# Patient Record
Sex: FEMALE | Race: BLACK OR AFRICAN AMERICAN | Employment: FULL TIME | URBAN - METROPOLITAN AREA
[De-identification: names, ages, dates, MRNs, and addresses within clinical notes are randomized per-mention and may not be internally consistent; named-entity substitution may affect disease eponyms.]

---

## 2019-01-09 ENCOUNTER — APPOINTMENT (OUTPATIENT)
Dept: ULTRASOUND IMAGING | Age: 34
End: 2019-01-09
Attending: EMERGENCY MEDICINE
Payer: SELF-PAY

## 2019-01-09 ENCOUNTER — HOSPITAL ENCOUNTER (EMERGENCY)
Age: 34
Discharge: HOME | End: 2019-01-09
Attending: EMERGENCY MEDICINE
Payer: SELF-PAY

## 2019-01-09 ENCOUNTER — APPOINTMENT (OUTPATIENT)
Dept: CT IMAGING | Age: 34
End: 2019-01-09
Attending: EMERGENCY MEDICINE
Payer: SELF-PAY

## 2019-01-09 VITALS
RESPIRATION RATE: 18 BRPM | BODY MASS INDEX: 47.09 KG/M2 | TEMPERATURE: 98 F | OXYGEN SATURATION: 100 % | DIASTOLIC BLOOD PRESSURE: 92 MMHG | HEIGHT: 66 IN | HEART RATE: 79 BPM | SYSTOLIC BLOOD PRESSURE: 132 MMHG | WEIGHT: 293 LBS

## 2019-01-09 DIAGNOSIS — D21.9 LEIOMYOMA: Primary | ICD-10-CM

## 2019-01-09 DIAGNOSIS — N13.4 HYDROURETER: ICD-10-CM

## 2019-01-09 DIAGNOSIS — R10.31 ABDOMINAL PAIN, RIGHT LOWER QUADRANT: ICD-10-CM

## 2019-01-09 LAB
ALBUMIN SERPL-MCNC: 3.5 G/DL (ref 3.5–5)
ALBUMIN/GLOB SERPL: 0.8 {RATIO} (ref 1.1–2.2)
ALP SERPL-CCNC: 87 U/L (ref 45–117)
ALT SERPL-CCNC: 15 U/L (ref 12–78)
ANION GAP SERPL CALC-SCNC: 9 MMOL/L (ref 5–15)
APPEARANCE UR: ABNORMAL
AST SERPL-CCNC: 21 U/L (ref 15–37)
BACTERIA URNS QL MICRO: NEGATIVE /HPF
BASOPHILS # BLD: 0 K/UL (ref 0–0.1)
BASOPHILS NFR BLD: 0 % (ref 0–1)
BILIRUB SERPL-MCNC: 0.2 MG/DL (ref 0.2–1)
BILIRUB UR QL: NEGATIVE
BUN SERPL-MCNC: 11 MG/DL (ref 6–20)
BUN/CREAT SERPL: 14 (ref 12–20)
CALCIUM SERPL-MCNC: 8.6 MG/DL (ref 8.5–10.1)
CHLORIDE SERPL-SCNC: 101 MMOL/L (ref 97–108)
CLUE CELLS VAG QL WET PREP: NORMAL
CO2 SERPL-SCNC: 25 MMOL/L (ref 21–32)
COLOR UR: ABNORMAL
CREAT SERPL-MCNC: 0.79 MG/DL (ref 0.55–1.02)
DIFFERENTIAL METHOD BLD: ABNORMAL
EOSINOPHIL # BLD: 0 K/UL (ref 0–0.4)
EOSINOPHIL NFR BLD: 0 % (ref 0–7)
EPITH CASTS URNS QL MICRO: ABNORMAL /LPF
ERYTHROCYTE [DISTWIDTH] IN BLOOD BY AUTOMATED COUNT: 18.7 % (ref 11.5–14.5)
GLOBULIN SER CALC-MCNC: 4.6 G/DL (ref 2–4)
GLUCOSE SERPL-MCNC: 87 MG/DL (ref 65–100)
GLUCOSE UR STRIP.AUTO-MCNC: NEGATIVE MG/DL
HCG UR QL: NEGATIVE
HCT VFR BLD AUTO: 29.9 % (ref 35–47)
HGB BLD-MCNC: 9.4 G/DL (ref 11.5–16)
HGB UR QL STRIP: ABNORMAL
IMM GRANULOCYTES # BLD AUTO: 0 K/UL (ref 0–0.04)
IMM GRANULOCYTES NFR BLD AUTO: 0 % (ref 0–0.5)
KETONES UR QL STRIP.AUTO: NEGATIVE MG/DL
KOH PREP SPEC: NORMAL
LEUKOCYTE ESTERASE UR QL STRIP.AUTO: ABNORMAL
LYMPHOCYTES # BLD: 2.5 K/UL (ref 0.8–3.5)
LYMPHOCYTES NFR BLD: 21 % (ref 12–49)
MCH RBC QN AUTO: 20.3 PG (ref 26–34)
MCHC RBC AUTO-ENTMCNC: 31.4 G/DL (ref 30–36.5)
MCV RBC AUTO: 64.4 FL (ref 80–99)
MONOCYTES # BLD: 0.9 K/UL (ref 0–1)
MONOCYTES NFR BLD: 8 % (ref 5–13)
NEUTS SEG # BLD: 8.4 K/UL (ref 1.8–8)
NEUTS SEG NFR BLD: 71 % (ref 32–75)
NITRITE UR QL STRIP.AUTO: NEGATIVE
PH UR STRIP: 6.5 [PH] (ref 5–8)
PLATELET # BLD AUTO: 466 K/UL (ref 150–400)
PMV BLD AUTO: 9.2 FL (ref 8.9–12.9)
POTASSIUM SERPL-SCNC: 3.8 MMOL/L (ref 3.5–5.1)
PROT SERPL-MCNC: 8.1 G/DL (ref 6.4–8.2)
PROT UR STRIP-MCNC: NEGATIVE MG/DL
RBC # BLD AUTO: 4.64 M/UL (ref 3.8–5.2)
RBC #/AREA URNS HPF: ABNORMAL /HPF (ref 0–5)
RBC MORPH BLD: ABNORMAL
SERVICE CMNT-IMP: NORMAL
SODIUM SERPL-SCNC: 135 MMOL/L (ref 136–145)
SP GR UR REFRACTOMETRY: 1.01 (ref 1–1.03)
T VAGINALIS VAG QL WET PREP: NORMAL
UR CULT HOLD, URHOLD: NORMAL
UROBILINOGEN UR QL STRIP.AUTO: 0.2 EU/DL (ref 0.2–1)
WBC # BLD AUTO: 11.8 K/UL (ref 3.6–11)
WBC MORPH BLD: ABNORMAL
WBC URNS QL MICRO: ABNORMAL /HPF (ref 0–4)
XXWBCSUS: 0
YEAST URNS QL MICRO: PRESENT

## 2019-01-09 PROCEDURE — 74011250637 HC RX REV CODE- 250/637: Performed by: EMERGENCY MEDICINE

## 2019-01-09 PROCEDURE — 74011250636 HC RX REV CODE- 250/636: Performed by: EMERGENCY MEDICINE

## 2019-01-09 PROCEDURE — 80053 COMPREHEN METABOLIC PANEL: CPT

## 2019-01-09 PROCEDURE — 81001 URINALYSIS AUTO W/SCOPE: CPT

## 2019-01-09 PROCEDURE — 76830 TRANSVAGINAL US NON-OB: CPT

## 2019-01-09 PROCEDURE — 36415 COLL VENOUS BLD VENIPUNCTURE: CPT

## 2019-01-09 PROCEDURE — 74177 CT ABD & PELVIS W/CONTRAST: CPT

## 2019-01-09 PROCEDURE — 87210 SMEAR WET MOUNT SALINE/INK: CPT

## 2019-01-09 PROCEDURE — 96374 THER/PROPH/DIAG INJ IV PUSH: CPT

## 2019-01-09 PROCEDURE — 76856 US EXAM PELVIC COMPLETE: CPT

## 2019-01-09 PROCEDURE — 81025 URINE PREGNANCY TEST: CPT

## 2019-01-09 PROCEDURE — 99284 EMERGENCY DEPT VISIT MOD MDM: CPT

## 2019-01-09 PROCEDURE — 87491 CHLMYD TRACH DNA AMP PROBE: CPT

## 2019-01-09 PROCEDURE — 85025 COMPLETE CBC W/AUTO DIFF WBC: CPT

## 2019-01-09 PROCEDURE — 74011636320 HC RX REV CODE- 636/320: Performed by: EMERGENCY MEDICINE

## 2019-01-09 RX ORDER — FLUCONAZOLE 100 MG/1
150 TABLET ORAL
Status: COMPLETED | OUTPATIENT
Start: 2019-01-09 | End: 2019-01-09

## 2019-01-09 RX ORDER — KETOROLAC TROMETHAMINE 30 MG/ML
30 INJECTION, SOLUTION INTRAMUSCULAR; INTRAVENOUS
Status: COMPLETED | OUTPATIENT
Start: 2019-01-09 | End: 2019-01-09

## 2019-01-09 RX ADMIN — IOPAMIDOL 100 ML: 755 INJECTION, SOLUTION INTRAVENOUS at 21:46

## 2019-01-09 RX ADMIN — KETOROLAC TROMETHAMINE 30 MG: 30 INJECTION, SOLUTION INTRAMUSCULAR at 19:46

## 2019-01-09 RX ADMIN — FLUCONAZOLE 150 MG: 100 TABLET ORAL at 23:07

## 2019-01-09 NOTE — ED PROVIDER NOTES
HPI  
 
Pt is a 26 y.o. F presenting with c/o right pelvic pain and right abdominal pain x 3 - 4 days. Sx worsened today bringing her to ED. No N/V. No exacerbating factors noted at this time. She has noticed vaginal spotting today as well as vaginal discharge. She states she is not sexually active and has nexplanon implant. No fever or chills. No changes in stool. She has medicated with motrin and aleve today without relief. No h/o ovarian cyst.  No other complaints at this time. Past Medical History:  
Diagnosis Date  Asthma   delivery History reviewed. No pertinent surgical history. History reviewed. No pertinent family history. Social History Socioeconomic History  Marital status: SINGLE Spouse name: Not on file  Number of children: Not on file  Years of education: Not on file  Highest education level: Not on file Social Needs  Financial resource strain: Not on file  Food insecurity - worry: Not on file  Food insecurity - inability: Not on file  Transportation needs - medical: Not on file  Transportation needs - non-medical: Not on file Occupational History  Not on file Tobacco Use  Smoking status: Current Every Day Smoker Packs/day: 0.25 Substance and Sexual Activity  Alcohol use: No  
 Drug use: No  
 Sexual activity: Not on file Other Topics Concern  Not on file Social History Narrative  Not on file ALLERGIES: Patient has no known allergies. Review of Systems Gastrointestinal: Positive for abdominal pain. Genitourinary: Positive for pelvic pain, vaginal bleeding and vaginal discharge. Vitals:  
 19 1825 BP: 189/84 Pulse: (!) 102 Resp: 18 Temp: 98 °F (36.7 °C) SpO2: 100% Weight: 137.3 kg (302 lb 11.1 oz) Height: 5' 6\" (1.676 m) Physical Exam  
Constitutional: She is oriented to person, place, and time. She appears well-developed. No distress. Eyes: Conjunctivae are normal.  
Cardiovascular: Regular rhythm. Tachycardia present. Pulmonary/Chest: Effort normal and breath sounds normal.  
Abdominal: Soft. Normal appearance and bowel sounds are normal. There is tenderness in the right lower quadrant and suprapubic area. There is no rigidity, no rebound, no guarding and no CVA tenderness. Hernia confirmed negative in the right inguinal area. Genitourinary: Rectum normal. Pelvic exam was performed with patient supine. Cervix exhibits no motion tenderness, no discharge and no friability. Right adnexum displays tenderness. There is bleeding in the vagina. No foreign body in the vagina. Lymphadenopathy: No inguinal adenopathy noted on the right or left side. Neurological: She is alert and oriented to person, place, and time. She has normal strength. No sensory deficit. GCS eye subscore is 4. GCS verbal subscore is 5. GCS motor subscore is 6. Skin: Skin is warm. Capillary refill takes less than 2 seconds. No rash noted. She is not diaphoretic. Psychiatric: She has a normal mood and affect. MDM Procedures 7:14 PM 
Change of shift. Care of patient signed over to Dr. Zachery Greene. Bedside handoff complete. The plan at this time is to follow up CMP, wet prep, and US. We have discussed CBC although diff pending. Pt advised if US normal she will need CT to r/o appendicitis. If cyst and she remains stable she may follow up outpatient GYN and if torsion she will need emergent GYN. Pt comfortable with the above plan. She is stable at this time.   
 
Leydi Strauss MD 
1/9/2019 
19:16

## 2019-01-09 NOTE — ED TRIAGE NOTES
Pt ambulatory to treatment area with c/o \"vaginal dark brown bleeding that has been going on for 23 days and I have also been having lower pelvic pain and some pain on the right side of my stomach. \"  Pt states \"i have been taking ibuprofen and aleve but it hasn't helped. \"  Pt denies vomiting and diarrhea, no urinary symptoms. Pt reports \"some nausea today. \"  Dr. Matthew Collins at bedside to evaluate.

## 2019-01-10 NOTE — ED NOTES
The patient was discharged home by Dr. Zoran Espinal  in stable condition. The patient is alert and oriented, in no respiratory distress and discharge vital signs obtained. The patient's diagnosis, condition and treatment were explained. The patient expressed understanding. No prescriptions given. No work/school note given. A discharge plan has been developed. A  was not involved in the process. Aftercare instructions were given. Pt ambulatory out of the ED.

## 2019-01-10 NOTE — ED NOTES
AIDET communication provided and informed of purposeful rounding to include collaboration of entire care team; patient acknowledged understanding. IV access obtained, blood sent to lab. Call bell in reach.

## 2019-01-10 NOTE — ED NOTES
7:06 PM 
Change of shift. Care of patient taken over from Dr. Andrey Bird; H&P reviewed, bedside handoff complete. Awaiting US, KOH, WET prep, metabolic panel. 8:56 PM 
Patient reassessed and states that pain is little improved. US reviewed and is significant for fibroid uterus and right ovarian cyst but patient still significantly tender in RLQ and has elevated WBC. Will obtain CT abd/pelvis to eval for appendicitis. 11:10 PM 
Patient reassessed and pain is improved. CT shows normal appendix but mild hydroureter possible due to mass effect of fibroid uterus. UA only with yeast.  Discussed findings with patient and advised follow-up with her GYN. Return to ED if new or worsened symptoms. Given single dose of diflucan in ED.

## 2019-01-11 LAB
C TRACH DNA SPEC QL NAA+PROBE: NEGATIVE
N GONORRHOEA DNA SPEC QL NAA+PROBE: NEGATIVE
SAMPLE TYPE: NORMAL
SERVICE CMNT-IMP: NORMAL
SPECIMEN SOURCE: NORMAL

## 2020-06-09 ENCOUNTER — APPOINTMENT (OUTPATIENT)
Dept: ULTRASOUND IMAGING | Age: 35
End: 2020-06-09
Attending: EMERGENCY MEDICINE
Payer: MEDICAID

## 2020-06-09 ENCOUNTER — HOSPITAL ENCOUNTER (OUTPATIENT)
Age: 35
Setting detail: OBSERVATION
Discharge: HOME | End: 2020-06-10
Attending: EMERGENCY MEDICINE | Admitting: FAMILY MEDICINE
Payer: MEDICAID

## 2020-06-09 ENCOUNTER — HOSPITAL ENCOUNTER (OUTPATIENT)
Dept: LAB | Age: 35
Discharge: HOME | End: 2020-06-09
Payer: MEDICAID

## 2020-06-09 ENCOUNTER — APPOINTMENT (OUTPATIENT)
Dept: CT IMAGING | Age: 35
End: 2020-06-09
Attending: EMERGENCY MEDICINE
Payer: MEDICAID

## 2020-06-09 DIAGNOSIS — D21.9 LEIOMYOMA: ICD-10-CM

## 2020-06-09 DIAGNOSIS — N12 PYELONEPHRITIS: Primary | ICD-10-CM

## 2020-06-09 DIAGNOSIS — D50.8 IRON DEFICIENCY ANEMIA SECONDARY TO INADEQUATE DIETARY IRON INTAKE: ICD-10-CM

## 2020-06-09 DIAGNOSIS — D64.9 ANEMIA, UNSPECIFIED TYPE: ICD-10-CM

## 2020-06-09 DIAGNOSIS — N93.9 VAGINAL BLEEDING: Primary | ICD-10-CM

## 2020-06-09 DIAGNOSIS — Z86.19 HISTORY OF HPV INFECTION: ICD-10-CM

## 2020-06-09 DIAGNOSIS — N10 PYELONEPHRITIS, ACUTE: ICD-10-CM

## 2020-06-09 DIAGNOSIS — E87.6 HYPOKALEMIA: ICD-10-CM

## 2020-06-09 PROBLEM — D72.829 LEUKOCYTOSIS: Status: ACTIVE | Noted: 2020-06-09

## 2020-06-09 PROBLEM — N85.8 UTERINE MASS: Status: ACTIVE | Noted: 2020-06-09

## 2020-06-09 LAB
ALBUMIN SERPL-MCNC: 3.1 G/DL (ref 3.5–5)
ALBUMIN/GLOB SERPL: 0.6 {RATIO} (ref 1.1–2.2)
ALP SERPL-CCNC: 74 U/L (ref 45–117)
ALT SERPL-CCNC: 21 U/L (ref 12–78)
ANION GAP SERPL CALC-SCNC: 9 MMOL/L (ref 5–15)
APPEARANCE UR: ABNORMAL
AST SERPL-CCNC: 16 U/L (ref 15–37)
BACTERIA URNS QL MICRO: ABNORMAL /HPF
BASOPHILS # BLD: 0 K/UL (ref 0–0.1)
BASOPHILS NFR BLD: 0 % (ref 0–1)
BILIRUB SERPL-MCNC: 0.5 MG/DL (ref 0.2–1)
BILIRUB UR QL: NEGATIVE
BUN SERPL-MCNC: 6 MG/DL (ref 6–20)
BUN/CREAT SERPL: 6 (ref 12–20)
CALCIUM SERPL-MCNC: 8.6 MG/DL (ref 8.5–10.1)
CHLORIDE SERPL-SCNC: 103 MMOL/L (ref 97–108)
CO2 SERPL-SCNC: 22 MMOL/L (ref 21–32)
COLOR UR: ABNORMAL
COMMENT, HOLDF: NORMAL
CREAT SERPL-MCNC: 0.99 MG/DL (ref 0.55–1.02)
DIFFERENTIAL METHOD BLD: ABNORMAL
EOSINOPHIL # BLD: 0 K/UL (ref 0–0.4)
EOSINOPHIL NFR BLD: 0 % (ref 0–7)
EPITH CASTS URNS QL MICRO: ABNORMAL /LPF
ERYTHROCYTE [DISTWIDTH] IN BLOOD BY AUTOMATED COUNT: 21.3 % (ref 11.5–14.5)
FERRITIN SERPL-MCNC: 22 NG/ML (ref 8–252)
FOLATE SERPL-MCNC: 7.3 NG/ML (ref 5–21)
GLOBULIN SER CALC-MCNC: 5 G/DL (ref 2–4)
GLUCOSE SERPL-MCNC: 106 MG/DL (ref 65–100)
GLUCOSE UR STRIP.AUTO-MCNC: NEGATIVE MG/DL
HCG SERPL QL: NEGATIVE
HCT VFR BLD AUTO: 25.3 % (ref 35–47)
HGB BLD-MCNC: 7.2 G/DL (ref 11.5–16)
HGB UR QL STRIP: ABNORMAL
HYALINE CASTS URNS QL MICRO: ABNORMAL /LPF (ref 0–5)
IMM GRANULOCYTES # BLD AUTO: 0.2 K/UL (ref 0–0.04)
IMM GRANULOCYTES NFR BLD AUTO: 1 % (ref 0–0.5)
IRON SATN MFR SERPL: 2 % (ref 20–50)
IRON SERPL-MCNC: 8 UG/DL (ref 35–150)
KETONES UR QL STRIP.AUTO: NEGATIVE MG/DL
LEUKOCYTE ESTERASE UR QL STRIP.AUTO: ABNORMAL
LYMPHOCYTES # BLD: 1.8 K/UL (ref 0.8–3.5)
LYMPHOCYTES NFR BLD: 9 % (ref 12–49)
MCH RBC QN AUTO: 16.1 PG (ref 26–34)
MCHC RBC AUTO-ENTMCNC: 28.5 G/DL (ref 30–36.5)
MCV RBC AUTO: 56.5 FL (ref 80–99)
MONOCYTES # BLD: 2.2 K/UL (ref 0–1)
MONOCYTES NFR BLD: 11 % (ref 5–13)
NEUTS SEG # BLD: 15.8 K/UL (ref 1.8–8)
NEUTS SEG NFR BLD: 79 % (ref 32–75)
NITRITE UR QL STRIP.AUTO: NEGATIVE
NRBC # BLD: 0 K/UL (ref 0–0.01)
NRBC BLD-RTO: 0 PER 100 WBC
PH UR STRIP: 6.5 [PH] (ref 5–8)
PLATELET # BLD AUTO: 475 K/UL (ref 150–400)
PMV BLD AUTO: 9.3 FL (ref 8.9–12.9)
POTASSIUM SERPL-SCNC: 2.9 MMOL/L (ref 3.5–5.1)
PROT SERPL-MCNC: 8.1 G/DL (ref 6.4–8.2)
PROT UR STRIP-MCNC: 30 MG/DL
RBC # BLD AUTO: 4.48 M/UL (ref 3.8–5.2)
RBC #/AREA URNS HPF: ABNORMAL /HPF (ref 0–5)
RBC MORPH BLD: ABNORMAL
SAMPLES BEING HELD,HOLD: NORMAL
SODIUM SERPL-SCNC: 134 MMOL/L (ref 136–145)
SP GR UR REFRACTOMETRY: 1.01 (ref 1–1.03)
TIBC SERPL-MCNC: 324 UG/DL (ref 250–450)
TSH SERPL DL<=0.05 MIU/L-ACNC: 0.33 UIU/ML (ref 0.36–3.74)
UROBILINOGEN UR QL STRIP.AUTO: 1 EU/DL (ref 0.2–1)
VIT B12 SERPL-MCNC: 282 PG/ML (ref 193–986)
WBC # BLD AUTO: 20 K/UL (ref 3.6–11)
WBC URNS QL MICRO: ABNORMAL /HPF (ref 0–4)

## 2020-06-09 PROCEDURE — 76856 US EXAM PELVIC COMPLETE: CPT

## 2020-06-09 PROCEDURE — 74177 CT ABD & PELVIS W/CONTRAST: CPT

## 2020-06-09 PROCEDURE — 84443 ASSAY THYROID STIM HORMONE: CPT

## 2020-06-09 PROCEDURE — 96374 THER/PROPH/DIAG INJ IV PUSH: CPT

## 2020-06-09 PROCEDURE — 84703 CHORIONIC GONADOTROPIN ASSAY: CPT

## 2020-06-09 PROCEDURE — 74011250637 HC RX REV CODE- 250/637: Performed by: FAMILY MEDICINE

## 2020-06-09 PROCEDURE — 80053 COMPREHEN METABOLIC PANEL: CPT

## 2020-06-09 PROCEDURE — 82746 ASSAY OF FOLIC ACID SERUM: CPT

## 2020-06-09 PROCEDURE — 82607 VITAMIN B-12: CPT

## 2020-06-09 PROCEDURE — 96375 TX/PRO/DX INJ NEW DRUG ADDON: CPT

## 2020-06-09 PROCEDURE — 74011000250 HC RX REV CODE- 250: Performed by: EMERGENCY MEDICINE

## 2020-06-09 PROCEDURE — 36415 COLL VENOUS BLD VENIPUNCTURE: CPT

## 2020-06-09 PROCEDURE — 74011000258 HC RX REV CODE- 258: Performed by: RADIOLOGY

## 2020-06-09 PROCEDURE — 74011250636 HC RX REV CODE- 250/636: Performed by: EMERGENCY MEDICINE

## 2020-06-09 PROCEDURE — 74011636320 HC RX REV CODE- 636/320: Performed by: RADIOLOGY

## 2020-06-09 PROCEDURE — 76830 TRANSVAGINAL US NON-OB: CPT

## 2020-06-09 PROCEDURE — 82728 ASSAY OF FERRITIN: CPT

## 2020-06-09 PROCEDURE — 88175 CYTOPATH C/V AUTO FLUID REDO: CPT

## 2020-06-09 PROCEDURE — 74011000258 HC RX REV CODE- 258: Performed by: EMERGENCY MEDICINE

## 2020-06-09 PROCEDURE — 87086 URINE CULTURE/COLONY COUNT: CPT

## 2020-06-09 PROCEDURE — 99218 HC RM OBSERVATION: CPT

## 2020-06-09 PROCEDURE — 83540 ASSAY OF IRON: CPT

## 2020-06-09 PROCEDURE — 81001 URINALYSIS AUTO W/SCOPE: CPT

## 2020-06-09 PROCEDURE — 74011250637 HC RX REV CODE- 250/637: Performed by: EMERGENCY MEDICINE

## 2020-06-09 PROCEDURE — 99284 EMERGENCY DEPT VISIT MOD MDM: CPT

## 2020-06-09 PROCEDURE — 85025 COMPLETE CBC W/AUTO DIFF WBC: CPT

## 2020-06-09 RX ORDER — POTASSIUM CHLORIDE 750 MG/1
30 TABLET, FILM COATED, EXTENDED RELEASE ORAL EVERY 4 HOURS
Status: DISCONTINUED | OUTPATIENT
Start: 2020-06-09 | End: 2020-06-09

## 2020-06-09 RX ORDER — POTASSIUM CHLORIDE 750 MG/1
40 TABLET, FILM COATED, EXTENDED RELEASE ORAL
Status: COMPLETED | OUTPATIENT
Start: 2020-06-09 | End: 2020-06-09

## 2020-06-09 RX ORDER — LANOLIN ALCOHOL/MO/W.PET/CERES
1 CREAM (GRAM) TOPICAL DAILY
Status: DISCONTINUED | OUTPATIENT
Start: 2020-06-10 | End: 2020-06-10 | Stop reason: HOSPADM

## 2020-06-09 RX ORDER — OXYCODONE AND ACETAMINOPHEN 5; 325 MG/1; MG/1
1 TABLET ORAL
Status: DISCONTINUED | OUTPATIENT
Start: 2020-06-09 | End: 2020-06-10 | Stop reason: HOSPADM

## 2020-06-09 RX ORDER — DIPHENHYDRAMINE HYDROCHLORIDE 50 MG/ML
12.5 INJECTION, SOLUTION INTRAMUSCULAR; INTRAVENOUS
Status: COMPLETED | OUTPATIENT
Start: 2020-06-09 | End: 2020-06-09

## 2020-06-09 RX ORDER — POTASSIUM CHLORIDE 750 MG/1
30 TABLET, FILM COATED, EXTENDED RELEASE ORAL EVERY 4 HOURS
Status: COMPLETED | OUTPATIENT
Start: 2020-06-09 | End: 2020-06-09

## 2020-06-09 RX ORDER — SODIUM CHLORIDE 0.9 % (FLUSH) 0.9 %
10 SYRINGE (ML) INJECTION
Status: COMPLETED | OUTPATIENT
Start: 2020-06-09 | End: 2020-06-09

## 2020-06-09 RX ORDER — SODIUM CHLORIDE 9 MG/ML
150 INJECTION, SOLUTION INTRAVENOUS ONCE
Status: COMPLETED | OUTPATIENT
Start: 2020-06-09 | End: 2020-06-09

## 2020-06-09 RX ORDER — MORPHINE SULFATE 2 MG/ML
1 INJECTION, SOLUTION INTRAMUSCULAR; INTRAVENOUS
Status: DISCONTINUED | OUTPATIENT
Start: 2020-06-09 | End: 2020-06-10 | Stop reason: HOSPADM

## 2020-06-09 RX ORDER — SODIUM CHLORIDE 0.9 % (FLUSH) 0.9 %
5-40 SYRINGE (ML) INJECTION EVERY 8 HOURS
Status: DISCONTINUED | OUTPATIENT
Start: 2020-06-09 | End: 2020-06-10 | Stop reason: HOSPADM

## 2020-06-09 RX ORDER — ACETAMINOPHEN 325 MG/1
650 TABLET ORAL
Status: DISCONTINUED | OUTPATIENT
Start: 2020-06-09 | End: 2020-06-10 | Stop reason: HOSPADM

## 2020-06-09 RX ORDER — LEVOFLOXACIN 750 MG/1
750 TABLET ORAL EVERY 24 HOURS
Status: DISCONTINUED | OUTPATIENT
Start: 2020-06-09 | End: 2020-06-10 | Stop reason: HOSPADM

## 2020-06-09 RX ORDER — ONDANSETRON 2 MG/ML
4 INJECTION INTRAMUSCULAR; INTRAVENOUS
Status: DISCONTINUED | OUTPATIENT
Start: 2020-06-09 | End: 2020-06-10 | Stop reason: HOSPADM

## 2020-06-09 RX ORDER — KETOROLAC TROMETHAMINE 30 MG/ML
15 INJECTION, SOLUTION INTRAMUSCULAR; INTRAVENOUS
Status: COMPLETED | OUTPATIENT
Start: 2020-06-09 | End: 2020-06-09

## 2020-06-09 RX ORDER — SODIUM CHLORIDE 0.9 % (FLUSH) 0.9 %
5-40 SYRINGE (ML) INJECTION AS NEEDED
Status: DISCONTINUED | OUTPATIENT
Start: 2020-06-09 | End: 2020-06-10 | Stop reason: HOSPADM

## 2020-06-09 RX ADMIN — SODIUM CHLORIDE 10 MG: 9 INJECTION INTRAMUSCULAR; INTRAVENOUS; SUBCUTANEOUS at 08:40

## 2020-06-09 RX ADMIN — Medication 10 ML: at 09:35

## 2020-06-09 RX ADMIN — SODIUM CHLORIDE 1000 ML: 900 INJECTION, SOLUTION INTRAVENOUS at 08:30

## 2020-06-09 RX ADMIN — POTASSIUM CHLORIDE 30 MEQ: 750 TABLET, FILM COATED, EXTENDED RELEASE ORAL at 17:10

## 2020-06-09 RX ADMIN — KETOROLAC TROMETHAMINE 15 MG: 30 INJECTION, SOLUTION INTRAMUSCULAR at 08:40

## 2020-06-09 RX ADMIN — LEVOFLOXACIN 750 MG: 750 TABLET, FILM COATED ORAL at 12:36

## 2020-06-09 RX ADMIN — METHYLPREDNISOLONE SODIUM SUCCINATE 125 MG: 125 INJECTION, POWDER, FOR SOLUTION INTRAMUSCULAR; INTRAVENOUS at 08:37

## 2020-06-09 RX ADMIN — IOPAMIDOL 100 ML: 755 INJECTION, SOLUTION INTRAVENOUS at 09:35

## 2020-06-09 RX ADMIN — DIPHENHYDRAMINE HYDROCHLORIDE 12.5 MG: 50 INJECTION, SOLUTION INTRAMUSCULAR; INTRAVENOUS at 08:38

## 2020-06-09 RX ADMIN — POTASSIUM CHLORIDE 30 MEQ: 750 TABLET, FILM COATED, EXTENDED RELEASE ORAL at 12:36

## 2020-06-09 RX ADMIN — CEFTRIAXONE SODIUM 1 G: 1 INJECTION, POWDER, FOR SOLUTION INTRAMUSCULAR; INTRAVENOUS at 09:09

## 2020-06-09 RX ADMIN — SODIUM CHLORIDE 150 ML/HR: 900 INJECTION, SOLUTION INTRAVENOUS at 10:07

## 2020-06-09 RX ADMIN — POTASSIUM CHLORIDE 40 MEQ: 750 TABLET, FILM COATED, EXTENDED RELEASE ORAL at 10:07

## 2020-06-09 RX ADMIN — SODIUM CHLORIDE 50 ML: 900 INJECTION, SOLUTION INTRAVENOUS at 09:35

## 2020-06-09 NOTE — H&P
[address] [facility]  Hospitalist Group  History and Physical    Primary Care Provider: None  Date of Service:  2020    Subjective:     Dylan Elizondo is a 38 y.o. female who presents with a number of medical issues: 1) anemia. 2) dysuria 3) abnormal vaginal bleeding 4) abdominal pain. [facility] and had a hemoglobin of 6.9 hematocrit of 23. She was referred to the emergency room but waited until this morning when she had childcare to come in. She had labs drawn in the ER which showed anemia and leukocytosis. CT of abd pelvis showing right sided hydronephrosis  With evidence of pyelonephritis from compression of right ureter from large uterus/uterine mass. Denies fever, cough, cold symptoms, neck pain, visual changes, focal weakness or rash. Denies  difficulty breathing, difficulty swallowing, SOB or chest pain. Denies any nausea, vomiting or diarrhea. Pt. Reports that she has not had any food or medications today prior to arrival. Patient has Nexplanon implant.        Review of Systems:    A comprehensive review of systems was negative except for that written in the History of Present Illness.      Past Medical History:   Diagnosis Date    Asthma      delivery    uterine fibroids, anemia requiring blood transfusion in the past     Surgical history- C Section x2    Meds- patient reports that she was not taking any prescription meds, and no OTC med prior to this admission    Allergies- mild itching with tylenol/codeine    SOCIAL HISTORY:  Patient resides at home with her  and 2 children, she works in a dental office  Patient ambulates independently   Smoking history: current cigarette smoker   Alcohol history: denies        Objective:     Patient Vitals for the past 24 hrs:   Temp Pulse Resp BP SpO2   20 0758 98.1 °F (36.7 °C) (!) 107 16 132/75 100 %       Physical Exam:   Visit Vitals  /75   Pulse (!) 107   Temp 98.1 °F (36.7 °C) Resp 16   SpO2 100%     General:  Alert, cooperative, no distress, appears stated age, overweight. Head:  Normocephalic, without obvious abnormality, atraumatic. Eyes:  Conjunctivae/corneas clear. EOMs intact. Throat: Lips, mucosa, and tongue normal. Teeth and gums normal.   Neck: No visible swelling or masses   Back:   Symmetric, no curvature. ROM normal.   Lungs:   Clear to auscultation bilaterally. Chest wall:  No tenderness or deformity. Heart:  Tachycardia, no murmurs   Abdomen:   Soft, mild distension, mild diffuse tenderness. Genitalia:  Deferred to GYN team.   Extremities: Extremities normal, atraumatic, no cyanosis , trace edema. Pulses: 2+ and symmetric all extremities. Skin: Skin color, texture, turgor normal. No rashes or lesions. Lymph nodes: Cervical, supraclavicular nodes normal.   Neurologic: CNII-XII intact. Normal strength, sensation        Data Review: All diagnostic labs and studies have been reviewed. Recent Results (from the past 24 hour(s))   CBC WITH AUTOMATED DIFF    Collection Time: 06/09/20  8:05 AM   Result Value Ref Range    WBC 20.0 (H) 3.6 - 11.0 K/uL    RBC 4.48 3.80 - 5.20 M/uL    HGB 7.2 (L) 11.5 - 16.0 g/dL    HCT 25.3 (L) 35.0 - 47.0 %    MCV 56.5 (L) 80.0 - 99.0 FL    MCH 16.1 (L) 26.0 - 34.0 PG    MCHC 28.5 (L) 30.0 - 36.5 g/dL    RDW 21.3 (H) 11.5 - 14.5 %    PLATELET 018 (H) 214 - 400 K/uL    MPV 9.3 8.9 - 12.9 FL    NRBC 0.0 0  WBC    ABSOLUTE NRBC 0.00 0.00 - 0.01 K/uL    NEUTROPHILS 79 (H) 32 - 75 %    LYMPHOCYTES 9 (L) 12 - 49 %    MONOCYTES 11 5 - 13 %    EOSINOPHILS 0 0 - 7 %    BASOPHILS 0 0 - 1 %    IMMATURE GRANULOCYTES 1 (H) 0.0 - 0.5 %    ABS. NEUTROPHILS 15.8 (H) 1.8 - 8.0 K/UL    ABS. LYMPHOCYTES 1.8 0.8 - 3.5 K/UL    ABS. MONOCYTES 2.2 (H) 0.0 - 1.0 K/UL    ABS. EOSINOPHILS 0.0 0.0 - 0.4 K/UL    ABS. BASOPHILS 0.0 0.0 - 0.1 K/UL    ABS. IMM.  GRANS. 0.2 (H) 0.00 - 0.04 K/UL    DF SMEAR SCANNED      RBC COMMENTS ANISOCYTOSIS  2+ RBC COMMENTS MICROCYTOSIS  3+        RBC COMMENTS HYPOCHROMIA  3+        RBC COMMENTS OVALOCYTES  PRESENT       METABOLIC PANEL, COMPREHENSIVE    Collection Time: 06/09/20  8:05 AM   Result Value Ref Range    Sodium 134 (L) 136 - 145 mmol/L    Potassium 2.9 (L) 3.5 - 5.1 mmol/L    Chloride 103 97 - 108 mmol/L    CO2 22 21 - 32 mmol/L    Anion gap 9 5 - 15 mmol/L    Glucose 106 (H) 65 - 100 mg/dL    BUN 6 6 - 20 MG/DL    Creatinine 0.99 0.55 - 1.02 MG/DL    BUN/Creatinine ratio 6 (L) 12 - 20      GFR est AA >60 >60 ml/min/1.73m2    GFR est non-AA >60 >60 ml/min/1.73m2    Calcium 8.6 8.5 - 10.1 MG/DL    Bilirubin, total 0.5 0.2 - 1.0 MG/DL    ALT (SGPT) 21 12 - 78 U/L    AST (SGOT) 16 15 - 37 U/L    Alk. phosphatase 74 45 - 117 U/L    Protein, total 8.1 6.4 - 8.2 g/dL    Albumin 3.1 (L) 3.5 - 5.0 g/dL    Globulin 5.0 (H) 2.0 - 4.0 g/dL    A-G Ratio 0.6 (L) 1.1 - 2.2     SAMPLES BEING HELD    Collection Time: 06/09/20  8:05 AM   Result Value Ref Range    SAMPLES BEING HELD 1BLU,1RED     COMMENT        Add-on orders for these samples will be processed based on acceptable specimen integrity and analyte stability, which may vary by analyte.    FERRITIN    Collection Time: 06/09/20  8:05 AM   Result Value Ref Range    Ferritin 22 8 - 252 NG/ML   HCG QL SERUM    Collection Time: 06/09/20  8:05 AM   Result Value Ref Range    HCG, Ql. Negative NEG     URINALYSIS W/ RFLX MICROSCOPIC    Collection Time: 06/09/20  8:13 AM   Result Value Ref Range    Color YELLOW/STRAW      Appearance CLOUDY (A) CLEAR      Specific gravity 1.010 1.003 - 1.030      pH (UA) 6.5 5.0 - 8.0      Protein 30 (A) NEG mg/dL    Glucose Negative NEG mg/dL    Ketone Negative NEG mg/dL    Bilirubin Negative NEG      Blood TRACE (A) NEG      Urobilinogen 1.0 0.2 - 1.0 EU/dL    Nitrites Negative NEG      Leukocyte Esterase LARGE (A) NEG      WBC  0 - 4 /hpf    RBC 0-5 0 - 5 /hpf    Epithelial cells MANY (A) FEW /lpf    Bacteria 3+ (A) NEG /hpf Hyaline cast 2-5 0 - 5 /lpf     CT Results  (Last 48 hours)               06/09/20 0947  CT ABD PELV W CONT Final result    Impression:  IMPRESSION:       1. Right kidney is smaller than the left and there are areas of scarring within   the right kidney. There there are also low-density areas in the right renal   cortex which may represent right pyelonephritis. . There is right   hydroureteronephrosis similar to the prior study with the right ureter being   compressed by enlarged leiomyomatous uterus. 2. The uterus measures 19.3 x 12.4 x 9.3 cm. There are mass lesions within the   uterus with the largest in the right uterine fundus measuring 10.7 x 8.6 cm   which is compressing the right ureter. Narrative:  EXAM: CT ABD PELV W CONT       INDICATION: concern for diverticulitis       COMPARISON: 1/9/2019        CONTRAST: 100 mL of Isovue-370. TECHNIQUE:    Following the uneventful intravenous administration of contrast, thin axial   images were obtained through the abdomen and pelvis. Coronal and sagittal   reconstructions were generated. Oral contrast was not administered. CT dose   reduction was achieved through use of a standardized protocol tailored for this   examination and automatic exposure control for dose modulation. FINDINGS:    LOWER THORAX: No significant abnormality in the incidentally imaged lower chest.   LIVER: No mass. BILIARY TREE: Gallbladder is within normal limits. CBD is not dilated. SPLEEN: within normal limits. PANCREAS: No mass or ductal dilatation. ADRENALS: Unremarkable. KIDNEYS: Right kidney is smaller than the right. There are areas of scarring. There are low-density areas in the cortex of the right kidney. There is right   hydroureteronephrosis which may be followed to the level of the enlarged uterus   similar to 1/29/2019. STOMACH: Unremarkable. SMALL BOWEL: No dilatation or wall thickening. COLON: No dilatation or wall thickening.    APPENDIX: Unremarkable. PERITONEUM: No ascites or pneumoperitoneum. RETROPERITONEUM: No lymphadenopathy or aortic aneurysm. REPRODUCTIVE ORGANS: The uterus is enlarged measuring 19.3 x 12.4 x 9.3 cm. There is a low-density mass again noted in the right uterine fundus measuring   10.7 x 8.6 cm which is compressing the right ureter. Small cysts are noted   within the ovaries similar to the prior study. URINARY BLADDER: No mass or calculus. BONES: No destructive bone lesion. ABDOMINAL WALL: There is a small umbilical hernia containing fat. ADDITIONAL COMMENTS: N/A                 Assessment:     Active Problems:    Leukocytosis (6/9/2020)      Pyelonephritis, acute (6/9/2020)      Anemia (6/9/2020)      Uterine mass (6/9/2020)        Plan:     1. UTI with Acute Right sided pyelonephritis/R hydroureter- due to uterine mass compressing R ureter  Starting levaquin, IVF bolus given in ER,   Patient treated for same issue in 1/2019  Follow urine culture  No evidence of complete obstruction and creatinine normal    2. Anemia- multifactorial but mostly due to abnormal uterine bleeding  CT showing huge uterine mass/fibroid compression R ureter  Pelvis US done and results noted  Check iron, b12, folate; monitor Hb, transfuse for Hb < 6    3) large uterine abdominopelvic mass- 20 x 13cm- causing R ureteral compression and pyelonephritis  Consult GYN oncology for eval and tx recs    4. Hypokalemia- replete PO  5. Morbid obesity  6. Tobacco use- nicotine patch  7. Tachycardia- likely due to #1 & 2, monitor  8.  Full Code  = motherMarguarite Height [phone]    Signed By: Tiago Escobar MD     June 9, 2020

## 2020-06-09 NOTE — ED PROVIDER NOTES
HPI patient is a 68-year-old morbidly obese female who presents the ED with reports of headache, body aches, leg heaviness; episodic rectal bleeding with bowel movements and irregular vaginal bleeding for several weeks. [facility] and had a hemoglobin of 6.9 hematocrit of 23. She was referred to the emergency room but waited until this morning when she had childcare to come in. She denies any vaginal bleeding or rectal bleeding at this time last episode was . Denies fever, cough, cold symptoms, neck pain, visual changes, focal weakness or rash. Denies  difficulty breathing, difficulty swallowing, SOB or chest pain. Denies any nausea, vomiting or diarrhea. Pt. Reports that she has not had any food or medications today prior to arrival.  Patient has Nexplanon implant. Past Medical History:   Diagnosis Date    Asthma      delivery        History reviewed. No pertinent surgical history. History reviewed. No pertinent family history.     Social History     Socioeconomic History    Marital status: SINGLE     Spouse name: Not on file    Number of children: Not on file    Years of education: Not on file    Highest education level: Not on file   Occupational History    Not on file   Social Needs    Financial resource strain: Not on file    Food insecurity     Worry: Not on file     Inability: Not on file    Transportation needs     Medical: Not on file     Non-medical: Not on file   Tobacco Use    Smoking status: Current Every Day Smoker     Packs/day: 0.25   Substance and Sexual Activity    Alcohol use: No    Drug use: No    Sexual activity: Not on file   Lifestyle    Physical activity     Days per week: Not on file     Minutes per session: Not on file    Stress: Not on file   Relationships    Social connections     Talks on phone: Not on file     Gets together: Not on file     Attends Latter day service: Not on file     Active member of club or organization: Not on file     Attends meetings of clubs or organizations: Not on file     Relationship status: Not on file    Intimate partner violence     Fear of current or ex partner: Not on file     Emotionally abused: Not on file     Physically abused: Not on file     Forced sexual activity: Not on file   Other Topics Concern    Not on file   Social History Narrative    Not on file         ALLERGIES: Patient has no known allergies. Review of Systems   Constitutional: Negative for activity change, appetite change, fever and unexpected weight change. HENT: Negative for congestion, sore throat and trouble swallowing. Respiratory: Negative for cough and shortness of breath. Gastrointestinal: Positive for blood in stool. Negative for abdominal pain, diarrhea, nausea and vomiting. Genitourinary: Positive for dysuria, flank pain and vaginal bleeding. Musculoskeletal: Negative for back pain, myalgias and neck pain. Skin: Negative for rash. Neurological: Negative for dizziness, light-headedness and headaches. All other systems reviewed and are negative. Vitals:    20 0758   BP: 132/75   Pulse: (!) 107   Resp: 16   Temp: 98.1 °F (36.7 °C)   SpO2: 100%            Physical Exam  Vitals signs and nursing note reviewed. Constitutional:       General: She is not in acute distress. Appearance: She is obese. She is not ill-appearing, toxic-appearing or diaphoretic. Comments: Morbidly obese black female, smoker; A0   HENT:      Head: Normocephalic. Right Ear: Tympanic membrane normal.      Left Ear: Tympanic membrane normal.      Nose: Nose normal.      Mouth/Throat:      Mouth: Mucous membranes are moist.      Pharynx: No posterior oropharyngeal erythema. Neck:      Musculoskeletal: Normal range of motion and neck supple. Cardiovascular:      Rate and Rhythm: Regular rhythm. Tachycardia present.    Pulmonary:      Effort: Pulmonary effort is normal.      Breath sounds: Normal breath sounds. Abdominal:      General: Bowel sounds are normal.      Tenderness: There is no abdominal tenderness. There is no guarding or rebound. Genitourinary:     Vagina: No vaginal discharge. Comments: No active vaginal bleeding  Skin:     General: Skin is warm and dry. Findings: No rash. Neurological:      General: No focal deficit present. Mental Status: She is alert and oriented to person, place, and time. Psychiatric:         Mood and Affect: Mood normal.         Behavior: Behavior normal.          MDM       Procedures      Patient has been reexamined and is feeling slightly better with IV fluids. Discussed results of ultrasound and CT with the patient. 10:43 AM  Patient's results and plan of care have been reviewed with her. Patient has verbally conveyed her understanding and agreement of her signs, symptoms, diagnosis, treatment and prognosis and additionally agrees to be admitted. CARLOS A Stephens Dr. examined the patient and discussed plan of care. Darielaist Una Serve for Admission  10:45 AM    ED Room Number: R37/R37  Patient Name and age:  Lance Pfeiffer 38 y.o.  female  Working Diagnosis:   1. Pyelonephritis    2. Anemia, unspecified type    3. Leiomyoma    4.  Hypokalemia        COVID-19 Suspicion:  no    Code Status:  Full Code  Readmission: no  Isolation Requirements:  no  Recommended Level of Care:  med/surg  Department:[facility] Adult ED - 21   Other:

## 2020-06-09 NOTE — CONSULTS
[address] [facility], [facility] Mathias Moritz [id number]  P [phone]  F [phone]       Mauro Reese       [id number]  1985    Admitted 2020 Date 2020     Admit dx: Uterine mass [N85.8]  Pyelonephritis, acute [N10]  Leukocytosis [D72.829]  Anemia [D64.9]      HPI:    Mauro Reese is a 38 y.o.  female being seen for fibroid uterus, right hydronephrosis and hx of vaginal bleeding, anemia. She presents to [facility] [place] from home d/t right sided abd/flank pain since last Tuesday, improved but worsened . Using APAP for relief. No hematuria/dysuria. +urgency. CT here demonstrates right hydro, pyelo and heterogenous, fibroid uterus. Admits F/C, no N/V, SOB. Also states +hematochezia Saturday. Bright red. Admit hx of hemorrhoids. This occurs ~3x/yr. Denies vaginal bleeding/discharge since end of menstrual cycle ~9 days ago. Periods last ~11 days, very heavy soaking through tampons/pads at times q2hr for 3 days. A0. Csection x 2 , . She states she was told of fibroids with her second Csection with Dr. Nataly Hay. She was seen at [facility] in 2019 forn right pelvic/abd pain. CT at that time showed similar picture with fibroid uterus and right hydro. She states the following month she was admitted to [facility] for kidney infection. She was told to f/u with her OBGYN, but did not see anyone until 2019 at [facility]. There she states US showed fibroids and was told \"it was not that serious. \"  She did not f/u. She has had an Implanon in situ for at least 5 yrs she thinks. She was last seen by GYN at [facility], states PAP not performed. Admits to HPV+ pap 5 yrs ago w/o followup. Lives with , 2 children. She admits she has completed child bearing. She is a dental assistant, only recently returned to work. She is uncertain if she has any insurance. +tob use 2-3cig/day. Hx of asthma w/o exacerbation.  She is sickle cell trait+, no hx VTE. Patient Active Problem List   Diagnosis Code    Sickle cell trait (HCC) D57.3    Obesity, morbid ([facility] [address] E66.01    Leiomyoma D21.9    Leukocytosis D72.829    Pyelonephritis, acute N10    Anemia D64.9    Uterine mass N85.8       Past Medical History:   Diagnosis Date    Asthma      delivery 2003    Sickle cell trait [facility])       History reviewed. No pertinent surgical history. Social History     Tobacco Use    Smoking status: Current Every Day Smoker     Packs/day: 0.25   Substance Use Topics    Alcohol use: No      History reviewed. No pertinent family history. Current Facility-Administered Medications   Medication Dose Route Frequency    sodium chloride (NS) flush 5-40 mL  5-40 mL IntraVENous Q8H    sodium chloride (NS) flush 5-40 mL  5-40 mL IntraVENous PRN    acetaminophen (TYLENOL) tablet 650 mg  650 mg Oral Q4H PRN    oxyCODONE-acetaminophen (PERCOCET) 5-325 mg per tablet 1 Tab  1 Tab Oral Q4H PRN    morphine injection 1 mg  1 mg IntraVENous Q3H PRN    ondansetron (ZOFRAN) injection 4 mg  4 mg IntraVENous Q4H PRN    levoFLOXacin (LEVAQUIN) tablet 750 mg  750 mg Oral Q24H    potassium chloride SR (KLOR-CON 10) tablet 30 mEq  30 mEq Oral Q4H     No Known Allergies     Review of Systems  A comprehensive review of systems was negative except for that written in the History of Present Illness. , 10 point ROS    OBJECTIVE:    Physical Exam  Visit Vitals  /76 (BP 1 Location: Left arm, BP Patient Position: At rest)   Pulse 74   Temp 98.6 °F (37 °C)   Resp 16   SpO2 100%     General appearance: alert, cooperative, no distress, appears stated age  Eyes: conjunctivae/corneas clear. PER, EOM's intact. Back: symmetric, no curvature. ROM normal. +mild tender right CVA. Lungs: clear to auscultation bilaterally  Heart: regular rate and rhythm, S1, S2 normal, no murmur, click, rub or gallop  Abdomen: soft, obese, ND.  Tender mild RUQ w/o rebound/guarding  Pelvic:  Vulva: Normal, no lesions   Vagina:  Smooth and no lesions, cream colored discharge no malodor. Cervix w/o lesion, no blood at os. Cytology taken. Cervix mobile, nontender. No induration/nodularity. Uterus dextrodeviated, mobile, ~24wks, no US nodularity. Adnexa nonpalp. LAURA no laurel blood. No active hemorrhoids. Extremities: extremities normal, atraumatic, no cyanosis or edema  Pulses: 2+ and symmetric  Skin: Skin color, texture, turgor normal. No rashes or lesions  Lymph nodes: Cervical, supraclavicular, and inguinal nodes normal.  Neurologic: Grossly normal    Data Review      Recent Results (from the past 24 hour(s))   CBC WITH AUTOMATED DIFF    Collection Time: 06/09/20  8:05 AM   Result Value Ref Range    WBC 20.0 (H) 3.6 - 11.0 K/uL    RBC 4.48 3.80 - 5.20 M/uL    HGB 7.2 (L) 11.5 - 16.0 g/dL    HCT 25.3 (L) 35.0 - 47.0 %    MCV 56.5 (L) 80.0 - 99.0 FL    MCH 16.1 (L) 26.0 - 34.0 PG    MCHC 28.5 (L) 30.0 - 36.5 g/dL    RDW 21.3 (H) 11.5 - 14.5 %    PLATELET 869 (H) 253 - 400 K/uL    MPV 9.3 8.9 - 12.9 FL    NRBC 0.0 0  WBC    ABSOLUTE NRBC 0.00 0.00 - 0.01 K/uL    NEUTROPHILS 79 (H) 32 - 75 %    LYMPHOCYTES 9 (L) 12 - 49 %    MONOCYTES 11 5 - 13 %    EOSINOPHILS 0 0 - 7 %    BASOPHILS 0 0 - 1 %    IMMATURE GRANULOCYTES 1 (H) 0.0 - 0.5 %    ABS. NEUTROPHILS 15.8 (H) 1.8 - 8.0 K/UL    ABS. LYMPHOCYTES 1.8 0.8 - 3.5 K/UL    ABS. MONOCYTES 2.2 (H) 0.0 - 1.0 K/UL    ABS. EOSINOPHILS 0.0 0.0 - 0.4 K/UL    ABS. BASOPHILS 0.0 0.0 - 0.1 K/UL    ABS. IMM.  GRANS. 0.2 (H) 0.00 - 0.04 K/UL    DF SMEAR SCANNED      RBC COMMENTS ANISOCYTOSIS  2+        RBC COMMENTS MICROCYTOSIS  3+        RBC COMMENTS HYPOCHROMIA  3+        RBC COMMENTS OVALOCYTES  PRESENT       METABOLIC PANEL, COMPREHENSIVE    Collection Time: 06/09/20  8:05 AM   Result Value Ref Range    Sodium 134 (L) 136 - 145 mmol/L    Potassium 2.9 (L) 3.5 - 5.1 mmol/L    Chloride 103 97 - 108 mmol/L    CO2 22 21 - 32 mmol/L Anion gap 9 5 - 15 mmol/L    Glucose 106 (H) 65 - 100 mg/dL    BUN 6 6 - 20 MG/DL    Creatinine 0.99 0.55 - 1.02 MG/DL    BUN/Creatinine ratio 6 (L) 12 - 20      GFR est AA >60 >60 ml/min/1.73m2    GFR est non-AA >60 >60 ml/min/1.73m2    Calcium 8.6 8.5 - 10.1 MG/DL    Bilirubin, total 0.5 0.2 - 1.0 MG/DL    ALT (SGPT) 21 12 - 78 U/L    AST (SGOT) 16 15 - 37 U/L    Alk. phosphatase 74 45 - 117 U/L    Protein, total 8.1 6.4 - 8.2 g/dL    Albumin 3.1 (L) 3.5 - 5.0 g/dL    Globulin 5.0 (H) 2.0 - 4.0 g/dL    A-G Ratio 0.6 (L) 1.1 - 2.2     SAMPLES BEING HELD    Collection Time: 06/09/20  8:05 AM   Result Value Ref Range    SAMPLES BEING HELD 1BLU,1RED     COMMENT        Add-on orders for these samples will be processed based on acceptable specimen integrity and analyte stability, which may vary by analyte.    FERRITIN    Collection Time: 06/09/20  8:05 AM   Result Value Ref Range    Ferritin 22 8 - 252 NG/ML   HCG QL SERUM    Collection Time: 06/09/20  8:05 AM   Result Value Ref Range    HCG, Ql. Negative NEG     FOLATE    Collection Time: 06/09/20  8:05 AM   Result Value Ref Range    Folate 7.3 5.0 - 21.0 ng/mL   IRON PROFILE    Collection Time: 06/09/20  8:05 AM   Result Value Ref Range    Iron 8 (L) 35 - 150 ug/dL    TIBC 324 250 - 450 ug/dL    Iron % saturation 2 (L) 20 - 50 %   VITAMIN B12    Collection Time: 06/09/20  8:05 AM   Result Value Ref Range    Vitamin B12 282 193 - 986 pg/mL   TSH 3RD GENERATION    Collection Time: 06/09/20  8:05 AM   Result Value Ref Range    TSH 0.33 (L) 0.36 - 3.74 uIU/mL   URINALYSIS W/ RFLX MICROSCOPIC    Collection Time: 06/09/20  8:13 AM   Result Value Ref Range    Color YELLOW/STRAW      Appearance CLOUDY (A) CLEAR      Specific gravity 1.010 1.003 - 1.030      pH (UA) 6.5 5.0 - 8.0      Protein 30 (A) NEG mg/dL    Glucose Negative NEG mg/dL    Ketone Negative NEG mg/dL    Bilirubin Negative NEG      Blood TRACE (A) NEG      Urobilinogen 1.0 0.2 - 1.0 EU/dL    Nitrites Negative NEG      Leukocyte Esterase LARGE (A) NEG      WBC  0 - 4 /hpf    RBC 0-5 0 - 5 /hpf    Epithelial cells MANY (A) FEW /lpf    Bacteria 3+ (A) NEG /hpf    Hyaline cast 2-5 0 - 5 /lpf       Imaging  CT Results (most recent):    Results from Hospital Encounter encounter on 06/09/20   CT ABD PELV W CONT    Narrative EXAM: CT ABD PELV W CONT    INDICATION: concern for diverticulitis    COMPARISON: 1/9/2019     CONTRAST: 100 mL of Isovue-370. TECHNIQUE:   Following the uneventful intravenous administration of contrast, thin axial  images were obtained through the abdomen and pelvis. Coronal and sagittal  reconstructions were generated. Oral contrast was not administered. CT dose  reduction was achieved through use of a standardized protocol tailored for this  examination and automatic exposure control for dose modulation. FINDINGS:   LOWER THORAX: No significant abnormality in the incidentally imaged lower chest.  LIVER: No mass. BILIARY TREE: Gallbladder is within normal limits. CBD is not dilated. SPLEEN: within normal limits. PANCREAS: No mass or ductal dilatation. ADRENALS: Unremarkable. KIDNEYS: Right kidney is smaller than the right. There are areas of scarring. There are low-density areas in the cortex of the right kidney. There is right  hydroureteronephrosis which may be followed to the level of the enlarged uterus  similar to 1/29/2019. STOMACH: Unremarkable. SMALL BOWEL: No dilatation or wall thickening. COLON: No dilatation or wall thickening. APPENDIX: Unremarkable. PERITONEUM: No ascites or pneumoperitoneum. RETROPERITONEUM: No lymphadenopathy or aortic aneurysm. REPRODUCTIVE ORGANS: The uterus is enlarged measuring 19.3 x 12.4 x 9.3 cm. There is a low-density mass again noted in the right uterine fundus measuring  10.7 x 8.6 cm which is compressing the right ureter. Small cysts are noted  within the ovaries similar to the prior study. URINARY BLADDER: No mass or calculus.   BONES: No destructive bone lesion. ABDOMINAL WALL: There is a small umbilical hernia containing fat. ADDITIONAL COMMENTS: N/A      Impression IMPRESSION:    1. Right kidney is smaller than the left and there are areas of scarring within  the right kidney. There there are also low-density areas in the right renal  cortex which may represent right pyelonephritis. . There is right  hydroureteronephrosis similar to the prior study with the right ureter being  compressed by enlarged leiomyomatous uterus. 2. The uterus measures 19.3 x 12.4 x 9.3 cm. There are mass lesions within the  uterus with the largest in the right uterine fundus measuring 10.7 x 8.6 cm  which is compressing the right ureter. US Results (most recent):  Results from [place] encounter on 06/09/20   US PELV NON OBS    Narrative EXAM:  ULTRASOUND FEMALE PELVIS TRANSABDOMINAL AND TRANSVAGINAL    TRANSABDOMINAL     INDICATION: Bleeding. COMPARISON: 2019. TECHNIQUE:  Real-time sonography of the pelvis was performed. . Multiple static images of the  uterus and ovaries were obtained. Study is limited by patient's body habitus. FINDINGS:  UTERUS:  The uterus is enlarged measuring 15.2 x 9.8 x 8 cm with suspicion of multiple  hypoechoic masses with the largest on the right measuring 10.6 x 9.6 x 8.7 cm. ENDOMETRIUM:  The endometrial stripe measures 1 cm. RIGHT OVARY:  The right ovary is obscured by bowel gas    LEFT OVARY:  The left ovary is obscured by bowel gas    CUL-DE-SAC:  There is no mass or fluid or other abnormality in the adnexa or cul-de-sac. Impression IMPRESSION:  Uterus is enlarged and there is suspicion of multiple masses within  the uterus with the largest measuring 10.6 x 9.6 x 8.7 cm most likely multiple  leiomyomata. Ovaries are obscured by bowel gas. Transvaginal sonography will be  performed to better evaluate. Please see separate report. TRANSVAGINAL    INDICATION: Bleeding.     COMPARISON: None.    TECHNIQUE: Transvaginal sonography was performed with multiple static images of  the uterus and ovariesobtained. Study is limited by patient's body habitus. FINDINGS:     UTERUS:  Uterus measures 17 x 11.1 x 12.7 cm. Again noted are multiple masses within the  uterus most consistent with leiomyomata. The endometrium measures 1 cm. RIGHT OVARY:  Obscured by bowel gas    LEFT OVARY:  Obscured by bowel gas    CUL-DE-SAC:  There is no fluid or mass or other abnormality in the pelvic cul-de-sac. IMPRESSION: Uterus is enlarged and again noted are multiple masses within the  uterus most consistent with multiple leiomyomata. Ovaries are obscured by bowel  gas. CT 1/9/19:  EXAM:  CT ABDOMEN PELVIS WITH CONTRAST  INDICATION:  Abdominal pain, RLQ; RLQ pain, elevated WBC. Additional history:  COMPARISON: None. .  TECHNIQUE:   Multislice helical CT was performed from the diaphragm to the symphysis pubis  with oral and intravenous contrast administration. Contiguous 5 mm axial images  were reconstructed and lung and soft tissue windows were generated. Coronal and  sagittal reformations were generated. CT dose reduction was achieved through use of a standardized protocol tailored  for this examination and automatic exposure control for dose modulation. Dejan Confer FINDINGS:  INCIDENTALLY IMAGED CHEST:  Heart/vessels: Within normal limits. Lungs/Pleura: There is a 0.3 cm nodule in the medial aspect of the right lower  lobe. .  ABDOMEN:  Liver: Within normal limits. Gallbladder/Biliary: Within normal limits. Spleen: Within normal limits. Pancreas: Within normal limits. Adrenals: Within normal limits. Kidneys: Small, low-attenuation areas or lesions in the right kidney which are  incompletely characterized. There are areas of focal cortical thinning in the  right kidney suggesting remote insults. Peritoneum/Mesenteries: Within normal limits. Extraperitoneum: Within normal limits.   Gastrointestinal tract: Within normal limits. Normal-appearing appendix  Vascular: Within normal limits. Christiano Ra PELVIS:  Extraperitoneum: Within normal limits. Ureters: Slight hydronephrosis of the proximal ureter ureter. Bladder: Within normal limits. Reproductive System: Enlarged, lobular and heterogeneous uterus which measures  approximately 19.5 x 12.2 x 13.9 cm. .  MSK:   Small, fat-containing umbilical hernia. Christiano Ra IMPRESSION  IMPRESSION:   1. Enlarged, heterogeneous and lobular appearing uterus. The imaging appearance  is nonspecific and may be due to multiple fibroids. 2. Mild, right-sided hydroureter which is likely related to mass effect from the  uterus. 3. Incidental findings as above. IMPRESSION/PLAN:    Patient Active Problem List   Diagnosis Code    Sickle cell trait (HCC) D57.3    Obesity, morbid ([facility] [address] E66.01    Leiomyoma D21.9    Leukocytosis D72.829    Pyelonephritis, acute N10    Anemia D64.9    Uterine mass N85.8       I reviewed with Dylan Elizondo her medical records, physical exam, and review of symptoms. 38 y.o. female with fibroid uterus, right hydro d/t fibroid uterus, pyelonephritis, leukocytosis. Discussed her findings and history. Following clearing of her infection, discussed surgery planning hysterectomy/salpingectomy, approach likely requiring open though might be able to look laparoscopically. Very low risk of malignancy as stable to smaller by CT since Jan 2019. Discussed benefits and she agrees. Given her hx, I expressed concern to her if she would follow up as an outpatient. She states she is convinced she needs to have something done. In the meantime repeat PAP taken for planning. Continue antibiotics. Anemia likely d/t menorrhagia. ?rectal bleeding. Follow hgb, shouldn't go much lower as not bleeding currently. Remains asx  F/u on rectal bleeding, consider hemoccult while here, GI followup. Tobacco cessation  We can continue to follow with you.  Thank you for the consultation. Signed By: Abdullahi Barron PA-C     6/9/2020/2:42 PM     Attending Attestation    I have seen and examined the above patient and agree with the care provider's assessment and plan. Plan for patient to follow-up with Gynecologic Oncology as an outpatient for consideration of a hysterectomy. No acute reasons for hysterectomy currently; and ideally would like the patient to make a full recovery from her current pyelonephritis. Re-iterated to the patient the importance of compliance of following up with our clinic. In regard to her risk of cancer, her uterus and fibroids appear stable since 1/2020 based on CT findings and likely represent normal uterine leiomyomas. However, I still stressed the importance of moving forward with surgery after her recovery as they are likely to grow larger given her pre-menopausal status, and that she is likely to continue to have recurrent episodes of pyelonephritis related to her mild hydronephrosis. Thank you for allowing us to participate in this pleasant patient's care. Gynecologic Oncology will continue to follow from [facility].      Jasbir Self MD

## 2020-06-09 NOTE — ED TRIAGE NOTES
Pt stated she was sent for a blood transfusion, hgb 6.9 from yesterday at [facility] , pt c/o headache and restless legs, pt had 2 day hx of bright red rectal bleeding but not now

## 2020-06-09 NOTE — PROGRESS NOTES
TRANSFER - IN REPORT:    Verbal report received from [address]  RN(name) on Francisca Stands  being received from ED(unit) for routine progression of care    Report consisted of patients Situation, Background, Assessment and   Recommendations(SBAR). Information from the following report(s) SBAR, Kardex, Intake/Output, MAR and Recent Results was reviewed with the receiving nurse. Opportunity for questions and clarification was provided. Assessment completed upon patients arrival to unit and care assumed. 1242: OBGYN ONC consult called  [phone]: PA Amanda Kanner at bedside    [id number]: pt to L&D room 12 to use bed with stirrups for pap exam with PA Amanda Kanner.  Primary RN accompanied pt and remained at bedside for duration of exam. Pt toelrated well.     1620: Dr Jenniffer Torres at bedside

## 2020-06-09 NOTE — ED NOTES
TRANSFER - OUT REPORT:    Verbal report given to Sheng Villagran RN (name) on Jocelyne Keith  being transferred to [facility] (unit) for routine progression of care       Report consisted of patients Situation, Background, Assessment and   Recommendations(SBAR). Information from the following report(s) SBAR, Kardex, ED Summary, Intake/Output, MAR and Recent Results was reviewed with the receiving nurse. Lines:   Peripheral IV 06/09/20 Right Antecubital (Active)   Site Assessment Clean, dry, & intact 6/9/2020  8:11 AM   Phlebitis Assessment 0 6/9/2020  8:11 AM   Infiltration Assessment 0 6/9/2020  8:11 AM   Dressing Status Clean, dry, & intact 6/9/2020  8:11 AM   Hub Color/Line Status Pink 6/9/2020  8:11 AM   Action Taken Blood drawn 6/9/2020  8:11 AM        Opportunity for questions and clarification was provided.

## 2020-06-10 VITALS
RESPIRATION RATE: 16 BRPM | TEMPERATURE: 97.8 F | OXYGEN SATURATION: 99 % | HEART RATE: 86 BPM | SYSTOLIC BLOOD PRESSURE: 129 MMHG | DIASTOLIC BLOOD PRESSURE: 76 MMHG

## 2020-06-10 PROBLEM — D50.9 IRON DEFICIENCY ANEMIA: Status: ACTIVE | Noted: 2020-06-10

## 2020-06-10 PROBLEM — E53.8 LOW FOLATE: Status: ACTIVE | Noted: 2020-06-10

## 2020-06-10 LAB
ANION GAP SERPL CALC-SCNC: 9 MMOL/L (ref 5–15)
BACTERIA SPEC CULT: NORMAL
BUN SERPL-MCNC: 11 MG/DL (ref 6–20)
BUN/CREAT SERPL: 15 (ref 12–20)
CALCIUM SERPL-MCNC: 8.7 MG/DL (ref 8.5–10.1)
CC UR VC: NORMAL
CHLORIDE SERPL-SCNC: 110 MMOL/L (ref 97–108)
CO2 SERPL-SCNC: 19 MMOL/L (ref 21–32)
CREAT SERPL-MCNC: 0.73 MG/DL (ref 0.55–1.02)
ERYTHROCYTE [DISTWIDTH] IN BLOOD BY AUTOMATED COUNT: 20.9 % (ref 11.5–14.5)
GLUCOSE SERPL-MCNC: 110 MG/DL (ref 65–100)
HCT VFR BLD AUTO: 23.1 % (ref 35–47)
HGB BLD-MCNC: 6.9 G/DL (ref 11.5–16)
MAGNESIUM SERPL-MCNC: 2 MG/DL (ref 1.6–2.4)
MCH RBC QN AUTO: 16.3 PG (ref 26–34)
MCHC RBC AUTO-ENTMCNC: 29.9 G/DL (ref 30–36.5)
MCV RBC AUTO: 54.5 FL (ref 80–99)
NRBC # BLD: 0.02 K/UL (ref 0–0.01)
NRBC BLD-RTO: 0.1 PER 100 WBC
PLATELET # BLD AUTO: 499 K/UL (ref 150–400)
POTASSIUM SERPL-SCNC: 3.8 MMOL/L (ref 3.5–5.1)
RBC # BLD AUTO: 4.24 M/UL (ref 3.8–5.2)
SERVICE CMNT-IMP: NORMAL
SODIUM SERPL-SCNC: 138 MMOL/L (ref 136–145)
WBC # BLD AUTO: 17.9 K/UL (ref 3.6–11)

## 2020-06-10 PROCEDURE — 36415 COLL VENOUS BLD VENIPUNCTURE: CPT

## 2020-06-10 PROCEDURE — 96374 THER/PROPH/DIAG INJ IV PUSH: CPT

## 2020-06-10 PROCEDURE — 85027 COMPLETE CBC AUTOMATED: CPT

## 2020-06-10 PROCEDURE — 83735 ASSAY OF MAGNESIUM: CPT

## 2020-06-10 PROCEDURE — 74011250637 HC RX REV CODE- 250/637: Performed by: PHYSICIAN ASSISTANT

## 2020-06-10 PROCEDURE — 99218 HC RM OBSERVATION: CPT

## 2020-06-10 PROCEDURE — 74011250636 HC RX REV CODE- 250/636: Performed by: FAMILY MEDICINE

## 2020-06-10 PROCEDURE — 80048 BASIC METABOLIC PNL TOTAL CA: CPT

## 2020-06-10 PROCEDURE — 74011250637 HC RX REV CODE- 250/637: Performed by: FAMILY MEDICINE

## 2020-06-10 RX ORDER — CIPROFLOXACIN 500 MG/1
500 TABLET ORAL 2 TIMES DAILY
Qty: 28 TAB | Refills: 0 | Status: SHIPPED | OUTPATIENT
Start: 2020-06-10 | End: 2020-07-01 | Stop reason: ALTCHOICE

## 2020-06-10 RX ORDER — FLUCONAZOLE 150 MG/1
150 TABLET ORAL ONCE
Qty: 3 TAB | Refills: 0 | Status: SHIPPED | OUTPATIENT
Start: 2020-06-10 | End: 2020-06-10

## 2020-06-10 RX ORDER — FOLIC ACID 1 MG/1
1 TABLET ORAL DAILY
Qty: 30 TAB | Refills: 2 | Status: SHIPPED | OUTPATIENT
Start: 2020-06-11 | End: 2020-12-11

## 2020-06-10 RX ORDER — POLYETHYLENE GLYCOL 3350 17 G/17G
17 POWDER, FOR SOLUTION ORAL DAILY
Status: DISCONTINUED | OUTPATIENT
Start: 2020-06-10 | End: 2020-06-10 | Stop reason: HOSPADM

## 2020-06-10 RX ORDER — OXYCODONE AND ACETAMINOPHEN 5; 325 MG/1; MG/1
1 TABLET ORAL
Qty: 20 TAB | Refills: 0 | Status: SHIPPED | OUTPATIENT
Start: 2020-06-10 | End: 2020-06-13

## 2020-06-10 RX ORDER — LANOLIN ALCOHOL/MO/W.PET/CERES
325 CREAM (GRAM) TOPICAL 2 TIMES DAILY WITH MEALS
Qty: 60 TAB | Refills: 3 | Status: SHIPPED | OUTPATIENT
Start: 2020-06-10 | End: 2020-12-11

## 2020-06-10 RX ORDER — FOLIC ACID 1 MG/1
1 TABLET ORAL DAILY
Qty: 30 TAB | Refills: 2 | Status: SHIPPED | OUTPATIENT
Start: 2020-06-11 | End: 2020-06-10

## 2020-06-10 RX ORDER — ONDANSETRON 4 MG/1
4 TABLET, ORALLY DISINTEGRATING ORAL
Qty: 30 TAB | Refills: 0 | Status: SHIPPED | OUTPATIENT
Start: 2020-06-10 | End: 2020-12-11

## 2020-06-10 RX ADMIN — Medication 1 MG: at 08:00

## 2020-06-10 RX ADMIN — Medication 10 ML: at 06:00

## 2020-06-10 RX ADMIN — IRON SUCROSE 100 MG: 20 INJECTION, SOLUTION INTRAVENOUS at 09:14

## 2020-06-10 RX ADMIN — LEVOFLOXACIN 750 MG: 750 TABLET, FILM COATED ORAL at 12:17

## 2020-06-10 RX ADMIN — POLYETHYLENE GLYCOL 3350 17 G: 17 POWDER, FOR SOLUTION ORAL at 09:20

## 2020-06-10 NOTE — PROGRESS NOTES
Bedside and Verbal shift change report given to [address] (oncoming nurse) by Estuardo Ash RN (offgoing nurse). Report included the following information SBAR, Kardex, Intake/Output, MAR and Recent Results. 0805: PA at bedside    1030: Md at bedside. Pt ok for discharge    1220: Discharge medications reviewed with patient and appropriate educational materials and side effects teaching were provided. I have reviewed discharge instructions with the patient. The patient verbalized understanding.     1245: pt left via wheelchair to discharge

## 2020-06-10 NOTE — DISCHARGE SUMMARY
Discharge Summary       PATIENT ID: Lovie Lanes  MRN: [id number]   YOB: 1985    DATE OF ADMISSION: 6/9/2020  8:00 AM    DATE OF DISCHARGE: 6/10/20 11:00am  PRIMARY CARE PROVIDER: None     ATTENDING PHYSICIAN: EMMA  DISCHARGING PROVIDER: John King MD    To contact this individual call [phone] and ask the  to page. If unavailable ask to be transferred the Adult Hospitalist Department. CONSULTATIONS: IP CONSULT TO HOSPITALIST  IP CONSULT TO GYNECOLOGICAL ONCOLOGY    PROCEDURES/SURGERIES: * No surgery found *    ADMITTING DIAGNOSES & HOSPITAL COURSE:   Lovie Lanes is a 38 y.o. female who presents with a number of medical issues: 1) anemia. 2) dysuria 3) abnormal vaginal bleeding 4) abdominal pain.  [facility] and had a hemoglobin of 6.9 hematocrit of 23.  She was referred to the emergency room but waited until this morning when she had childcare to come in. Jim Samano had labs drawn in the ER which showed anemia and leukocytosis. CT of abd pelvis showing right sided hydronephrosis  With evidence of pyelonephritis from compression of right ureter from large uterus/uterine mass. Denies fever, cough, cold symptoms, neck pain, visual changes, focal weakness or rash. Denies  difficulty breathing, difficulty swallowing, SOB or chest pain. Denies any nausea, vomiting or diarrhea. Pt. Reports that she has not had any food or medications today prior to arrival. Patient has Nexplanon implant.     [address] [facility] [address] / PLAN:      1. UTI with Acute Right sided pyelonephritis/R hydroureter- due to uterine mass compressing R ureter  Treated inpatient with IV ceftriaxone and IV levaquin, IVF bolus given in ER,   Patient treated for same issue in 1/2019  Follow urine culture, home today on 14days of cipro PO  No evidence of complete obstruction and creatinine normal     2.  Anemia- multifactorial but mostly due to abnormal uterine bleeding  CT showing huge uterine mass/fibroid compression R ureter  Pelvis US done and results noted  low iron, b12 normal , folate- low normal ; monitor Hb, transfuse for Hb < 6  Dose of IV iron given, no active bleeding- Hb 6.9 on DC- rx for PO iron and folate on DC     3) large uterine abdominopelvic mass- 20 x 13cm- causing R ureteral compression and pyelonephritis  Consulted GYN oncology for eval and tx recs  She will follow up with them for hysterectomy     4. Hypokalemia- repleted PO  5. Overweight- encourage diet and exercise  6. Tobacco use- cessation encouraged  7. Tachycardia- resolved  8. Mildly low TSH- follow up outpatient for further eval and tx     ADDITIONAL CARE RECOMMENDATIONS:     1) for anemia- have your regular MD monitor your hemoglobin levels outpatient  If your hemoglobin levels do not improve with iron and folate- see a gastroenterologist for eval for anemia related to GI blood loss  Your hemoglobin was 6.9 on discharge   2) for iron deficiency- IV iron given in the hospital and rx for iron on discharge- take as directed and have a primary care MD monitor your Iron levels  3) you have low folate levels- take folic acid supplements  4) your TSH- thyroid lab- was slightly low- have your regular MD recheck this with free T4 levels to rule out hyperthyroidism  5) for pyelonephritis (kidney infection) take antibiotics as directed for 2 weeks  6) for uterine mass- follow up with surgery for definitive treatments  Follow up with Dr. Yvette Figueredo to discuss surgery for your uterine fibroids. Please call for an appointment within two weeks of discharge.   [facility] GYNECOLOGIC ONCOLOGY  [address] [place] [address], [address], [address]  [phone]       PENDING TEST RESULTS:   At the time of discharge the following test results are still pending: final urine culture results    FOLLOW UP APPOINTMENTS:    Follow-up Information     Follow up With Specialties Details Why [address] [facility]  Schedule an appointment as soon as possible for a visit in 1 week re-evaluation [address] [place] [address]  [phone]    None    None (395) Patient stated that they have no PCP               DIET: regular    ACTIVITY: Activity as tolerated, no driving if on pain meds    WOUND CARE: NA    EQUIPMENT needed: NA      DISCHARGE MEDICATIONS:  Current Discharge Medication List      START taking these medications    Details   folic acid (FOLVITE) 1 mg tablet Take 1 Tab by mouth daily. Qty: 30 Tab, Refills: 2      ciprofloxacin HCl (Cipro) 500 mg tablet Take 1 Tab by mouth two (2) times a day. Start in am tomorrow  Qty: 28 Tab, Refills: 0      ferrous sulfate 325 mg (65 mg iron) tablet Take 1 Tab by mouth two (2) times daily (with meals). Qty: 60 Tab, Refills: 3    Associated Diagnoses: Iron deficiency anemia secondary to inadequate dietary iron intake      oxyCODONE-acetaminophen (PERCOCET) 5-325 mg per tablet Take 1 Tab by mouth every four (4) hours as needed for Pain for up to 3 days. Max Daily Amount: 6 Tabs. Qty: 20 Tab, Refills: 0    Associated Diagnoses: Pyelonephritis, acute      ondansetron (Zofran ODT) 4 mg disintegrating tablet Take 1 Tab by mouth every eight (8) hours as needed for Nausea or Vomiting. Qty: 30 Tab, Refills: 0               NOTIFY YOUR PHYSICIAN FOR ANY OF THE FOLLOWING:   Fever over 101 degrees for 24 hours. Chest pain, shortness of breath, fever, chills, nausea, vomiting, diarrhea, change in mentation, falling, weakness, bleeding. Severe pain or pain not relieved by medications. Or, any other signs or symptoms that you may have questions about.     DISPOSITION:    Home With:   OT  PT  HH  RN       Long term SNF/Inpatient Rehab   X Independent living    Hospice    Other:       PATIENT CONDITION AT DISCHARGE:     Functional status    Poor     Deconditioned    X Independent      Cognition   X  Lucid     Forgetful     Dementia      Catheters/lines (plus indication)    Triplett PICC     PEG    X None      Code status   X  Full code     DNR      PHYSICAL EXAMINATION AT DISCHARGE:  Patient Vitals for the past 24 hrs:   Temp Pulse Resp BP SpO2   06/10/20 0928    129/76    06/10/20 0809 97.8 °F (36.6 °C) 86 16 (!) 155/91 99 %   06/10/20 0400 97.7 °F (36.5 °C) 74 16 146/82 100 %   06/09/20 2050 97.7 °F (36.5 °C) 78 16 126/88 100 %   06/09/20 1512 98.1 °F (36.7 °C) 89 16 130/81 100 %   06/09/20 1226 98.6 °F (37 °C) 74 16 134/76 100 %   06/09/20 1159 98.2 °F (36.8 °C) 85 16 133/83 98 %     General:          Alert, cooperative, no distress, appears stated age. HEENT:           Atraumatic, anicteric sclerae, pink conjunctivae  Neck:               Supple, symmetrical  Lungs:             Clear to auscultation bilaterally. No Wheezing or Rhonchi. No rales. Chest wall:      No tenderness  No Accessory muscle use. Heart:              Regular  rhythm,  No  murmur   No edema  Abdomen:        Soft, non-tender. Not distended. Bowel sounds normal  Extremities:     No cyanosis. No clubbing,    Skin:                Not pale. Not Jaundiced  No rashes   Psych:             Not anxious or agitated.   Neurologic:      Alert, moves all extremities, answers questions appropriately and responds to commands   Recent Results (from the past 24 hour(s))   CBC W/O DIFF    Collection Time: 06/10/20  4:33 AM   Result Value Ref Range    WBC 17.9 (H) 3.6 - 11.0 K/uL    RBC 4.24 3.80 - 5.20 M/uL    HGB 6.9 (L) 11.5 - 16.0 g/dL    HCT 23.1 (L) 35.0 - 47.0 %    MCV 54.5 (L) 80.0 - 99.0 FL    MCH 16.3 (L) 26.0 - 34.0 PG    MCHC 29.9 (L) 30.0 - 36.5 g/dL    RDW 20.9 (H) 11.5 - 14.5 %    PLATELET 182 (H) 600 - 400 K/uL    NRBC 0.1 (H) 0  WBC    ABSOLUTE NRBC 0.02 (H) 0.00 - 2.59 K/uL   METABOLIC PANEL, BASIC    Collection Time: 06/10/20  4:33 AM   Result Value Ref Range    Sodium 138 136 - 145 mmol/L    Potassium 3.8 3.5 - 5.1 mmol/L    Chloride 110 (H) 97 - 108 mmol/L    CO2 19 (L) 21 - 32 mmol/L    Anion gap 9 5 - 15 mmol/L    Glucose 110 (H) 65 - 100 mg/dL    BUN 11 6 - 20 MG/DL    Creatinine 0.73 0.55 - 1.02 MG/DL    BUN/Creatinine ratio 15 12 - 20      GFR est AA >60 >60 ml/min/1.73m2    GFR est non-AA >60 >60 ml/min/1.73m2    Calcium 8.7 8.5 - 10.1 MG/DL   MAGNESIUM    Collection Time: 06/10/20  4:33 AM   Result Value Ref Range    Magnesium 2.0 1.6 - 2.4 mg/dL     CT Results  (Last 48 hours)               06/09/20 0947  CT ABD PELV W CONT Final result    Impression:  IMPRESSION:       1. Right kidney is smaller than the left and there are areas of scarring within   the right kidney. There there are also low-density areas in the right renal   cortex which may represent right pyelonephritis. . There is right   hydroureteronephrosis similar to the prior study with the right ureter being   compressed by enlarged leiomyomatous uterus. 2. The uterus measures 19.3 x 12.4 x 9.3 cm. There are mass lesions within the   uterus with the largest in the right uterine fundus measuring 10.7 x 8.6 cm   which is compressing the right ureter. Narrative:  EXAM: CT ABD PELV W CONT       INDICATION: concern for diverticulitis       COMPARISON: 1/9/2019        CONTRAST: 100 mL of Isovue-370. TECHNIQUE:    Following the uneventful intravenous administration of contrast, thin axial   images were obtained through the abdomen and pelvis. Coronal and sagittal   reconstructions were generated. Oral contrast was not administered. CT dose   reduction was achieved through use of a standardized protocol tailored for this   examination and automatic exposure control for dose modulation. FINDINGS:    LOWER THORAX: No significant abnormality in the incidentally imaged lower chest.   LIVER: No mass. BILIARY TREE: Gallbladder is within normal limits. CBD is not dilated. SPLEEN: within normal limits. PANCREAS: No mass or ductal dilatation. ADRENALS: Unremarkable. KIDNEYS: Right kidney is smaller than the right.  There are areas of scarring. There are low-density areas in the cortex of the right kidney. There is right   hydroureteronephrosis which may be followed to the level of the enlarged uterus   similar to 1/29/2019. STOMACH: Unremarkable. SMALL BOWEL: No dilatation or wall thickening. COLON: No dilatation or wall thickening. APPENDIX: Unremarkable. PERITONEUM: No ascites or pneumoperitoneum. RETROPERITONEUM: No lymphadenopathy or aortic aneurysm. REPRODUCTIVE ORGANS: The uterus is enlarged measuring 19.3 x 12.4 x 9.3 cm. There is a low-density mass again noted in the right uterine fundus measuring   10.7 x 8.6 cm which is compressing the right ureter. Small cysts are noted   within the ovaries similar to the prior study. URINARY BLADDER: No mass or calculus. BONES: No destructive bone lesion. ABDOMINAL WALL: There is a small umbilical hernia containing fat.    ADDITIONAL COMMENTS: N/A                 CHRONIC MEDICAL DIAGNOSES:  Problem List as of 6/10/2020 Date Reviewed: 6/10/2020          Codes Class Noted - Resolved    Iron deficiency anemia ICD-10-CM: D50.9  ICD-9-CM: 280.9  6/10/2020 - Present        Low folate ICD-10-CM: E53.8  ICD-9-CM: 266.2  6/10/2020 - Present        Leukocytosis ICD-10-CM: D72.829  ICD-9-CM: 288.60  6/9/2020 - Present        Pyelonephritis, acute ICD-10-CM: N10  ICD-9-CM: 590.10  6/9/2020 - Present        Anemia ICD-10-CM: D64.9  ICD-9-CM: 285.9  6/9/2020 - Present        Uterine mass ICD-10-CM: N85.8  ICD-9-CM: 625.8  6/9/2020 - Present        Sickle cell trait ([facility] [address] ICD-10-CM: D57.3  ICD-9-CM: 282.5  6/29/2010 - Present        Obesity, morbid ([facility] [address] ICD-10-CM: E66.01  ICD-9-CM: 278.01  6/29/2010 - Present        Leiomyoma ICD-10-CM: D21.9  ICD-9-CM: 215.9  6/29/2010 - Present              Greater than 30 minutes were spent with the patient on counseling and coordination of care    Signed:   Ector Byrne MD  6/10/2020  10:54 AM

## 2020-06-10 NOTE — PROGRESS NOTES
Problem: Pain  Goal: *Control of Pain  Outcome: Progressing Towards Goal  Goal: *PALLIATIVE CARE:  Alleviation of Pain  Outcome: Progressing Towards Goal     Problem: Patient Education: Go to Patient Education Activity  Goal: Patient/Family Education  Outcome: Progressing Towards Goal     Problem: Pressure Injury - Risk of  Goal: *Prevention of pressure injury  Description: Document Shaan Scale and appropriate interventions in the flowsheet. Outcome: Progressing Towards Goal  Note: Pressure Injury Interventions:                                            Problem: Patient Education: Go to Patient Education Activity  Goal: Patient/Family Education  Outcome: Progressing Towards Goal     Problem: Discharge Planning  Goal: *Discharge to safe environment  Outcome: Progressing Towards Goal  Goal: *Knowledge of medication management  Outcome: Progressing Towards Goal  Goal: *Knowledge of discharge instructions  Outcome: Progressing Towards Goal     Problem: Patient Education: Go to Patient Education Activity  Goal: Patient/Family Education  Outcome: Progressing Towards Goal     Problem: Falls - Risk of  Goal: *Absence of Falls  Description: Document Brigette Minium Fall Risk and appropriate interventions in the flowsheet.   Outcome: Progressing Towards Goal  Note: Fall Risk Interventions:                                Problem: Patient Education: Go to Patient Education Activity  Goal: Patient/Family Education  Outcome: Progressing Towards Goal

## 2020-06-10 NOTE — PROGRESS NOTES
Gynecologic Oncology - [address], [address] [facility]  P [phone]  F [phone]       Patient: Lilli Hernandez  Admit Date: 6/9/2020  Admit Dx: Uterine mass [N85.8]  Pyelonephritis, acute [N10]  Leukocytosis [D72.829]  Anemia [D64.9]    Subjective:     No c/o. Doing well this AM. No bleeding. Pain resolved. No F/C. Hesitancy/urgency resolved. Objective:         Physical Exam  /82 (BP 1 Location: Left arm, BP Patient Position: At rest;Head of bed elevated (Comment degrees))   Pulse 74   Temp 97.7 °F (36.5 °C)   Resp 16   SpO2 100%      General:  alert, cooperative, no distress     Cardiac:  Regular rate and rhythm        Lungs:  nonlabored  Abdomen:  soft, NTND       Wound:  na   Extremity: no edema    Wt Readings from Last 3 Encounters:   01/09/19 302 lb 11.1 oz (137.3 kg)   04/09/15 278 lb (126.1 kg)   12/12/13 260 lb (117.9 kg)         Data Review      Recent Labs     06/10/20  0433 06/09/20  0805   WBC 17.9* 20.0*   ANEU  --  15.8*   HGB 6.9* 7.2*   HCT 23.1* 25.3*   * 475*     Recent Labs     06/10/20  0433 06/09/20  0805    134*   K 3.8 2.9*   * 103   * 106*   BUN 11 6   CREA 0.73 0.99   CA 8.7 8.6   MG 2.0  --    ALB  --  3.1*   TBILI  --  0.5   ALT  --  21         Assessment/Plan:     Admitted for Uterine mass [N85.8]  Pyelonephritis, acute [N10]  Leukocytosis [D72.829]  Anemia [D64.9]    Active Hospital Problems    Diagnosis Date Noted    Leukocytosis 06/09/2020    Pyelonephritis, acute 06/09/2020    Anemia 06/09/2020    Uterine mass 06/09/2020         Onc: leiomyomata w/ menorrhagia, right ureteral compression. Low risk of malignancy. Recommended surgery. F/u as an outpatient. Our office contact is provided. Discussed compliance  Heme/CV: Hemodynamically stable with ongoing anemia. Fe replacement. F/u hemoccult and GI f/u. Hx constipation, added Miralax. 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 Continue abx. Indices stable baseline  ID/Wound: leukocytosis resolving. Disposition: No urgent need for surgery this admit. DC when able per hospitalist service. Surgery planning outpatient.       Long Mcguire PA-C

## 2020-06-10 NOTE — DISCHARGE INSTRUCTIONS
1) for anemia- have your regular MD monitor your hemoglobin levels outpatient  If your hemoglobin levels do not improve with iron and folate- see a gastroenterologist for eval for anemia related to GI blood loss  Your hemoglobin was 6.9 on discharge   2) for iron deficiency- IV iron given in the hospital and rx for iron on discharge- take as directed and have a primary care MD monitor your Iron levels  3) you have low folate levels- take folic acid supplements  4) your TSH- thyroid lab- was slightly low- have your regular MD recheck this with free T4 levels to rule out hyperthyroidism  5) for pyelonephritis (kidney infection) take antibiotics as directed for 2 weeks  6) for uterine mass- follow up with surgery for definitive treatments  Follow up with Dr. Spring Escobedo to discuss surgery for your uterine fibroids. Please call for an appointment within two weeks of discharge. [address] [facility], [facility] [address], [address]  [phone]         Anemia: Care Instructions  Your Care Instructions     Anemia is a low level of red blood cells, which carry oxygen throughout your body. Many things can cause anemia. Lack of iron is one of the most common causes. Your body needs iron to make hemoglobin, a substance in red blood cells that carries oxygen from the lungs to your body's cells. Without enough iron, the body produces fewer and smaller red blood cells. As a result, your body's cells do not get enough oxygen, and you feel tired and weak. And you may have trouble concentrating. Bleeding is the most common cause of a lack of iron. You may have heavy menstrual bleeding or bleeding caused by conditions such as ulcers, hemorrhoids, or cancer. Regular use of aspirin or other anti-inflammatory medicines (such as ibuprofen) also can cause bleeding in some people.  A lack of iron in your diet also can cause anemia, especially at times when the body needs more iron, such as during pregnancy, infancy, and the teen years. Your doctor may have prescribed iron pills. It may take several months of treatment for your iron levels to return to normal. Your doctor also may suggest that you eat foods that are rich in iron, such as meat and beans. There are many other causes of anemia. It is not always due to a lack of iron. Finding the specific cause of your anemia will help your doctor find the right treatment for you. Follow-up care is a key part of your treatment and safety. Be sure to make and go to all appointments, and call your doctor if you are having problems. It's also a good idea to know your test results and keep a list of the medicines you take. How can you care for yourself at home? · Take your medicines exactly as prescribed. Call your doctor if you think you are having a problem with your medicine. · If your doctor recommends iron pills, take them as directed:  ? Try to take the pills on an empty stomach about 1 hour before or 2 hours after meals. But you may need to take iron with food to avoid an upset stomach. ? Do not take antacids or drink milk or caffeine drinks (such as coffee, tea, or cola) at the same time or within 2 hours of the time that you take your iron. They can make it hard for your body to absorb the iron. ? Vitamin C (from food or supplements) helps your body absorb iron. Try taking iron pills with a glass of orange juice or some other food that is high in vitamin C, such as citrus fruits. ? Iron pills may cause stomach problems, such as heartburn, nausea, diarrhea, constipation, and cramps. Be sure to drink plenty of fluids, and include fruits, vegetables, and fiber in your diet each day. Iron pills often make your bowel movements dark or green. ? If you forget to take an iron pill, do not take a double dose of iron the next time you take a pill. ? Keep iron pills out of the reach of small children. An overdose of iron can be very dangerous.   · Follow your doctor's advice about eating iron-rich foods. These include red meat, shellfish, poultry, eggs, beans, raisins, whole-grain bread, and leafy green vegetables. · Steam vegetables to help them keep their iron content. When should you call for help? [id number] anytime you think you may need emergency care. For example, call if:  · You have symptoms of a heart attack. These may include:  ? Chest pain or pressure, or a strange feeling in the chest.  ? Sweating. ? Shortness of breath. ? Nausea or vomiting. ? Pain, pressure, or a strange feeling in the back, neck, jaw, or upper belly or in one or both shoulders or arms. ? Lightheadedness or sudden weakness. ? A fast or irregular heartbeat. After you call 911, the  may tell you to chew 1 adult-strength or 2 to 4 low-dose aspirin. Wait for an ambulance. Do not try to drive yourself. · You passed out (lost consciousness). Call your doctor now or seek immediate medical care if:  · You have new or increased shortness of breath. · You are dizzy or lightheaded, or you feel like you may faint. · Your fatigue and weakness continue or get worse. · You have any abnormal bleeding, such as:  ? Nosebleeds. ? Vaginal bleeding that is different (heavier, more frequent, at a different time of the month) than what you are used to.  ? Bloody or black stools, or rectal bleeding. ? Bloody or pink urine. Watch closely for changes in your health, and be sure to contact your doctor if:  · You do not get better as expected. Where can you learn more? Go to http://theodora-james.info/  Enter R301 in the search box to learn more about \"Anemia: Care Instructions. \"  Current as of: November 8, 2019               Content Version: 12.5  © 8310-0460 Healthwise, Incorporated. Care instructions adapted under license by [employer] (which disclaims liability or warranty for this information).  If you have questions about a medical condition or this instruction, always ask your healthcare professional. [place] [address] any warranty or liability for your use of this information. Follow up with Dr. Christopher Haney to discuss surgery for your uterine fibroids. Please call for an appointment within two weeks of discharge.   [address], [address], [address]  [phone]

## 2020-06-11 ENCOUNTER — PATIENT OUTREACH (OUTPATIENT)
Dept: INTERNAL MEDICINE CLINIC | Age: 35
End: 2020-06-11

## 2020-06-11 PROBLEM — N92.0 MENORRHAGIA WITH REGULAR CYCLE: Status: ACTIVE | Noted: 2020-06-11

## 2020-06-11 NOTE — PROGRESS NOTES
Patient contacted regarding recent discharge and COVID-19 risk. Discussed COVID-19 related testing which was not done at this time. Test results were not done. Patient informed of results, if available? no    Care Transition Nurse/ Ambulatory Care Manager contacted the patient by telephone to perform post discharge assessment. Verified name and  with patient as identifiers. Patient has following risk factors of: no known risk factors. CTN/ACM reviewed discharge instructions, medical action plan and red flags related to discharge diagnosis. Reviewed and educated them on any new and changed medications related to discharge diagnosis. Advised obtaining a 90-day supply of all daily and as-needed medications. Education provided regarding infection prevention, and signs and symptoms of COVID-19 and when to seek medical attention with patient who verbalized understanding. Discussed exposure protocols and quarantine from [address] you at higher risk for severe illness  and given an opportunity for questions and concerns. The patient agrees to contact the COVID-19 hotline [phone] or PCP office for questions related to their healthcare. CTN/ACM provided contact information for future reference. From CDC: Are you at higher risk for severe illness?  Wash your hands often.  Avoid close contact (6 feet, which is about two arm lengths) with people who are sick.  Put distance between yourself and other people if COVID-19 is spreading in your community.  Clean and disinfect frequently touched surfaces.  Avoid all cruise travel and non-essential air travel.  Call your healthcare professional if you have concerns about COVID-19 and your underlying condition or if you are sick.     For more information on steps you can take to protect yourself, see CDC's How to Protect Yourself      Patient/family/caregiver given information for Dany Flood and agrees to enroll yes  Patient's preferred e-mail:  [email or web address]  Patient's preferred phone number: [phone]  Based on Loop alert triggers, patient will be contacted by nurse care manager for worsening symptoms. Pt will be further monitored by COVID Loop Team based on severity of symptoms and risk factors.

## 2020-06-11 NOTE — PROGRESS NOTES
Please call patient, normal PAP, no HPV. Surgery planning followup with Dr. Jamarcsu Thornton.   Thanks  [employer]

## 2020-06-16 NOTE — PROGRESS NOTES
Pt was called and advised of pap smear results, Normal letter sent to address on file as well, pt was also made a new pt appt for 7/1/2020

## 2020-07-01 ENCOUNTER — OFFICE VISIT (OUTPATIENT)
Dept: GYNECOLOGY | Age: 35
End: 2020-07-01

## 2020-07-01 VITALS
SYSTOLIC BLOOD PRESSURE: 136 MMHG | BODY MASS INDEX: 45.19 KG/M2 | TEMPERATURE: 96.9 F | WEIGHT: 281.2 LBS | HEART RATE: 88 BPM | HEIGHT: 66 IN | DIASTOLIC BLOOD PRESSURE: 96 MMHG

## 2020-07-01 DIAGNOSIS — E66.01 OBESITY, MORBID (HCC): ICD-10-CM

## 2020-07-01 DIAGNOSIS — D57.3 SICKLE CELL TRAIT (HCC): Chronic | ICD-10-CM

## 2020-07-01 DIAGNOSIS — N92.0 MENORRHAGIA WITH REGULAR CYCLE: Primary | ICD-10-CM

## 2020-07-01 DIAGNOSIS — D21.9 LEIOMYOMA: ICD-10-CM

## 2020-07-01 DIAGNOSIS — N85.8 UTERINE MASS: ICD-10-CM

## 2020-07-01 NOTE — PROGRESS NOTES
New patient, hospital follow for uterine mass, pt reports constipation, she states she was straining with a bowel movement yesterday and saw a small amount of blood    Vitals:    07/01/20 1436 07/01/20 1444   BP: (!) 145/105 (!) 136/96   BP 1 Location: Left arm Left arm   BP Patient Position: Sitting Sitting   Pulse: 88 88   Temp: 96.9 °F (36.1 °C)    TempSrc: Temporal    Weight: 281 lb 3.2 oz (127.6 kg)    Height: 5' 6\" (1.676 m)          1. Have you been to the ER, urgent care clinic since your last visit? Hospitalized since your last visit? Hospital 6/9/20-6/10/20    2. Have you seen or consulted any other health care providers outside of the [address] [place] [address] since your last visit? Include any pap smears or colon screening.      no

## 2020-07-03 NOTE — PROGRESS NOTES
[address] [place] [address] Mathias Moritz [address], [address]  P [phone]  F [phone]    Office Note  Patient ID:  Name:  Araceli Blanco  MRN:  [id number]  :  1985/35 y.o. Date:  7/3/2020      HISTORY OF PRESENT ILLNESS:  Ms. Araceli Blanco is a 38 y.o.  premenopausal female who presents in follow-up from recent hospital discharge with large uterine fibroids and a pelvic mass. Initial Inpatient History:  Araceli Blanco is a 38 y.o.  female being seen for fibroid uterus, right hydronephrosis and hx of vaginal bleeding, anemia. She presents to [facility] from home d/t right sided abd/flank pain since last Tuesday, improved but worsened . Using APAP for relief. No hematuria/dysuria. +urgency. CT here demonstrates right hydro, pyelo and heterogenous, fibroid uterus. Admits F/C, no N/V, SOB. Also states +hematochezia Saturday. Bright red. Admit hx of hemorrhoids. This occurs ~3x/yr. Denies vaginal bleeding/discharge since end of menstrual cycle ~9 days ago. Periods last ~11 days, very heavy soaking through tampons/pads at times q2hr for 3 days. A0. Csection x 2 , . She states she was told of fibroids with her second Csection with Dr. Kevyn Ha. She was seen at [address] [place] [address] in 2019 forn right pelvic/abd pain. CT at that time showed similar picture with fibroid uterus and right hydro. She states the following month she was admitted to [facility] for kidney infection. She was told to f/u with her OBGYN, but did not see anyone until 2019 at [address] [place] [address]. There she states US showed fibroids and was told \"it was not that serious. \"  She did not f/u. She has had an Implanon in situ for at least 5 yrs she thinks. She was last seen by GYN at [address] [place] [address], states PAP not performed. Admits to HPV+ pap 5 yrs ago w/o followup. Lives with , 2 children. She admits she has completed child bearing.  She is a dental assistant, only recently returned to work. She is uncertain if she has any insurance. +tob use 2-3cig/day. Hx of asthma w/o exacerbation. She is sickle cell trait+, no hx VTE. Pertinent PMH/PSH: obesity, sickle cell trait, multiple UTIs c/b pyelonephritis     Active, no restrictions. Imaging Review:   CT A/P 6/9/2020  FINDINGS:   LOWER THORAX: No significant abnormality in the incidentally imaged lower chest.  LIVER: No mass. BILIARY TREE: Gallbladder is within normal limits. CBD is not dilated. SPLEEN: within normal limits. PANCREAS: No mass or ductal dilatation. ADRENALS: Unremarkable. KIDNEYS: Right kidney is smaller than the right. There are areas of scarring. There are low-density areas in the cortex of the right kidney. There is right  hydroureteronephrosis which may be followed to the level of the enlarged uterus  similar to 1/29/2019. STOMACH: Unremarkable. SMALL BOWEL: No dilatation or wall thickening. COLON: No dilatation or wall thickening. APPENDIX: Unremarkable. PERITONEUM: No ascites or pneumoperitoneum. RETROPERITONEUM: No lymphadenopathy or aortic aneurysm. REPRODUCTIVE ORGANS: The uterus is enlarged measuring 19.3 x 12.4 x 9.3 cm. There is a low-density mass again noted in the right uterine fundus measuring  10.7 x 8.6 cm which is compressing the right ureter. Small cysts are noted  within the ovaries similar to the prior study. URINARY BLADDER: No mass or calculus. BONES: No destructive bone lesion. ABDOMINAL WALL: There is a small umbilical hernia containing fat. ADDITIONAL COMMENTS: N/A  IMPRESSION  IMPRESSION:  1. Right kidney is smaller than the left and there are areas of scarring within  the right kidney. There there are also low-density areas in the right renal  cortex which may represent right pyelonephritis. . There is right  hydroureteronephrosis similar to the prior study with the right ureter being  compressed by enlarged leiomyomatous uterus.   2. The uterus measures 19.3 x 12.4 x 9.3 cm. There are mass lesions within the  uterus with the largest in the right uterine fundus measuring 10.7 x 8.6 cm  which is compressing the right ureter. ROS:  A comprehensive review of systems was negative except for that written in the History of Present Illness. , 10 point ROS    OB/GYN ROS:  Patient denies significant menstrual problems. ECOG ndGndrndanddndend:nd nd2nd Problem List:  Patient Active Problem List    Diagnosis Date Noted    Menorrhagia with regular cycle 2020    Iron deficiency anemia 06/10/2020    Low folate 06/10/2020    Leukocytosis 2020    Pyelonephritis, acute 2020    Anemia 2020    Uterine mass 2020    Sickle [facility] [address] 2010    Obesity, morbid ([facility] [address] 2010    Leiomyoma 2010     PMH:  Past Medical History:   Diagnosis Date    Asthma      delivery 2003    Sickle cell trait ([facility] [address]       PSH:  Past Surgical History:   Procedure Laterality Date    HX  SECTION      X2      Social History:  Social History     Tobacco Use    Smoking status: Current Every Day Smoker     Packs/day: 0.25    Smokeless tobacco: Never Used   Substance Use Topics    Alcohol use: No      Family History:  History reviewed. No pertinent family history. Medications: (reviewed)  Current Outpatient Medications   Medication Sig    ferrous sulfate 325 mg (65 mg iron) tablet Take 1 Tab by mouth two (2) times daily (with meals).  folic acid (FOLVITE) 1 mg tablet Take 1 Tab by mouth daily.  ondansetron (Zofran ODT) 4 mg disintegrating tablet Take 1 Tab by mouth every eight (8) hours as needed for Nausea or Vomiting. No current facility-administered medications for this visit.       Allergies: (reviewed)  No Known Allergies     Gyn History:   Last pap: normal 2020  History of abnormal pap: denies      OBJECTIVE:    Physical Exam:  VITAL SIGNS: Vitals:    20 1436 20 1444   BP: (!) 145/105 (!) 136/96   Pulse: 88 88   Temp: 96.9 °F (36.1 °C)    TempSrc: Temporal    Weight: 281 lb 3.2 oz (127.6 kg)    Height: 5' 6\" (1.676 m)      Body mass index is 45.39 kg/m². GENERAL ARMANI: Conversant, alert, oriented. No acute distress. HEENT: HEENT. No thyroid enlargement. No JVD. Neck: Supple without restrictions. RESPIRATORY: Clear to auscultation and percussion to the bases. No CVAT. CARDIOVASC: RRR without murmur/rub. GASTROINT: soft, non-tender, without masses or organomegaly   MUSCULOSKEL: no joint tenderness, deformity or swelling       EXTREMITIES: extremities normal, atraumatic, no cyanosis or edema   PELVIC: Exam chaperoned by nurse. Normal appearing external genitalia. On speculum exam, normal appearing vagina and cervix. On bimanual exam, the the uterus is 16-20 weeks size and adherent anteriorly to her prior  scar. It is globular and mobile to the lateral sides. . No evidence of adnexal masses or nodularity. RECTAL: deferred   RICHARD SURVEY: No suspicious lymphadenopathy or edema noted. NEURO: Grossly intact. No acute deficit.        Lab Date as available:    Lab Results   Component Value Date/Time    WBC 17.9 (H) 06/10/2020 04:33 AM    HGB 6.9 (L) 06/10/2020 04:33 AM    HCT 23.1 (L) 06/10/2020 04:33 AM    PLATELET 121 (H)  04:33 AM    MCV 54.5 (L) 06/10/2020 04:33 AM     Lab Results   Component Value Date/Time    Sodium 138 06/10/2020 04:33 AM    Potassium 3.8 06/10/2020 04:33 AM    Chloride 110 (H) 06/10/2020 04:33 AM    CO2 19 (L) 06/10/2020 04:33 AM    Anion gap 9 06/10/2020 04:33 AM    Glucose 110 (H) 06/10/2020 04:33 AM    BUN 11 06/10/2020 04:33 AM    Creatinine 0.73 06/10/2020 04:33 AM    BUN/Creatinine ratio 15 06/10/2020 04:33 AM    GFR est AA >60 06/10/2020 04:33 AM    GFR est non-AA >60 06/10/2020 04:33 AM    Calcium 8.7 06/10/2020 04:33 AM         IMPRESSION/PLAN:    Ms. Kings Pate is a 38 y.o. female with a working diagnosis of pelvic mass/large uterine fibroids, hydronephrosis secondary to the fibroids    Problems:     Patient Active Problem List    Diagnosis Date Noted    Menorrhagia with regular cycle 06/11/2020    Iron deficiency anemia 06/10/2020    Low folate 06/10/2020    Leukocytosis 06/09/2020    Pyelonephritis, acute 06/09/2020    Anemia 06/09/2020    Uterine mass 06/09/2020    Sickle cell trait ([facility] [address] 06/29/2010    Obesity, morbid ([facility] [address] 06/29/2010    Leiomyoma 06/29/2010       I reviewed Ms. Reema Pires's course to date, including her medical records, recent studies, physical exam, and review of symptoms. I have recommended surgical resection including a total hysterectomy with bilateral salpingectomy. Given her age, ovarian preservation is appropriate. I believe it would be reasonable to attempt a laparoscopic approach, although there is a likely 50% chance of requiring a conversion to an exploratory laparotomy and/or a mini-laparotomy for specimen removal. Will post in July/August at the patient's convenience as a diagnostic laparoscopy, TLH/BS, possible exploratory laparotomy. Will send for frozen pathology and proceed with staging if indicated. Reviewed risks, benefits, indications, and alternatives of surgery. Will collect CBCD, CMP, CXR, EKG, and HbA1c prior to surgery. All questions and concerns were addressed with the patient and she is comfortable with the plan.      Defined Sensitive Document    >50% of total time allocated to visit dedicated to counseling, 60 minutes total.    Signed By: Ward Menjivar MD     7/3/2020/8:06 AM

## 2020-07-03 NOTE — H&P (VIEW-ONLY)
[address] [facility], [address] [address] Nadine Serrano 
P [phone]  F [phone] Office Note Patient ID: 
Name:  Ole Wynne MRN:  [id number] :  1985/35 y.o. Date:  7/3/2020 HISTORY OF PRESENT ILLNESS: 
Ms. Ole Wynne is a 38 y.o.  premenopausal female who presents in follow-up from recent hospital discharge with large uterine fibroids and a pelvic mass. Initial Inpatient History: 
Ole Wynne is a 38 y.o.  female being seen for fibroid uterus, right hydronephrosis and hx of vaginal bleeding, anemia. She presents to [facility] from home d/t right sided abd/flank pain since last Tuesday, improved but worsened . Using APAP for relief. No hematuria/dysuria. +urgency. CT here demonstrates right hydro, pyelo and heterogenous, fibroid uterus. Admits F/C, no N/V, SOB. Also states +hematochezia Saturday. Bright red. Admit hx of hemorrhoids. This occurs ~3x/yr. Denies vaginal bleeding/discharge since end of menstrual cycle ~9 days ago. Periods last ~11 days, very heavy soaking through tampons/pads at times q2hr for 3 days. A0. Csection x 2 , . She states she was told of fibroids with her second Csection with Dr. Gerardo Chandler. She was seen at [facility] in 2019 forn right pelvic/abd pain. CT at that time showed similar picture with fibroid uterus and right hydro. She states the following month she was admitted to  for kidney infection. She was told to f/u with her OBGYN, but did not see anyone until 2019 at [facility]. There she states US showed fibroids and was told \"it was not that serious. \"  She did not f/u. She has had an Implanon in situ for at least 5 yrs she thinks. She was last seen by GYN at [facility], states PAP not performed. Admits to HPV+ pap 5 yrs ago w/o followup. Lives with , 2 children.  She admits she has completed child bearing. She is a dental assistant, only recently returned to work. She is uncertain if she has any insurance. +tob use 2-3cig/day. Hx of asthma w/o exacerbation. She is sickle cell trait+, no hx VTE. Pertinent PMH/PSH: obesity, sickle cell trait, multiple UTIs c/b pyelonephritis Active, no restrictions. Imaging Review:  
CT A/P 6/9/2020 FINDINGS:  
LOWER THORAX: No significant abnormality in the incidentally imaged lower chest. 
LIVER: No mass. BILIARY TREE: Gallbladder is within normal limits. CBD is not dilated. SPLEEN: within normal limits. PANCREAS: No mass or ductal dilatation. ADRENALS: Unremarkable. KIDNEYS: Right kidney is smaller than the right. There are areas of scarring. There are low-density areas in the cortex of the right kidney. There is right 
hydroureteronephrosis which may be followed to the level of the enlarged uterus 
similar to 1/29/2019. STOMACH: Unremarkable. SMALL BOWEL: No dilatation or wall thickening. COLON: No dilatation or wall thickening. APPENDIX: Unremarkable. PERITONEUM: No ascites or pneumoperitoneum. RETROPERITONEUM: No lymphadenopathy or aortic aneurysm. REPRODUCTIVE ORGANS: The uterus is enlarged measuring 19.3 x 12.4 x 9.3 cm. There is a low-density mass again noted in the right uterine fundus measuring 10.7 x 8.6 cm which is compressing the right ureter. Small cysts are noted 
within the ovaries similar to the prior study. URINARY BLADDER: No mass or calculus. BONES: No destructive bone lesion. ABDOMINAL WALL: There is a small umbilical hernia containing fat. ADDITIONAL COMMENTS: N/A IMPRESSION IMPRESSION: 
1. Right kidney is smaller than the left and there are areas of scarring within 
the right kidney. There there are also low-density areas in the right renal 
cortex which may represent right pyelonephritis. . There is right 
hydroureteronephrosis similar to the prior study with the right ureter being compressed by enlarged leiomyomatous uterus. 2. The uterus measures 19.3 x 12.4 x 9.3 cm. There are mass lesions within the 
uterus with the largest in the right uterine fundus measuring 10.7 x 8.6 cm 
which is compressing the right ureter. ROS: 
A comprehensive review of systems was negative except for that written in the History of Present Illness. , 10 point ROS 
 
OB/GYN ROS: 
Patient denies significant menstrual problems. ECOG ndGndrndanddndend:nd nd2nd Problem List: 
Patient Active Problem List  
 Diagnosis Date Noted  Menorrhagia with regular cycle 2020  Iron deficiency anemia 06/10/2020  Low folate 06/10/2020  Leukocytosis 2020  Pyelonephritis, acute 2020  Anemia 2020  Uterine mass 2020  Sickle cell trait ([facility] [address] 2010  Obesity, morbid ([facility] [address] 2010  Leiomyoma 2010 PMH: 
Past Medical History:  
Diagnosis Date  Asthma   delivery 2003  Sickle cell trait ([facility] [address] PSH: 
Past Surgical History:  
Procedure Laterality Date  HX  SECTION X2 Social History: 
Social History Tobacco Use  Smoking status: Current Every Day Smoker Packs/day: 0.25  Smokeless tobacco: Never Used Substance Use Topics  Alcohol use: No  
  
Family History: 
History reviewed. No pertinent family history. Medications: (reviewed) Current Outpatient Medications Medication Sig  
 ferrous sulfate 325 mg (65 mg iron) tablet Take 1 Tab by mouth two (2) times daily (with meals).  folic acid (FOLVITE) 1 mg tablet Take 1 Tab by mouth daily.  ondansetron (Zofran ODT) 4 mg disintegrating tablet Take 1 Tab by mouth every eight (8) hours as needed for Nausea or Vomiting. No current facility-administered medications for this visit. Allergies: (reviewed) No Known Allergies Gyn History:  
Last pap: normal 2020 History of abnormal pap: denies OBJECTIVE: 
 
Physical Exam: VITAL SIGNS: Vitals:  
 20 1436 20 1444 BP: (!) 145/105 (!) 136/96 Pulse: 88 88 Temp: 96.9 °F (36.1 °C) TempSrc: Temporal   
Weight: 281 lb 3.2 oz (127.6 kg) Height: 5' 6\" (1.676 m) Body mass index is 45.39 kg/m². GENERAL ARMANI: Conversant, alert, oriented. No acute distress. HEENT: HEENT. No thyroid enlargement. No JVD. Neck: Supple without restrictions. RESPIRATORY: Clear to auscultation and percussion to the bases. No CVAT. CARDIOVASC: RRR without murmur/rub. GASTROINT: soft, non-tender, without masses or organomegaly MUSCULOSKEL: no joint tenderness, deformity or swelling EXTREMITIES: extremities normal, atraumatic, no cyanosis or edema PELVIC: Exam chaperoned by nurse. Normal appearing external genitalia. On speculum exam, normal appearing vagina and cervix. On bimanual exam, the the uterus is 16-20 weeks size and adherent anteriorly to her prior  scar. It is globular and mobile to the lateral sides. . No evidence of adnexal masses or nodularity. RECTAL: deferred RICHARD SURVEY: No suspicious lymphadenopathy or edema noted. NEURO: Grossly intact. No acute deficit. Lab Date as available: 
 
Lab Results Component Value Date/Time WBC 17.9 (H) 06/10/2020 04:33 AM  
 HGB 6.9 (L) 06/10/2020 04:33 AM  
 HCT 23.1 (L) 06/10/2020 04:33 AM  
 PLATELET 232 (H) 5014 04:33 AM  
 MCV 54.5 (L) 06/10/2020 04:33 AM  
 
Lab Results Component Value Date/Time  Sodium 138 06/10/2020 04:33 AM  
 Potassium 3.8 06/10/2020 04:33 AM  
 Chloride 110 (H) 06/10/2020 04:33 AM  
 CO2 19 (L) 06/10/2020 04:33 AM  
 Anion gap 9 06/10/2020 04:33 AM  
 Glucose 110 (H) 06/10/2020 04:33 AM  
 BUN 11 06/10/2020 04:33 AM  
 Creatinine 0.73 06/10/2020 04:33 AM  
 BUN/Creatinine ratio 15 06/10/2020 04:33 AM  
 GFR est AA >60 06/10/2020 04:33 AM  
 GFR est non-AA >60 06/10/2020 04:33 AM  
 Calcium 8.7 06/10/2020 04:33 AM  
 
 
 
IMPRESSION/PLAN: 
 
 Ms. Hanh Moy is a 38 y.o. female with a working diagnosis of pelvic mass/large uterine fibroids, hydronephrosis secondary to the fibroids Problems: 
  
Patient Active Problem List  
 Diagnosis Date Noted  Menorrhagia with regular cycle 06/11/2020  Iron deficiency anemia 06/10/2020  Low folate 06/10/2020  Leukocytosis 06/09/2020  Pyelonephritis, acute 06/09/2020  Anemia 06/09/2020  Uterine mass 06/09/2020  Sickle cell trait ([facility] [address] 06/29/2010  Obesity, morbid ([facility] [address] 06/29/2010  Leiomyoma 06/29/2010 I reviewed Ms. José Pires's course to date, including her medical records, recent studies, physical exam, and review of symptoms. I have recommended surgical resection including a total hysterectomy with bilateral salpingectomy. Given her age, ovarian preservation is appropriate. I believe it would be reasonable to attempt a laparoscopic approach, although there is a likely 50% chance of requiring a conversion to an exploratory laparotomy and/or a mini-laparotomy for specimen removal. Will post in July/August at the patient's convenience as a diagnostic laparoscopy, TLH/BS, possible exploratory laparotomy. Will send for frozen pathology and proceed with staging if indicated. Reviewed risks, benefits, indications, and alternatives of surgery. Will collect CBCD, CMP, CXR, EKG, and HbA1c prior to surgery. All questions and concerns were addressed with the patient and she is comfortable with the plan.   
 
Defined Sensitive Document 
 
>50% of total time allocated to visit dedicated to counseling, 60 minutes total. 
 
Signed By: Oscar Fountain MD   
 7/3/2020/8:06 AM

## 2020-07-13 ENCOUNTER — HOSPITAL ENCOUNTER (OUTPATIENT)
Dept: PREADMISSION TESTING | Age: 35
Discharge: HOME | End: 2020-07-13
Payer: MEDICAID

## 2020-07-13 VITALS
TEMPERATURE: 98.1 F | HEART RATE: 93 BPM | DIASTOLIC BLOOD PRESSURE: 80 MMHG | BODY MASS INDEX: 45.39 KG/M2 | RESPIRATION RATE: 18 BRPM | HEIGHT: 66 IN | WEIGHT: 282.41 LBS | SYSTOLIC BLOOD PRESSURE: 115 MMHG

## 2020-07-13 LAB
ALBUMIN SERPL-MCNC: 3.5 G/DL (ref 3.5–5)
ALBUMIN/GLOB SERPL: 1 {RATIO} (ref 1.1–2.2)
ALP SERPL-CCNC: 81 U/L (ref 45–117)
ALT SERPL-CCNC: 16 U/L (ref 12–78)
ANION GAP SERPL CALC-SCNC: 7 MMOL/L (ref 5–15)
AST SERPL-CCNC: 11 U/L (ref 15–37)
ATRIAL RATE: 73 BPM
BASOPHILS # BLD: 0.1 K/UL (ref 0–0.1)
BASOPHILS NFR BLD: 1 % (ref 0–1)
BILIRUB SERPL-MCNC: 0.3 MG/DL (ref 0.2–1)
BUN SERPL-MCNC: 9 MG/DL (ref 6–20)
BUN/CREAT SERPL: 11 (ref 12–20)
CALCIUM SERPL-MCNC: 8.1 MG/DL (ref 8.5–10.1)
CALCULATED P AXIS, ECG09: 58 DEGREES
CALCULATED R AXIS, ECG10: 48 DEGREES
CALCULATED T AXIS, ECG11: 19 DEGREES
CHLORIDE SERPL-SCNC: 108 MMOL/L (ref 97–108)
CO2 SERPL-SCNC: 25 MMOL/L (ref 21–32)
CREAT SERPL-MCNC: 0.81 MG/DL (ref 0.55–1.02)
DIAGNOSIS, 93000: NORMAL
DIFFERENTIAL METHOD BLD: ABNORMAL
EOSINOPHIL # BLD: 0.1 K/UL (ref 0–0.4)
EOSINOPHIL NFR BLD: 2 % (ref 0–7)
ERYTHROCYTE [DISTWIDTH] IN BLOOD BY AUTOMATED COUNT: 23.8 % (ref 11.5–14.5)
GLOBULIN SER CALC-MCNC: 3.6 G/DL (ref 2–4)
GLUCOSE SERPL-MCNC: 84 MG/DL (ref 65–100)
HCT VFR BLD AUTO: 26 % (ref 35–47)
HGB BLD-MCNC: 7.4 G/DL (ref 11.5–16)
IMM GRANULOCYTES # BLD AUTO: 0 K/UL (ref 0–0.04)
IMM GRANULOCYTES NFR BLD AUTO: 0 % (ref 0–0.5)
LYMPHOCYTES # BLD: 1.7 K/UL (ref 0.8–3.5)
LYMPHOCYTES NFR BLD: 23 % (ref 12–49)
MCH RBC QN AUTO: 16.8 PG (ref 26–34)
MCHC RBC AUTO-ENTMCNC: 28.5 G/DL (ref 30–36.5)
MCV RBC AUTO: 59.1 FL (ref 80–99)
MONOCYTES # BLD: 0.8 K/UL (ref 0–1)
MONOCYTES NFR BLD: 11 % (ref 5–13)
NEUTS SEG # BLD: 4.6 K/UL (ref 1.8–8)
NEUTS SEG NFR BLD: 63 % (ref 32–75)
NRBC # BLD: 0 K/UL (ref 0–0.01)
NRBC BLD-RTO: 0 PER 100 WBC
P-R INTERVAL, ECG05: 188 MS
PLATELET # BLD AUTO: 512 K/UL (ref 150–400)
PMV BLD AUTO: 9.3 FL (ref 8.9–12.9)
POTASSIUM SERPL-SCNC: 4 MMOL/L (ref 3.5–5.1)
PROT SERPL-MCNC: 7.1 G/DL (ref 6.4–8.2)
Q-T INTERVAL, ECG07: 372 MS
QRS DURATION, ECG06: 76 MS
QTC CALCULATION (BEZET), ECG08: 409 MS
RBC # BLD AUTO: 4.4 M/UL (ref 3.8–5.2)
RBC MORPH BLD: ABNORMAL
SODIUM SERPL-SCNC: 140 MMOL/L (ref 136–145)
VENTRICULAR RATE, ECG03: 73 BPM
WBC # BLD AUTO: 7.3 K/UL (ref 3.6–11)

## 2020-07-13 PROCEDURE — 86900 BLOOD TYPING SEROLOGIC ABO: CPT

## 2020-07-13 PROCEDURE — 86923 COMPATIBILITY TEST ELECTRIC: CPT

## 2020-07-13 PROCEDURE — 80053 COMPREHEN METABOLIC PANEL: CPT

## 2020-07-13 PROCEDURE — 83036 HEMOGLOBIN GLYCOSYLATED A1C: CPT

## 2020-07-13 PROCEDURE — 85025 COMPLETE CBC W/AUTO DIFF WBC: CPT

## 2020-07-13 PROCEDURE — 93005 ELECTROCARDIOGRAM TRACING: CPT

## 2020-07-13 NOTE — PERIOP NOTES
Preoperative and medications instructions reviewed with patient. Patient given  2 bottles of CHG soap. Instructions reviewed on use of CHG soap. Patient given SSI infection FAQS sheet, Patient was given the opportunity to ask questions on the information provided. Information given to have chest xray completed , patient states will have x-ray done on 07/14/2020 @ [facility] Information given regarding covid testing and arrival to hospital on day of surgery.     Has additional questions regarding scheduled surgery, want to sign consent after talking with Dr. Errol Manning

## 2020-07-14 ENCOUNTER — TELEPHONE (OUTPATIENT)
Dept: GYNECOLOGY | Age: 35
End: 2020-07-14

## 2020-07-14 DIAGNOSIS — N89.8 VAGINAL ITCHING: Primary | ICD-10-CM

## 2020-07-14 DIAGNOSIS — N89.8 VAGINAL DISCHARGE: ICD-10-CM

## 2020-07-14 LAB
EST. AVERAGE GLUCOSE BLD GHB EST-MCNC: 91 MG/DL
HBA1C MFR BLD: 4.8 % (ref 4–5.6)

## 2020-07-14 NOTE — PERIOP NOTES
SPOKE WITH CHALINO/DR ELLIS RE: ABN. PREOP LABS:  HGB 7.4 , HCT 26.0, PLT. CT. 512. TYPE AND SCREEN COMPLETED. INQUIRY RE: NEED FOR BLOOD SET UP FOR DOS.

## 2020-07-14 NOTE — TELEPHONE ENCOUNTER
Radha Beard with PATs called with the following abnormal labs, surgery scheduled for 7/27/2020    HGB 7.4  HCT 26      A type and screen has been set up per beba in PATs

## 2020-07-17 ENCOUNTER — HOSPITAL ENCOUNTER (OUTPATIENT)
Dept: GENERAL RADIOLOGY | Age: 35
Discharge: HOME | End: 2020-07-17
Attending: OBSTETRICS & GYNECOLOGY
Payer: MEDICAID

## 2020-07-17 PROCEDURE — 71046 X-RAY EXAM CHEST 2 VIEWS: CPT

## 2020-07-23 ENCOUNTER — HOSPITAL ENCOUNTER (OUTPATIENT)
Dept: PREADMISSION TESTING | Age: 35
Discharge: HOME | End: 2020-07-23
Payer: MEDICAID

## 2020-07-23 DIAGNOSIS — U07.1 COVID-19: ICD-10-CM

## 2020-07-23 PROCEDURE — 87635 SARS-COV-2 COVID-19 AMP PRB: CPT

## 2020-07-24 ENCOUNTER — ANESTHESIA EVENT (OUTPATIENT)
Dept: SURGERY | Age: 35
End: 2020-07-24
Payer: MEDICAID

## 2020-07-25 LAB — SARS-COV-2, COV2NT: NOT DETECTED

## 2020-07-27 ENCOUNTER — HOSPITAL ENCOUNTER (OUTPATIENT)
Age: 35
Setting detail: OBSERVATION
Discharge: HOME | End: 2020-07-29
Attending: OBSTETRICS & GYNECOLOGY | Admitting: OBSTETRICS & GYNECOLOGY
Payer: MEDICAID

## 2020-07-27 ENCOUNTER — ANESTHESIA (OUTPATIENT)
Dept: SURGERY | Age: 35
End: 2020-07-27
Payer: MEDICAID

## 2020-07-27 DIAGNOSIS — G89.18 POSTOPERATIVE PAIN: Primary | ICD-10-CM

## 2020-07-27 PROBLEM — N93.8 DUB (DYSFUNCTIONAL UTERINE BLEEDING): Status: ACTIVE | Noted: 2020-07-27

## 2020-07-27 PROBLEM — D25.9 FIBROID UTERUS: Status: ACTIVE | Noted: 2020-07-27

## 2020-07-27 LAB
HCG UR QL: NEGATIVE
HGB BLD-MCNC: 7.2 G/DL (ref 11.5–16)

## 2020-07-27 PROCEDURE — 85018 HEMOGLOBIN: CPT

## 2020-07-27 PROCEDURE — 77030022704 HC SUT VLOC COVD -B: Performed by: OBSTETRICS & GYNECOLOGY

## 2020-07-27 PROCEDURE — 99218 HC RM OBSERVATION: CPT

## 2020-07-27 PROCEDURE — 77030012799 HC TRCR GELPRT BLN AMR -B: Performed by: OBSTETRICS & GYNECOLOGY

## 2020-07-27 PROCEDURE — 76010000135 HC OR TIME 4 TO 4.5 HR: Performed by: OBSTETRICS & GYNECOLOGY

## 2020-07-27 PROCEDURE — 76210000001 HC OR PH I REC 2.5 TO 3 HR: Performed by: OBSTETRICS & GYNECOLOGY

## 2020-07-27 PROCEDURE — 74011250636 HC RX REV CODE- 250/636: Performed by: PHYSICIAN ASSISTANT

## 2020-07-27 PROCEDURE — 77030020829: Performed by: OBSTETRICS & GYNECOLOGY

## 2020-07-27 PROCEDURE — 77030018836 HC SOL IRR NACL ICUM -A: Performed by: OBSTETRICS & GYNECOLOGY

## 2020-07-27 PROCEDURE — 77030008608 HC TRCR ENDOSC SMTH AMR -B: Performed by: OBSTETRICS & GYNECOLOGY

## 2020-07-27 PROCEDURE — 74011000250 HC RX REV CODE- 250: Performed by: OBSTETRICS & GYNECOLOGY

## 2020-07-27 PROCEDURE — 77030008756 HC TU IRR SUC STRY -B: Performed by: OBSTETRICS & GYNECOLOGY

## 2020-07-27 PROCEDURE — 77030040830 HC CATH URETH FOL MDII -A: Performed by: OBSTETRICS & GYNECOLOGY

## 2020-07-27 PROCEDURE — 74011000250 HC RX REV CODE- 250: Performed by: NURSE ANESTHETIST, CERTIFIED REGISTERED

## 2020-07-27 PROCEDURE — 74011000258 HC RX REV CODE- 258: Performed by: OBSTETRICS & GYNECOLOGY

## 2020-07-27 PROCEDURE — 77030009957 HC RELD ENDOSTCH COVD -C: Performed by: OBSTETRICS & GYNECOLOGY

## 2020-07-27 PROCEDURE — 77030002933 HC SUT MCRYL J&J -A: Performed by: OBSTETRICS & GYNECOLOGY

## 2020-07-27 PROCEDURE — 77030002966 HC SUT PDS J&J -A: Performed by: OBSTETRICS & GYNECOLOGY

## 2020-07-27 PROCEDURE — 77030010507 HC ADH SKN DERMBND J&J -B: Performed by: OBSTETRICS & GYNECOLOGY

## 2020-07-27 PROCEDURE — 77030031139 HC SUT VCRL2 J&J -A: Performed by: OBSTETRICS & GYNECOLOGY

## 2020-07-27 PROCEDURE — 74011250636 HC RX REV CODE- 250/636: Performed by: NURSE ANESTHETIST, CERTIFIED REGISTERED

## 2020-07-27 PROCEDURE — 77030041397 HC DRSG PRIMASEAL AG MDII -B: Performed by: OBSTETRICS & GYNECOLOGY

## 2020-07-27 PROCEDURE — 74011000258 HC RX REV CODE- 258: Performed by: PHYSICIAN ASSISTANT

## 2020-07-27 PROCEDURE — 77030010031 HC SCIS ENDOSC MPLR J&J -C: Performed by: OBSTETRICS & GYNECOLOGY

## 2020-07-27 PROCEDURE — 77030018778 HC MANIP UTER VCAR CNMD -B: Performed by: OBSTETRICS & GYNECOLOGY

## 2020-07-27 PROCEDURE — 88307 TISSUE EXAM BY PATHOLOGIST: CPT

## 2020-07-27 PROCEDURE — P9016 RBC LEUKOCYTES REDUCED: HCPCS

## 2020-07-27 PROCEDURE — 81025 URINE PREGNANCY TEST: CPT

## 2020-07-27 PROCEDURE — 76060000039 HC ANESTHESIA 4 TO 4.5 HR: Performed by: OBSTETRICS & GYNECOLOGY

## 2020-07-27 PROCEDURE — 77030040361 HC SLV COMPR DVT MDII -B: Performed by: OBSTETRICS & GYNECOLOGY

## 2020-07-27 PROCEDURE — 77030016151 HC PROTCTR LNS DFOG COVD -B: Performed by: OBSTETRICS & GYNECOLOGY

## 2020-07-27 PROCEDURE — 77030014008 HC SPNG HEMSTAT J&J -C: Performed by: OBSTETRICS & GYNECOLOGY

## 2020-07-27 PROCEDURE — 77030022703 HC LIGASURE  BLNT LAPSCP SEAL COVD -E: Performed by: OBSTETRICS & GYNECOLOGY

## 2020-07-27 PROCEDURE — 77030011640 HC PAD GRND REM COVD -A: Performed by: OBSTETRICS & GYNECOLOGY

## 2020-07-27 PROCEDURE — 77030008771 HC TU NG SALEM SUMP -A: Performed by: ANESTHESIOLOGY

## 2020-07-27 PROCEDURE — 74011250636 HC RX REV CODE- 250/636: Performed by: ANESTHESIOLOGY

## 2020-07-27 PROCEDURE — 88331 PATH CONSLTJ SURG 1 BLK 1SPC: CPT

## 2020-07-27 PROCEDURE — 77030008684 HC TU ET CUF COVD -B: Performed by: ANESTHESIOLOGY

## 2020-07-27 PROCEDURE — 74011250637 HC RX REV CODE- 250/637: Performed by: PHYSICIAN ASSISTANT

## 2020-07-27 PROCEDURE — 77030020263 HC SOL INJ SOD CL0.9% LFCR 1000ML: Performed by: OBSTETRICS & GYNECOLOGY

## 2020-07-27 PROCEDURE — 77030012770 HC TRCR OPT FX AMR -B: Performed by: OBSTETRICS & GYNECOLOGY

## 2020-07-27 PROCEDURE — 74011000250 HC RX REV CODE- 250: Performed by: PHYSICIAN ASSISTANT

## 2020-07-27 RX ORDER — FENTANYL CITRATE 50 UG/ML
50 INJECTION, SOLUTION INTRAMUSCULAR; INTRAVENOUS AS NEEDED
Status: DISCONTINUED | OUTPATIENT
Start: 2020-07-27 | End: 2020-07-27 | Stop reason: HOSPADM

## 2020-07-27 RX ORDER — SODIUM CHLORIDE, SODIUM LACTATE, POTASSIUM CHLORIDE, CALCIUM CHLORIDE 600; 310; 30; 20 MG/100ML; MG/100ML; MG/100ML; MG/100ML
100 INJECTION, SOLUTION INTRAVENOUS CONTINUOUS
Status: DISCONTINUED | OUTPATIENT
Start: 2020-07-27 | End: 2020-07-27 | Stop reason: HOSPADM

## 2020-07-27 RX ORDER — DIPHENHYDRAMINE HYDROCHLORIDE 50 MG/ML
12.5 INJECTION, SOLUTION INTRAMUSCULAR; INTRAVENOUS AS NEEDED
Status: DISCONTINUED | OUTPATIENT
Start: 2020-07-27 | End: 2020-07-27 | Stop reason: HOSPADM

## 2020-07-27 RX ORDER — PROPOFOL 10 MG/ML
INJECTION, EMULSION INTRAVENOUS AS NEEDED
Status: DISCONTINUED | OUTPATIENT
Start: 2020-07-27 | End: 2020-07-27 | Stop reason: HOSPADM

## 2020-07-27 RX ORDER — SODIUM CHLORIDE 9 MG/ML
250 INJECTION, SOLUTION INTRAVENOUS AS NEEDED
Status: DISCONTINUED | OUTPATIENT
Start: 2020-07-27 | End: 2020-07-29 | Stop reason: HOSPADM

## 2020-07-27 RX ORDER — SODIUM CHLORIDE, SODIUM LACTATE, POTASSIUM CHLORIDE, CALCIUM CHLORIDE 600; 310; 30; 20 MG/100ML; MG/100ML; MG/100ML; MG/100ML
25 INJECTION, SOLUTION INTRAVENOUS CONTINUOUS
Status: DISCONTINUED | OUTPATIENT
Start: 2020-07-27 | End: 2020-07-27 | Stop reason: HOSPADM

## 2020-07-27 RX ORDER — ROPIVACAINE HYDROCHLORIDE 5 MG/ML
30 INJECTION, SOLUTION EPIDURAL; INFILTRATION; PERINEURAL AS NEEDED
Status: DISCONTINUED | OUTPATIENT
Start: 2020-07-27 | End: 2020-07-27 | Stop reason: HOSPADM

## 2020-07-27 RX ORDER — MORPHINE SULFATE 10 MG/ML
2 INJECTION, SOLUTION INTRAMUSCULAR; INTRAVENOUS
Status: DISCONTINUED | OUTPATIENT
Start: 2020-07-27 | End: 2020-07-27 | Stop reason: HOSPADM

## 2020-07-27 RX ORDER — SODIUM CHLORIDE, SODIUM LACTATE, POTASSIUM CHLORIDE, CALCIUM CHLORIDE 600; 310; 30; 20 MG/100ML; MG/100ML; MG/100ML; MG/100ML
125 INJECTION, SOLUTION INTRAVENOUS CONTINUOUS
Status: DISCONTINUED | OUTPATIENT
Start: 2020-07-27 | End: 2020-07-29

## 2020-07-27 RX ORDER — KETOROLAC TROMETHAMINE 30 MG/ML
15 INJECTION, SOLUTION INTRAMUSCULAR; INTRAVENOUS
Status: COMPLETED | OUTPATIENT
Start: 2020-07-27 | End: 2020-07-27

## 2020-07-27 RX ORDER — SODIUM CHLORIDE 0.9 % (FLUSH) 0.9 %
5-40 SYRINGE (ML) INJECTION EVERY 8 HOURS
Status: DISCONTINUED | OUTPATIENT
Start: 2020-07-27 | End: 2020-07-27 | Stop reason: HOSPADM

## 2020-07-27 RX ORDER — SODIUM CHLORIDE 9 MG/ML
25 INJECTION, SOLUTION INTRAVENOUS CONTINUOUS
Status: DISCONTINUED | OUTPATIENT
Start: 2020-07-27 | End: 2020-07-27 | Stop reason: HOSPADM

## 2020-07-27 RX ORDER — BUPIVACAINE HYDROCHLORIDE 5 MG/ML
INJECTION, SOLUTION EPIDURAL; INTRACAUDAL AS NEEDED
Status: DISCONTINUED | OUTPATIENT
Start: 2020-07-27 | End: 2020-07-27 | Stop reason: HOSPADM

## 2020-07-27 RX ORDER — SODIUM CHLORIDE 0.9 % (FLUSH) 0.9 %
5-40 SYRINGE (ML) INJECTION EVERY 8 HOURS
Status: DISCONTINUED | OUTPATIENT
Start: 2020-07-27 | End: 2020-07-29 | Stop reason: HOSPADM

## 2020-07-27 RX ORDER — LIDOCAINE HYDROCHLORIDE 20 MG/ML
INJECTION, SOLUTION EPIDURAL; INFILTRATION; INTRACAUDAL; PERINEURAL AS NEEDED
Status: DISCONTINUED | OUTPATIENT
Start: 2020-07-27 | End: 2020-07-27 | Stop reason: HOSPADM

## 2020-07-27 RX ORDER — DOCUSATE SODIUM 100 MG/1
200 CAPSULE, LIQUID FILLED ORAL DAILY
Status: DISCONTINUED | OUTPATIENT
Start: 2020-07-28 | End: 2020-07-29 | Stop reason: HOSPADM

## 2020-07-27 RX ORDER — SODIUM CHLORIDE 9 MG/ML
INJECTION, SOLUTION INTRAVENOUS
Status: DISCONTINUED | OUTPATIENT
Start: 2020-07-27 | End: 2020-07-27 | Stop reason: HOSPADM

## 2020-07-27 RX ORDER — LIDOCAINE HYDROCHLORIDE 10 MG/ML
0.1 INJECTION, SOLUTION EPIDURAL; INFILTRATION; INTRACAUDAL; PERINEURAL AS NEEDED
Status: DISCONTINUED | OUTPATIENT
Start: 2020-07-27 | End: 2020-07-27 | Stop reason: HOSPADM

## 2020-07-27 RX ORDER — OXYCODONE HYDROCHLORIDE 5 MG/1
5-10 TABLET ORAL
Status: DISCONTINUED | OUTPATIENT
Start: 2020-07-27 | End: 2020-07-29

## 2020-07-27 RX ORDER — SODIUM CHLORIDE, SODIUM LACTATE, POTASSIUM CHLORIDE, CALCIUM CHLORIDE 600; 310; 30; 20 MG/100ML; MG/100ML; MG/100ML; MG/100ML
INJECTION, SOLUTION INTRAVENOUS
Status: DISCONTINUED | OUTPATIENT
Start: 2020-07-27 | End: 2020-07-27 | Stop reason: HOSPADM

## 2020-07-27 RX ORDER — OXYCODONE HYDROCHLORIDE 5 MG/1
5 TABLET ORAL AS NEEDED
Status: DISCONTINUED | OUTPATIENT
Start: 2020-07-27 | End: 2020-07-27 | Stop reason: HOSPADM

## 2020-07-27 RX ORDER — ONDANSETRON 2 MG/ML
4 INJECTION INTRAMUSCULAR; INTRAVENOUS AS NEEDED
Status: DISCONTINUED | OUTPATIENT
Start: 2020-07-27 | End: 2020-07-27 | Stop reason: HOSPADM

## 2020-07-27 RX ORDER — LIDOCAINE 4 G/100G
1 PATCH TOPICAL EVERY 24 HOURS
Status: DISCONTINUED | OUTPATIENT
Start: 2020-07-27 | End: 2020-07-29 | Stop reason: HOSPADM

## 2020-07-27 RX ORDER — SUCCINYLCHOLINE CHLORIDE 20 MG/ML
INJECTION INTRAMUSCULAR; INTRAVENOUS AS NEEDED
Status: DISCONTINUED | OUTPATIENT
Start: 2020-07-27 | End: 2020-07-27 | Stop reason: HOSPADM

## 2020-07-27 RX ORDER — PHENYLEPHRINE HCL IN 0.9% NACL 0.4MG/10ML
SYRINGE (ML) INTRAVENOUS AS NEEDED
Status: DISCONTINUED | OUTPATIENT
Start: 2020-07-27 | End: 2020-07-27 | Stop reason: HOSPADM

## 2020-07-27 RX ORDER — ONDANSETRON 2 MG/ML
INJECTION INTRAMUSCULAR; INTRAVENOUS AS NEEDED
Status: DISCONTINUED | OUTPATIENT
Start: 2020-07-27 | End: 2020-07-27 | Stop reason: HOSPADM

## 2020-07-27 RX ORDER — HYDROMORPHONE HYDROCHLORIDE 1 MG/ML
0.2 INJECTION, SOLUTION INTRAMUSCULAR; INTRAVENOUS; SUBCUTANEOUS
Status: DISCONTINUED | OUTPATIENT
Start: 2020-07-27 | End: 2020-07-27 | Stop reason: HOSPADM

## 2020-07-27 RX ORDER — SODIUM CHLORIDE 0.9 % (FLUSH) 0.9 %
5-40 SYRINGE (ML) INJECTION AS NEEDED
Status: DISCONTINUED | OUTPATIENT
Start: 2020-07-27 | End: 2020-07-27 | Stop reason: HOSPADM

## 2020-07-27 RX ORDER — HYDROMORPHONE HYDROCHLORIDE 1 MG/ML
0.5 INJECTION, SOLUTION INTRAMUSCULAR; INTRAVENOUS; SUBCUTANEOUS
Status: DISCONTINUED | OUTPATIENT
Start: 2020-07-27 | End: 2020-07-28

## 2020-07-27 RX ORDER — DEXAMETHASONE SODIUM PHOSPHATE 4 MG/ML
INJECTION, SOLUTION INTRA-ARTICULAR; INTRALESIONAL; INTRAMUSCULAR; INTRAVENOUS; SOFT TISSUE AS NEEDED
Status: DISCONTINUED | OUTPATIENT
Start: 2020-07-27 | End: 2020-07-27 | Stop reason: HOSPADM

## 2020-07-27 RX ORDER — MIDAZOLAM HYDROCHLORIDE 1 MG/ML
1 INJECTION, SOLUTION INTRAMUSCULAR; INTRAVENOUS AS NEEDED
Status: DISCONTINUED | OUTPATIENT
Start: 2020-07-27 | End: 2020-07-27 | Stop reason: HOSPADM

## 2020-07-27 RX ORDER — FENTANYL CITRATE 50 UG/ML
INJECTION, SOLUTION INTRAMUSCULAR; INTRAVENOUS AS NEEDED
Status: DISCONTINUED | OUTPATIENT
Start: 2020-07-27 | End: 2020-07-27 | Stop reason: HOSPADM

## 2020-07-27 RX ORDER — HYDROMORPHONE HYDROCHLORIDE 2 MG/ML
INJECTION, SOLUTION INTRAMUSCULAR; INTRAVENOUS; SUBCUTANEOUS AS NEEDED
Status: DISCONTINUED | OUTPATIENT
Start: 2020-07-27 | End: 2020-07-27 | Stop reason: HOSPADM

## 2020-07-27 RX ORDER — MIDAZOLAM HYDROCHLORIDE 1 MG/ML
0.5 INJECTION, SOLUTION INTRAMUSCULAR; INTRAVENOUS
Status: DISCONTINUED | OUTPATIENT
Start: 2020-07-27 | End: 2020-07-27 | Stop reason: HOSPADM

## 2020-07-27 RX ORDER — FENTANYL CITRATE 50 UG/ML
25 INJECTION, SOLUTION INTRAMUSCULAR; INTRAVENOUS
Status: COMPLETED | OUTPATIENT
Start: 2020-07-27 | End: 2020-07-27

## 2020-07-27 RX ORDER — SODIUM CHLORIDE 0.9 % (FLUSH) 0.9 %
5-40 SYRINGE (ML) INJECTION AS NEEDED
Status: DISCONTINUED | OUTPATIENT
Start: 2020-07-27 | End: 2020-07-29 | Stop reason: HOSPADM

## 2020-07-27 RX ORDER — DEXMEDETOMIDINE HYDROCHLORIDE 100 UG/ML
INJECTION, SOLUTION INTRAVENOUS AS NEEDED
Status: DISCONTINUED | OUTPATIENT
Start: 2020-07-27 | End: 2020-07-27 | Stop reason: HOSPADM

## 2020-07-27 RX ORDER — GLYCOPYRROLATE 0.2 MG/ML
INJECTION INTRAMUSCULAR; INTRAVENOUS AS NEEDED
Status: DISCONTINUED | OUTPATIENT
Start: 2020-07-27 | End: 2020-07-27 | Stop reason: HOSPADM

## 2020-07-27 RX ORDER — ONDANSETRON 2 MG/ML
4 INJECTION INTRAMUSCULAR; INTRAVENOUS
Status: DISCONTINUED | OUTPATIENT
Start: 2020-07-27 | End: 2020-07-29 | Stop reason: HOSPADM

## 2020-07-27 RX ORDER — NEOSTIGMINE METHYLSULFATE 1 MG/ML
INJECTION INTRAVENOUS AS NEEDED
Status: DISCONTINUED | OUTPATIENT
Start: 2020-07-27 | End: 2020-07-27 | Stop reason: HOSPADM

## 2020-07-27 RX ORDER — ROCURONIUM BROMIDE 10 MG/ML
INJECTION, SOLUTION INTRAVENOUS AS NEEDED
Status: DISCONTINUED | OUTPATIENT
Start: 2020-07-27 | End: 2020-07-27 | Stop reason: HOSPADM

## 2020-07-27 RX ORDER — NALOXONE HYDROCHLORIDE 0.4 MG/ML
0.4 INJECTION, SOLUTION INTRAMUSCULAR; INTRAVENOUS; SUBCUTANEOUS AS NEEDED
Status: DISCONTINUED | OUTPATIENT
Start: 2020-07-27 | End: 2020-07-29 | Stop reason: HOSPADM

## 2020-07-27 RX ORDER — MIDAZOLAM HYDROCHLORIDE 1 MG/ML
INJECTION, SOLUTION INTRAMUSCULAR; INTRAVENOUS AS NEEDED
Status: DISCONTINUED | OUTPATIENT
Start: 2020-07-27 | End: 2020-07-27 | Stop reason: HOSPADM

## 2020-07-27 RX ADMIN — PROPOFOL 200 MG: 10 INJECTION, EMULSION INTRAVENOUS at 07:34

## 2020-07-27 RX ADMIN — SODIUM CHLORIDE, POTASSIUM CHLORIDE, SODIUM LACTATE AND CALCIUM CHLORIDE: 600; 310; 30; 20 INJECTION, SOLUTION INTRAVENOUS at 07:26

## 2020-07-27 RX ADMIN — DEXMEDETOMIDINE HYDROCHLORIDE 4 MCG: 100 INJECTION, SOLUTION, CONCENTRATE INTRAVENOUS at 11:22

## 2020-07-27 RX ADMIN — FENTANYL CITRATE 150 MCG: 50 INJECTION, SOLUTION INTRAMUSCULAR; INTRAVENOUS at 07:35

## 2020-07-27 RX ADMIN — FENTANYL CITRATE 50 MCG: 50 INJECTION, SOLUTION INTRAMUSCULAR; INTRAVENOUS at 07:32

## 2020-07-27 RX ADMIN — SUCCINYLCHOLINE CHLORIDE 200 MG: 20 INJECTION, SOLUTION INTRAMUSCULAR; INTRAVENOUS at 07:35

## 2020-07-27 RX ADMIN — FENTANYL CITRATE 50 MCG: 50 INJECTION, SOLUTION INTRAMUSCULAR; INTRAVENOUS at 07:26

## 2020-07-27 RX ADMIN — Medication 120 MCG: at 08:32

## 2020-07-27 RX ADMIN — SODIUM CHLORIDE: 900 INJECTION, SOLUTION INTRAVENOUS at 09:24

## 2020-07-27 RX ADMIN — NEOSTIGMINE METHYLSULFATE 4 MG: 1 INJECTION, SOLUTION INTRAVENOUS at 11:01

## 2020-07-27 RX ADMIN — MIDAZOLAM 2 MG: 1 INJECTION INTRAMUSCULAR; INTRAVENOUS at 07:26

## 2020-07-27 RX ADMIN — FENTANYL CITRATE 25 MCG: 50 INJECTION, SOLUTION INTRAMUSCULAR; INTRAVENOUS at 12:32

## 2020-07-27 RX ADMIN — ROCURONIUM BROMIDE 10 MG: 10 SOLUTION INTRAVENOUS at 07:34

## 2020-07-27 RX ADMIN — OXYCODONE 10 MG: 5 TABLET ORAL at 22:14

## 2020-07-27 RX ADMIN — ROCURONIUM BROMIDE 10 MG: 10 SOLUTION INTRAVENOUS at 09:16

## 2020-07-27 RX ADMIN — SODIUM CHLORIDE: 900 INJECTION, SOLUTION INTRAVENOUS at 07:44

## 2020-07-27 RX ADMIN — DEXMEDETOMIDINE HYDROCHLORIDE 4 MCG: 100 INJECTION, SOLUTION, CONCENTRATE INTRAVENOUS at 10:51

## 2020-07-27 RX ADMIN — HYDROMORPHONE HYDROCHLORIDE 0.5 MG: 2 INJECTION, SOLUTION INTRAMUSCULAR; INTRAVENOUS; SUBCUTANEOUS at 11:22

## 2020-07-27 RX ADMIN — ROCURONIUM BROMIDE 15 MG: 10 SOLUTION INTRAVENOUS at 10:03

## 2020-07-27 RX ADMIN — SODIUM CHLORIDE, SODIUM LACTATE, POTASSIUM CHLORIDE, AND CALCIUM CHLORIDE 25 ML/HR: 600; 310; 30; 20 INJECTION, SOLUTION INTRAVENOUS at 07:11

## 2020-07-27 RX ADMIN — HYDROMORPHONE HYDROCHLORIDE 0.5 MG: 2 INJECTION, SOLUTION INTRAMUSCULAR; INTRAVENOUS; SUBCUTANEOUS at 10:58

## 2020-07-27 RX ADMIN — FENTANYL CITRATE 25 MCG: 50 INJECTION, SOLUTION INTRAMUSCULAR; INTRAVENOUS at 13:12

## 2020-07-27 RX ADMIN — HYDROMORPHONE HYDROCHLORIDE 0.2 MG: 1 INJECTION, SOLUTION INTRAMUSCULAR; INTRAVENOUS; SUBCUTANEOUS at 14:08

## 2020-07-27 RX ADMIN — KETOROLAC TROMETHAMINE 15 MG: 30 INJECTION, SOLUTION INTRAMUSCULAR at 15:10

## 2020-07-27 RX ADMIN — GLYCOPYRROLATE 0.4 MG: 0.2 INJECTION, SOLUTION INTRAMUSCULAR; INTRAVENOUS at 11:01

## 2020-07-27 RX ADMIN — HYDROMORPHONE HYDROCHLORIDE 0.5 MG: 1 INJECTION, SOLUTION INTRAMUSCULAR; INTRAVENOUS; SUBCUTANEOUS at 16:12

## 2020-07-27 RX ADMIN — ROCURONIUM BROMIDE 40 MG: 10 SOLUTION INTRAVENOUS at 07:42

## 2020-07-27 RX ADMIN — FENTANYL CITRATE 25 MCG: 50 INJECTION, SOLUTION INTRAMUSCULAR; INTRAVENOUS at 12:24

## 2020-07-27 RX ADMIN — HYDROMORPHONE HYDROCHLORIDE 0.5 MG: 2 INJECTION, SOLUTION INTRAMUSCULAR; INTRAVENOUS; SUBCUTANEOUS at 11:06

## 2020-07-27 RX ADMIN — HYDROMORPHONE HYDROCHLORIDE 0.3 MG: 1 INJECTION, SOLUTION INTRAMUSCULAR; INTRAVENOUS; SUBCUTANEOUS at 13:51

## 2020-07-27 RX ADMIN — CEFOTETAN DISODIUM 2 G: 2 INJECTION, POWDER, FOR SOLUTION INTRAMUSCULAR; INTRAVENOUS at 07:43

## 2020-07-27 RX ADMIN — ONDANSETRON HYDROCHLORIDE 4 MG: 2 INJECTION, SOLUTION INTRAMUSCULAR; INTRAVENOUS at 10:47

## 2020-07-27 RX ADMIN — ROCURONIUM BROMIDE 10 MG: 10 SOLUTION INTRAVENOUS at 08:50

## 2020-07-27 RX ADMIN — DEXAMETHASONE SODIUM PHOSPHATE 8 MG: 4 INJECTION, SOLUTION INTRAMUSCULAR; INTRAVENOUS at 08:06

## 2020-07-27 RX ADMIN — DEXMEDETOMIDINE HYDROCHLORIDE 4 MCG: 100 INJECTION, SOLUTION, CONCENTRATE INTRAVENOUS at 11:06

## 2020-07-27 RX ADMIN — METHYLENE BLUE 5 ML: 5 INJECTION INTRAVENOUS at 09:42

## 2020-07-27 RX ADMIN — DEXMEDETOMIDINE HYDROCHLORIDE 4 MCG: 100 INJECTION, SOLUTION, CONCENTRATE INTRAVENOUS at 10:59

## 2020-07-27 RX ADMIN — ROCURONIUM BROMIDE 10 MG: 10 SOLUTION INTRAVENOUS at 09:22

## 2020-07-27 RX ADMIN — FENTANYL CITRATE 100 MCG: 50 INJECTION, SOLUTION INTRAMUSCULAR; INTRAVENOUS at 08:04

## 2020-07-27 RX ADMIN — FENTANYL CITRATE 25 MCG: 50 INJECTION, SOLUTION INTRAMUSCULAR; INTRAVENOUS at 13:21

## 2020-07-27 RX ADMIN — Medication 10 ML: at 15:19

## 2020-07-27 RX ADMIN — GLYCOPYRROLATE 0.2 MG: 0.2 INJECTION, SOLUTION INTRAMUSCULAR; INTRAVENOUS at 07:26

## 2020-07-27 RX ADMIN — LIDOCAINE HYDROCHLORIDE 100 MG: 20 INJECTION, SOLUTION EPIDURAL; INFILTRATION; INTRACAUDAL; PERINEURAL at 07:34

## 2020-07-27 RX ADMIN — SODIUM CHLORIDE, SODIUM LACTATE, POTASSIUM CHLORIDE, AND CALCIUM CHLORIDE 125 ML/HR: 600; 310; 30; 20 INJECTION, SOLUTION INTRAVENOUS at 19:03

## 2020-07-27 RX ADMIN — DEXMEDETOMIDINE HYDROCHLORIDE 4 MCG: 100 INJECTION, SOLUTION, CONCENTRATE INTRAVENOUS at 11:36

## 2020-07-27 RX ADMIN — HYDROMORPHONE HYDROCHLORIDE 0.3 MG: 1 INJECTION, SOLUTION INTRAMUSCULAR; INTRAVENOUS; SUBCUTANEOUS at 13:28

## 2020-07-27 RX ADMIN — CEFAZOLIN SODIUM 1 G: 1 INJECTION, POWDER, FOR SOLUTION INTRAMUSCULAR; INTRAVENOUS at 15:12

## 2020-07-27 RX ADMIN — HYDROMORPHONE HYDROCHLORIDE 0.5 MG: 2 INJECTION, SOLUTION INTRAMUSCULAR; INTRAVENOUS; SUBCUTANEOUS at 10:03

## 2020-07-27 NOTE — INTERVAL H&P NOTE
Date of Surgery Update:  Esha July was seen and examined. History and physical has been reviewed. The patient has been examined. There have been no significant clinical changes since the completion of the originally dated History and Physical. Proceed with hysterectomy, BS. Plan for ovarian preservation. Will proceed with staging if indicated based on frozen pathology. Repeat Hgb/Hct. Will T&C given anemia.      Signed By: Ward Menjivar MD     July 27, 2020 6:55 AM

## 2020-07-27 NOTE — PERIOP NOTES
Family called and informed of patient's progress at start of case and again at [address]  MD DURING PROCEDURE  REF: [id number]  LOT: [id number]  EXP: 9/30/22

## 2020-07-27 NOTE — ANESTHESIA POSTPROCEDURE EVALUATION
Post-Anesthesia Evaluation and Assessment    Patient: Oren Valadez MRN: [id number]  SSN: [id number]    YOB: 1985  Age: 38 y.o. Sex: female      I have evaluated the patient and they are stable and ready for discharge from the PACU. Cardiovascular Function/Vital Signs  Visit Vitals  /74   Pulse 68   Temp 36.4 °C (97.5 °F)   Resp 12   Ht 5' 6\" (1.676 m)   Wt 127.9 kg (282 lb)   SpO2 97%   BMI 45.52 kg/m²       Patient is status post General anesthesia for Procedure(s):  DIAGNOSTIC LAPAROSCOPY, RESECTION OF MASS, TOTAL LAPAROSCOPIC HYSTERECTOMY, BILATERAL SALPINGECTOMY, MINI LAPAROTOMY. Nausea/Vomiting: None    Postoperative hydration reviewed and adequate. Pain:  Pain Scale 1: Numeric (0 - 10) (07/27/20 1408)  Pain Intensity 1: 5 (07/27/20 1408)   Managed    Neurological Status:   Neuro (WDL): Exceptions to WDL (07/27/20 1347)  Neuro  Neurologic State: Drowsy (07/27/20 1347)  LUE Motor Response: Purposeful (07/27/20 1347)  LLE Motor Response: Purposeful (07/27/20 1347)  RUE Motor Response: Purposeful (07/27/20 1347)  RLE Motor Response: Purposeful (07/27/20 1347)   At baseline    Mental Status, Level of Consciousness: Alert and  oriented to person, place, and time    Pulmonary Status:   O2 Device: Room air (07/27/20 1347)   Adequate oxygenation and airway patent    Complications related to anesthesia: None    Post-anesthesia assessment completed. No concerns    Signed By: Shandra Cornejo MD     July 27, 2020              Procedure(s):  DIAGNOSTIC LAPAROSCOPY, RESECTION OF MASS, TOTAL LAPAROSCOPIC HYSTERECTOMY, BILATERAL SALPINGECTOMY, MINI LAPAROTOMY. general    <BSHSIANPOST>    INITIAL Post-op Vital signs:   Vitals Value Taken Time   /74 7/27/2020  2:30 PM   Temp 36.4 °C (97.5 °F) 7/27/2020 11:48 AM   Pulse 67 7/27/2020  2:42 PM   Resp 8 7/27/2020  2:42 PM   SpO2 98 % 7/27/2020  2:42 PM   Vitals shown include unvalidated device data.

## 2020-07-27 NOTE — PROGRESS NOTES
-  State Observation notice (OBS) provided in writing to patient and placed on chart of Cathy Valente y.o. old/female  as well as verbal explanation of the policy. Patients who are in outpatient status also receive the Observation notice. Elaina Harman RN , CRM    Cathy Valente y.o. old/female  Room# 303/01                                                             [facility] Patient Guide to Observation & Outpatient Care     Thank you for choosing [facility]. Current regulatory requirements necessitate we inform you that [address] or OBSERVATION status receiving care in our facility. Outpatient services include Observation services. Following are some frequently asked questions and answers that will help you understand outpatient observation status and billing. What is outpatient observation? Observation services are hospital outpatient services that a physician orders to allow for testing and medical evaluation of your condition. Your physician will decide whether you require an inpatient stay, will require care in another setting, or will be discharged home. You will be admitted if you are expected to need two or more midnights of medically necessary hospital care. What kinds of conditions usually require observation care? Observation services are typically ordered for conditions that can be treated in less than two midnights after surgery, or when the cause for your symptoms has not been determined. Examples are nausea, vomiting, weakness, stomach pain, headache, kidney stones, fever, some breathing problems, and some types of chest pain. Does observation care count toward the three-day qualifying hospital stay needed for admission to a skilled nursing facility? No. Any of your time spent during an observation stay does not count toward Medicares three-day (consecutive) hospital stay rule. If your status changes from observation to inpatient, your three-day hospital day begins only from the time when you become an inpatient. How is an observation stay billed? An observation stay is billed under outpatient services (under Medicare this would be under Part B). What am I expected to pay for as an observation patient? Since observation stays are billed as outpatient services, your insurance co-pays and deductibles, along with any additional costs, including medications will be based on the outpatient terms of your policies. What if I dont have health insurance? If you do not have health care coverage, [facility] has financial counselors available to help with payment planning. Ask to meet with a counselor from Patient Financial Services. What happens when I reach the end of my observation stay? At the end of your observation stay, your physician will decide whether to discharge you from the hospital or to admit you as an inpatient. What if my physician decides my condition requires acute inpatient care? Your physician must then write an order to convert your outpatient observation stay to a full inpatient admission. Can I be placed into outpatient observation after undergoing an outpatient surgical procedure? It is possible. For example, Medicare allows for a 4-6 hour recovery period. The intent of outpatient surgery is to have your surgery and be discharged the same day. However, if you experience a post-operative complication, then your physician may place you into observation to monitor you further. If I want to spend the night after my out- patient surgery, will Medicare cover this? No, Medicare will only pay if there is a medical condition that warrants postoperative monitoring. If you desire to stay over for patient/family convenience, you will be fully responsible for payment. I have more questions about my Observation Stay. Who can help me?   · If you have any questions about your medical condition, please ask to speak with your care provider. ·        If you have questions about your transition home or services needed after discharge please ask to speak with a member     of the [facility] care management department. ·        If you have financial or billing questions please contact a [place] [address] services representative at [phone] or your insurance carrier. ·        If you receive Medicare benefits, you can also call 1-800-MEDICARE (1-757.586.6229) for more information. TTY users should call [phone]. Please sign and date here to show you received this notice and understand your status.      Signature of Patient or Guardian                                     Date

## 2020-07-27 NOTE — PERIOP NOTES
TRANSFER - OUT REPORT:    Verbal report given to Melo Cordoba (name) on Tom Govea  being transferred to [facility] (unit) for routine post - op       Report consisted of patients Situation, Background, Assessment and   Recommendations(SBAR). Time Pre op antibiotic YZAF  Anesthesia Stop time: 0127  Triplett Present on Transfer to floor:y  Order for Triplett on Chart:y  Discharge Prescriptions with Chart:    Information from the following report(s) SBAR, OR Summary, Intake/Output, MAR and Accordion was reviewed with the receiving nurse. Opportunity for questions and clarification was provided. Is the patient on 02? NO       L/Min        Other     Is the patient on a monitor? NO    Is the nurse transporting with the patient? NO    Surgical Waiting Area notified of patient's transfer from PACU?  YES      The following personal items collected during your admission accompanied patient upon transfer:   Dental Appliance: Dental Appliances: (clothes returne in pacu)  Vision:    Hearing Aid:    Jewelry:    Clothing: Clothing: (clothes in pacu)  Other Valuables:    Valuables sent to safe:      Clothes to floor with pt

## 2020-07-27 NOTE — OP NOTES
Gynecologic Oncology Operative Report    Eloise Zacarias  7/27/2020    PREOPERATIVE DIAGNOSIS:  1) Pelvic mass; 2) Uterine fibroids; 3) Morbid Obesity, BMI 45    POSTOPERATIVE DIAGNOSIS:  1) Necrotic uterine fibroids; 2) Morbid obesity, BMI 45    PROCEDURE:    Diagnostic laparoscopy, resection of mass, total laparoscopic hysterectomy with bilateral salpingectomy, mini-laparotomy    Surgeon:  Estevan Villasenor MD    Assistant:  Balbina Hillman surgical assistance was required throughout the case due to the complexity of the procedure. He assisted with dissection, development of the retroperitoneal spaces, and identification of pertinent anatomy during the procedure. Anesthesia:  General endotracheal anesthesia    EBL: 50cc    Preoperative antibiotics: Cefotetan 2 grams IV    VTE Prophylaxis: SCDs     Complications:  None    Implants: none    Specimens:  Uterus, cervix, bilateral fallopian tubes, pelvic mass     Operative indications:  38 y.o. female with large complex pelvic mass likely necrotic fibroids, which have been causing mild bilateral hydronephrosis     Operative findings: On laparoscopic survey, the uterus was 20 weeks size, although mobile and able to be lifted out of the pelvis. Two large 8-10cm fibroids/masses at the uterine fundus and posteriorly. Normal appearing bilateral tubes and ovaries. Normal appearing omentum, small bowel, rectosigmoid colon, liver edge, and diaphragm. Frozen pathology of uterine/pelvic mass consistent with necrotic uterine fibroid. A +22 modified will be added given the patient's obesity and size of the uterus, which added additional complexity to the procedure. Operative note:  After the risks, benefits, indications, and alternatives of the procedure were discussed with the patient and informed consent was obtained, the patient was taken to the operating room.   She was positively identified, administered general anesthesia, and then placed in the dorsal lithotomy position in [address] [place] [address]. An exam under anesthesia was performed with the above findings. She was then prepped and draped in the usual fashion. A bustillos catheter was inserted. A speculum was placed in the vagina and the cervix grasped with a tenaculum and dilated. Then a V-care uterine manipulator was placed without difficulty. A 28VQ umbilical incision was then made with #15-blade and carried down to the underlying fascia. The fascia was incised and the peritoneal cavity entered via an open Claudine technique. 10-mm balloon trocar was then placed and intra-peritoneal placement confirmed via direct visualization. The abdomen was surveyed with the above findings. The abdomen was then insufflated with CO2 to a pressure of 15mmHg. Bilateral right and left lower quadrant 5-mm trocars were then inserted under direct visualization, along with a 10-mm supra-pubic trocar. The patient was placed in Trendelenburg and the bowel displaced superiorly. Attention was then turned to the pelvis. Bilateral round ligaments were sealed and divided with the LigaSure device and the bilateral pelvic side-wall retroperitoneum entered and developed. Bilateral ureters were identified and preserved. Given the patient's age, bilateral ovaries were preserved. The mesosalpinx was sealed and divided with the LigaSure device. Bilateral utero-ovarian ligaments were skeletonized, then sealed and divided with the LigaSure device. Dissection was continued inferiorly along the broad ligament and the bladder dissected off the lower uterine segment and cervix. Bilateral uterine arteries were skeletonized, then sealed and divided with the LigaSure device. A circumferential colpotomy was then carried along the V-care. The specimen was too large to be removed via the vagina or to be placed in an EndoCatch bag.  The specimen was held superiorly by grasper through a lateral port, and attention was then turned to closure of the colpotomy using the EndoStitch and 0-Vicryl V-lock suture. After closing the colpotomy, attention was then turned to a mini-laparotomy via the patient's supra-pubic port. A mini-laparotomy via a small pfannenstiel skin incision was then made by extended the supra-pubic port laterally to each side. The incision was carried down to the fascia, and the fascia incised and extended laterally each direction. The superior aspect of the fascia was grasped with Kocher clamps, and the fascia dissected off the rectus abdominis muscle. The same was repeated along the inferior fascial edge. The 10-mm trocar was then removed and the incision extended both superiorly and inferiorly. The uterine cervix was then passed through the incision and the specimen was then removed in entirety via the mini-laparotomy and sent for frozen pathology. Frozen pathology consistent with a benign necrotic fibroid. Attention was then turned to closure of the mini-laparotomy. The peritoneum was re-approximated using 2-0 Vicryl suture in a running fashion. The fascia was then closed using 1-0 PDS suture. The area was then irrigated and closed in layers. Using 2-0 Vicryl suture, the subcutaneous space was re-approximated and then the skin was closed with 4-0 monocryl in a subcuticular fashion. Attention was then turned back to laparoscopy. The pelvis was irrigated and made hemostatic. The intra-abdominal pressure was then decreased and all planes of dissection were confirmed hemostatic. Fibrillar was placed along all dissection planes. The insufflation was released prior to removal of all port sites, then all trocars were removed. The umbilical fascia was reapproximated with a figure-of-8 stitch using 0-Vicryl on a UR-6 needle. All skin incisions were closed with 4-0 monocryl subcuticular stitch. Skin glue was applied to all skin incisions. The vagina was inspected with an intact vaginal cuff and no evidence of lacerations.  The patient was taken out of xenia, awakened from anesthesia, and taken to the recovery room in stable condition. The patient tolerated the procedure well. All instrument, sponge, and needle counts were correct.         Payal Martinez MD  7/27/2020  11:51 AM

## 2020-07-27 NOTE — ANESTHESIA PREPROCEDURE EVALUATION
Relevant Problems   No relevant active problems       Anesthetic History   No history of anesthetic complications            Review of Systems / Medical History  Patient summary reviewed, nursing notes reviewed and pertinent labs reviewed    Pulmonary          Smoker  Asthma        Neuro/Psych   Within defined limits           Cardiovascular  Within defined limits                Exercise tolerance: >4 METS     GI/Hepatic/Renal     GERD           Endo/Other        Morbid obesity and anemia     Other Findings              Physical Exam    Airway  Mallampati: II  TM Distance: > 6 cm  Neck ROM: normal range of motion   Mouth opening: Normal     Cardiovascular  Regular rate and rhythm,  S1 and S2 normal,  no murmur, click, rub, or gallop             Dental  No notable dental hx       Pulmonary  Breath sounds clear to auscultation               Abdominal  GI exam deferred       Other Findings            Anesthetic Plan    ASA: 3  Anesthesia type: general          Induction: Intravenous  Anesthetic plan and risks discussed with: Patient

## 2020-07-27 NOTE — BRIEF OP NOTE
Brief Postoperative Note    Patient: Chilango Vigil  YOB: 1985  MRN: [id number]    Date of Procedure: 7/27/2020     Pre-Op Diagnosis: PELVIC MASS    Post-Op Diagnosis: Necrotic uterine fibroids    Procedure(s):  DIAGNOSTIC LAPAROSCOPY, RESECTION OF MASS, TOTAL LAPAROSCOPIC HYSTERECTOMY, BILATERAL SALPINGECTOMY, MINI LAPAROTOMY    Surgeon(s): Henri Hartley MD    Surgical Assistant: Physician Assistant: Brittaney Dupree PA-C    Anesthesia: General     Estimated Blood Loss (mL): less than 50     Complications: None    Specimens:   ID Type Source Tests Collected by Time Destination   1 : UTERUS, CERVIX, BILATERAL FALLOPIAN TUBES Frozen Section Uterus with Bilateral Fallopian Tubes  Henri Hartley MD 7/27/2020 1009 Pathology        Implants: * No implants in log *    Drains: * No LDAs found *    Findings: On laparoscopic survey, the uterus was 20 weeks size, although mobile and able to be lifted out of the pelvis. Two large 8-10cm fibroids/masses at the uterine fundus and posteriorly. Normal appearing bilateral tubes and ovaries. Normal appearing omentum, small bowel, rectosigmoid colon, liver edge, and diaphragm. Frozen pathology of uterine/pelvic mass consistent with necrotic uterine fibroid.      Electronically Signed by Missael Jones MD on 7/27/2020 at 11:49 AM

## 2020-07-27 NOTE — PROGRESS NOTES
TRANSFER - IN REPORT:    Verbal report received from Treva Rincon 8141 RN(name) on Palak Scale  being received from [employer]) for routine post - op      Report consisted of patients Situation, Background, Assessment and   Recommendations(SBAR). Information from the following report(s) SBAR, Kardex, Intake/Output, MAR and Recent Results was reviewed with the receiving nurse. Opportunity for questions and clarification was provided. Assessment completed upon patients arrival to unit and care assumed.            1525: PA at bedside

## 2020-07-28 LAB
ABO + RH BLD: NORMAL
ANION GAP SERPL CALC-SCNC: 6 MMOL/L (ref 5–15)
BLD PROD TYP BPU: NORMAL
BLD PROD TYP BPU: NORMAL
BLOOD GROUP ANTIBODIES SERPL: NORMAL
BPU ID: NORMAL
BPU ID: NORMAL
BUN SERPL-MCNC: 9 MG/DL (ref 6–20)
BUN/CREAT SERPL: 12 (ref 12–20)
CALCIUM SERPL-MCNC: 7.9 MG/DL (ref 8.5–10.1)
CHLORIDE SERPL-SCNC: 109 MMOL/L (ref 97–108)
CO2 SERPL-SCNC: 24 MMOL/L (ref 21–32)
CREAT SERPL-MCNC: 0.73 MG/DL (ref 0.55–1.02)
CROSSMATCH RESULT,%XM: NORMAL
CROSSMATCH RESULT,%XM: NORMAL
ERYTHROCYTE [DISTWIDTH] IN BLOOD BY AUTOMATED COUNT: 25.5 % (ref 11.5–14.5)
GLUCOSE SERPL-MCNC: 94 MG/DL (ref 65–100)
HCT VFR BLD AUTO: 25.1 % (ref 35–47)
HGB BLD-MCNC: 7.3 G/DL (ref 11.5–16)
MCH RBC QN AUTO: 18 PG (ref 26–34)
MCHC RBC AUTO-ENTMCNC: 29.1 G/DL (ref 30–36.5)
MCV RBC AUTO: 62 FL (ref 80–99)
NRBC # BLD: 0 K/UL (ref 0–0.01)
NRBC BLD-RTO: 0 PER 100 WBC
PLATELET # BLD AUTO: 417 K/UL (ref 150–400)
PMV BLD AUTO: 8.8 FL (ref 8.9–12.9)
POTASSIUM SERPL-SCNC: 3.2 MMOL/L (ref 3.5–5.1)
RBC # BLD AUTO: 4.05 M/UL (ref 3.8–5.2)
SODIUM SERPL-SCNC: 139 MMOL/L (ref 136–145)
SPECIMEN EXP DATE BLD: NORMAL
STATUS OF UNIT,%ST: NORMAL
STATUS OF UNIT,%ST: NORMAL
UNIT DIVISION, %UDIV: 0
UNIT DIVISION, %UDIV: 0
WBC # BLD AUTO: 11.6 K/UL (ref 3.6–11)

## 2020-07-28 PROCEDURE — 99218 HC RM OBSERVATION: CPT

## 2020-07-28 PROCEDURE — 96374 THER/PROPH/DIAG INJ IV PUSH: CPT

## 2020-07-28 PROCEDURE — 80048 BASIC METABOLIC PNL TOTAL CA: CPT

## 2020-07-28 PROCEDURE — 36415 COLL VENOUS BLD VENIPUNCTURE: CPT

## 2020-07-28 PROCEDURE — 85027 COMPLETE CBC AUTOMATED: CPT

## 2020-07-28 PROCEDURE — 74011250636 HC RX REV CODE- 250/636: Performed by: OBSTETRICS & GYNECOLOGY

## 2020-07-28 PROCEDURE — 74011250636 HC RX REV CODE- 250/636: Performed by: PHYSICIAN ASSISTANT

## 2020-07-28 PROCEDURE — 74011000250 HC RX REV CODE- 250: Performed by: PHYSICIAN ASSISTANT

## 2020-07-28 PROCEDURE — 74011250637 HC RX REV CODE- 250/637: Performed by: PHYSICIAN ASSISTANT

## 2020-07-28 RX ORDER — POTASSIUM CHLORIDE 750 MG/1
20 TABLET, FILM COATED, EXTENDED RELEASE ORAL 2 TIMES DAILY
Status: DISCONTINUED | OUTPATIENT
Start: 2020-07-28 | End: 2020-07-29

## 2020-07-28 RX ORDER — KETOROLAC TROMETHAMINE 30 MG/ML
15 INJECTION, SOLUTION INTRAMUSCULAR; INTRAVENOUS
Status: DISCONTINUED | OUTPATIENT
Start: 2020-07-28 | End: 2020-07-29

## 2020-07-28 RX ORDER — HYDROMORPHONE HYDROCHLORIDE 1 MG/ML
1 INJECTION, SOLUTION INTRAMUSCULAR; INTRAVENOUS; SUBCUTANEOUS
Status: DISCONTINUED | OUTPATIENT
Start: 2020-07-28 | End: 2020-07-29 | Stop reason: HOSPADM

## 2020-07-28 RX ORDER — ENOXAPARIN SODIUM 100 MG/ML
40 INJECTION SUBCUTANEOUS EVERY 24 HOURS
Status: DISCONTINUED | OUTPATIENT
Start: 2020-07-28 | End: 2020-07-29 | Stop reason: HOSPADM

## 2020-07-28 RX ORDER — KETOROLAC TROMETHAMINE 30 MG/ML
30 INJECTION, SOLUTION INTRAMUSCULAR; INTRAVENOUS
Status: DISCONTINUED | OUTPATIENT
Start: 2020-07-28 | End: 2020-07-28

## 2020-07-28 RX ADMIN — OXYCODONE 10 MG: 5 TABLET ORAL at 02:12

## 2020-07-28 RX ADMIN — KETOROLAC TROMETHAMINE 15 MG: 30 INJECTION, SOLUTION INTRAMUSCULAR at 16:31

## 2020-07-28 RX ADMIN — KETOROLAC TROMETHAMINE 15 MG: 30 INJECTION, SOLUTION INTRAMUSCULAR at 09:19

## 2020-07-28 RX ADMIN — OXYCODONE 5 MG: 5 TABLET ORAL at 14:52

## 2020-07-28 RX ADMIN — SODIUM CHLORIDE, SODIUM LACTATE, POTASSIUM CHLORIDE, AND CALCIUM CHLORIDE 500 ML: 600; 310; 30; 20 INJECTION, SOLUTION INTRAVENOUS at 03:00

## 2020-07-28 RX ADMIN — SODIUM CHLORIDE, SODIUM LACTATE, POTASSIUM CHLORIDE, AND CALCIUM CHLORIDE 125 ML/HR: 600; 310; 30; 20 INJECTION, SOLUTION INTRAVENOUS at 02:49

## 2020-07-28 RX ADMIN — OXYCODONE 5 MG: 5 TABLET ORAL at 22:21

## 2020-07-28 RX ADMIN — POTASSIUM CHLORIDE 20 MEQ: 750 TABLET, FILM COATED, EXTENDED RELEASE ORAL at 18:00

## 2020-07-28 RX ADMIN — ENOXAPARIN SODIUM 40 MG: 40 INJECTION SUBCUTANEOUS at 08:57

## 2020-07-28 RX ADMIN — SODIUM CHLORIDE, SODIUM LACTATE, POTASSIUM CHLORIDE, AND CALCIUM CHLORIDE 125 ML/HR: 600; 310; 30; 20 INJECTION, SOLUTION INTRAVENOUS at 14:54

## 2020-07-28 RX ADMIN — KETOROLAC TROMETHAMINE 30 MG: 30 INJECTION, SOLUTION INTRAMUSCULAR at 03:07

## 2020-07-28 RX ADMIN — DOCUSATE SODIUM 200 MG: 100 CAPSULE, LIQUID FILLED ORAL at 08:57

## 2020-07-28 RX ADMIN — HYDROMORPHONE HYDROCHLORIDE 1 MG: 1 INJECTION, SOLUTION INTRAMUSCULAR; INTRAVENOUS; SUBCUTANEOUS at 03:07

## 2020-07-28 RX ADMIN — POTASSIUM CHLORIDE 20 MEQ: 750 TABLET, FILM COATED, EXTENDED RELEASE ORAL at 08:57

## 2020-07-28 NOTE — PROGRESS NOTES
Problem: Falls - Risk of  Goal: *Absence of Falls  Description: Document Benedict Cardoso Fall Risk and appropriate interventions in the flowsheet.   Outcome: Progressing Towards Goal  Note: Fall Risk Interventions:  Mobility Interventions: Patient to call before getting OOB         Medication Interventions: Patient to call before getting OOB, Teach patient to arise slowly    Elimination Interventions: Call light in reach, Patient to call for help with toileting needs              Problem: Pain - Acute  Goal: *Control of acute pain  Outcome: Progressing Towards Goal

## 2020-07-28 NOTE — PROGRESS NOTES
Gynecologic Oncology - [address], [address], [address] [facility]  P [phone]  F [phone]       Patient: Sumi Vance  Admit Date: 7/27/2020  Admit Dx: Fibroid uterus [D25.9]  DUB (dysfunctional uterine bleeding) [N93.8]    Subjective:     Pain overnight, controlled currently. No N/V, reflux, F/C, SOB.   Bolus x 1    Objective:     Date 07/27/20 0700 - 07/28/20 0659 07/28/20 0700 - 07/29/20 0659   Shift 4242-1860 6863-9733 24 Hour Total 4272-9534 2893-2552 24 Hour Total   INTAKE   I.V.(mL/kg/hr) 2695.8(1.8) 1785.4(1.2) 4481.3(1.5)        I.V. 500  500        Volume (lactated Ringers infusion) 195.8  195.8        Volume (lactated Ringers infusion) 1000  1000        Volume (0.9% sodium chloride infusion) 1000  1000        Volume (lactated Ringers infusion)  1285.4 1285.4        Volume (lactated ringers bolus infusion 500 mL)  500 500      Other 310  310        Other 310  310      Shift Total(mL/kg) 3005.8(23.5) 1785.4(14) 4791.3(37.5)      OUTPUT   Urine(mL/kg/hr) 250(0.2) 475(0.3) 725(0.2)        Urine Output 125  125        Urine Output (mL) (Urinary Catheter 07/27/20 2- way) 125 475 600      Blood 50  50        Estimated Blood Loss 50  50      Shift Total(mL/kg) 300(2.3) 475(3.7) 775(6.1)      NET 2705.8 1310.4 4016.3      Weight (kg) 127.9 127.9 127.9 127.9 127.9 127.9       Physical Exam  /65 (BP 1 Location: Right arm, BP Patient Position: At rest)   Pulse 70   Temp 98 °F (36.7 °C)   Resp 16   Ht 5' 6\" (1.676 m)   Wt 282 lb (127.9 kg)   LMP 07/27/2020   SpO2 100%   BMI 45.52 kg/m²      General:  alert, cooperative, no distress     Cardiac:  Regular rate and rhythm        Lungs:  clear to auscultation bilaterally  Abdomen:  soft, nondistended, hypoactive bowel sounds, tenderness mild - periwound       Wound:  clean, dry, no drainage   Extremity: no edema    Wt Readings from Last 3 Encounters:   07/27/20 282 lb (127.9 kg)   07/13/20 282 lb 6.6 oz (128.1 kg)   07/01/20 281 lb 3.2 oz (127.6 kg)         Data Review      Recent Labs     07/28/20 0213   WBC 11.6*   HGB 7.3*   HCT 25.1*   *     Recent Labs     07/28/20 0213      K 3.2*   *   GLU 94   BUN 9   CREA 0.73   CA 7.9*         Assessment/Plan:     Admitted for Fibroid uterus [D25.9]  DUB (dysfunctional uterine bleeding) [N93.8]    Active Hospital Problems    Diagnosis Date Noted    DUB (dysfunctional uterine bleeding) 07/27/2020    Fibroid uterus 07/27/2020       Shivani Pires 1 Day Post-Op Procedure(s):  DIAGNOSTIC LAPAROSCOPY, RESECTION OF MASS, TOTAL LAPAROSCOPIC HYSTERECTOMY, BILATERAL SALPINGECTOMY, MINI LAPAROTOMY for PELVIC MASS    Onc: fibroid uterus. No invasive neoplastic. Heme/CV: Hemodynamically stable  Renal: DC bustillos. Indices normal  FEN/GI: diet as tolerated. supp K+  ID/Wound: afeb, abd benign postop  Neuro: PO analgesia, toradol  PPX: ambulate, lovenox, IS  Disposition: Doing well postoperatively. Continue routine care. Nallely Carrera PA-C      Attending Attestation    I have seen and examined the above patient and agree with the care provider's assessment and plan. Doing well. Continue RPOC. Bustillos to be removed, await voiding trial. Encourage ambulation and IS.      Viktor Ortiz MD

## 2020-07-28 NOTE — PROGRESS NOTES
Bedside and Verbal shift change report given to Radha Jerez RN (oncoming nurse) by JOSH Sood RN (offgoing nurse). Report included the following information SBAR, Kardex, Intake/Output, MAR and Recent Results. Pt assisted out bed to chair this am, tolerated. @15:35 Pt does not feel the desire to void. @15:58 SAM BONILLA  Called to straight cath pt if output is 250 cc and above leave apollo,

## 2020-07-28 NOTE — PROGRESS NOTES
Bedside shift change report given to [address] (oncoming nurse) by Rene Cruz RN (offgoing nurse). Report included SBAR, Kardex, Intake/Output, MAR, Recent Results and Progress of Care. I accept this patient to my care at this time. Any subsequent documentation in this Progress Note is intended to be information adjunct to other components of the patient's electronic medical record. Refer to accompanying timestamp(s) for chronology. 2:48 AM: Pt is tearful and in significant pain. Roxicodone has not adequately controlled her pain by itself. MD Freitas on-call paged at this time. He gives telephone order to increased PRN hydromorphone IV to 1mg q2h prn and to add 30mg ketorolac IV q6h prn. Pt also with only 375mL urine out over 12 hours post op (31.25mL/hr). MD Nadeem Forman made aware and gives verbal order for 500mL bolus of LR now and continue maintenance at 125mL/hr.    3:37 AM:  On reevaluation, pt reports feeling much better. She appears more comfortable.  Will defer ambulation at this time so patient may rest, She is no longer tearful

## 2020-07-28 NOTE — PROGRESS NOTES
1400- Patient ambulated in hallway, 2 laps completed, patient stable, mild pain. 1420- patient back in room, sitting up in chair, stable, no other complaints at this time    1620- helped patient up to the bathroom, voided 300cc    1640- Bedside and Verbal shift change report given to [address] (oncoming nurse) by Zunilda Huff RN (offgoing nurse). Report included the following information SBAR, Kardex, OR Summary, Procedure Summary, Intake/Output and MAR.

## 2020-07-29 VITALS
TEMPERATURE: 98.6 F | HEIGHT: 66 IN | BODY MASS INDEX: 45.32 KG/M2 | OXYGEN SATURATION: 99 % | DIASTOLIC BLOOD PRESSURE: 70 MMHG | RESPIRATION RATE: 16 BRPM | HEART RATE: 71 BPM | WEIGHT: 282 LBS | SYSTOLIC BLOOD PRESSURE: 149 MMHG

## 2020-07-29 LAB
ANION GAP SERPL CALC-SCNC: 5 MMOL/L (ref 5–15)
BUN SERPL-MCNC: 9 MG/DL (ref 6–20)
BUN/CREAT SERPL: 14 (ref 12–20)
CALCIUM SERPL-MCNC: 7.5 MG/DL (ref 8.5–10.1)
CHLORIDE SERPL-SCNC: 110 MMOL/L (ref 97–108)
CO2 SERPL-SCNC: 24 MMOL/L (ref 21–32)
CREAT SERPL-MCNC: 0.66 MG/DL (ref 0.55–1.02)
ERYTHROCYTE [DISTWIDTH] IN BLOOD BY AUTOMATED COUNT: 25.8 % (ref 11.5–14.5)
GLUCOSE SERPL-MCNC: 90 MG/DL (ref 65–100)
HCT VFR BLD AUTO: 23.3 % (ref 35–47)
HGB BLD-MCNC: 7 G/DL (ref 11.5–16)
MAGNESIUM SERPL-MCNC: 2 MG/DL (ref 1.6–2.4)
MCH RBC QN AUTO: 18.5 PG (ref 26–34)
MCHC RBC AUTO-ENTMCNC: 30 G/DL (ref 30–36.5)
MCV RBC AUTO: 61.5 FL (ref 80–99)
NRBC # BLD: 0 K/UL (ref 0–0.01)
NRBC BLD-RTO: 0 PER 100 WBC
PLATELET # BLD AUTO: 386 K/UL (ref 150–400)
PMV BLD AUTO: 8.9 FL (ref 8.9–12.9)
POTASSIUM SERPL-SCNC: 3.1 MMOL/L (ref 3.5–5.1)
RBC # BLD AUTO: 3.79 M/UL (ref 3.8–5.2)
SODIUM SERPL-SCNC: 139 MMOL/L (ref 136–145)
WBC # BLD AUTO: 8.8 K/UL (ref 3.6–11)

## 2020-07-29 PROCEDURE — 83735 ASSAY OF MAGNESIUM: CPT

## 2020-07-29 PROCEDURE — 96376 TX/PRO/DX INJ SAME DRUG ADON: CPT

## 2020-07-29 PROCEDURE — 80048 BASIC METABOLIC PNL TOTAL CA: CPT

## 2020-07-29 PROCEDURE — 85027 COMPLETE CBC AUTOMATED: CPT

## 2020-07-29 PROCEDURE — 74011250637 HC RX REV CODE- 250/637: Performed by: PHYSICIAN ASSISTANT

## 2020-07-29 PROCEDURE — 96372 THER/PROPH/DIAG INJ SC/IM: CPT

## 2020-07-29 PROCEDURE — 99218 HC RM OBSERVATION: CPT

## 2020-07-29 PROCEDURE — 36415 COLL VENOUS BLD VENIPUNCTURE: CPT

## 2020-07-29 PROCEDURE — 74011250636 HC RX REV CODE- 250/636: Performed by: PHYSICIAN ASSISTANT

## 2020-07-29 RX ORDER — POTASSIUM CHLORIDE 750 MG/1
40 TABLET, FILM COATED, EXTENDED RELEASE ORAL 2 TIMES DAILY
Status: DISCONTINUED | OUTPATIENT
Start: 2020-07-29 | End: 2020-07-29 | Stop reason: HOSPADM

## 2020-07-29 RX ORDER — POLYETHYLENE GLYCOL 3350 17 G/17G
17 POWDER, FOR SOLUTION ORAL DAILY
Qty: 238 G | Refills: 1 | Status: SHIPPED | OUTPATIENT
Start: 2020-07-29 | End: 2020-09-14

## 2020-07-29 RX ORDER — IBUPROFEN 600 MG/1
600 TABLET ORAL EVERY 6 HOURS
Qty: 16 TAB | Refills: 0 | Status: SHIPPED | OUTPATIENT
Start: 2020-07-29 | End: 2020-08-02

## 2020-07-29 RX ORDER — OXYCODONE HYDROCHLORIDE 5 MG/1
5 TABLET ORAL
Qty: 30 TAB | Refills: 0 | Status: SHIPPED | OUTPATIENT
Start: 2020-07-29 | End: 2020-08-03

## 2020-07-29 RX ORDER — IBUPROFEN 600 MG/1
600 TABLET ORAL 3 TIMES DAILY
Status: DISCONTINUED | OUTPATIENT
Start: 2020-07-29 | End: 2020-07-29 | Stop reason: HOSPADM

## 2020-07-29 RX ORDER — OXYCODONE HYDROCHLORIDE 5 MG/1
5-10 TABLET ORAL
Status: DISCONTINUED | OUTPATIENT
Start: 2020-07-29 | End: 2020-07-29 | Stop reason: HOSPADM

## 2020-07-29 RX ORDER — POTASSIUM CHLORIDE 20 MEQ/1
40 TABLET, EXTENDED RELEASE ORAL 2 TIMES DAILY
Qty: 20 TAB | Refills: 0 | Status: SHIPPED | OUTPATIENT
Start: 2020-07-29 | End: 2020-08-03

## 2020-07-29 RX ORDER — POLYETHYLENE GLYCOL 3350 17 G/17G
17 POWDER, FOR SOLUTION ORAL DAILY
Status: DISCONTINUED | OUTPATIENT
Start: 2020-07-29 | End: 2020-07-29 | Stop reason: HOSPADM

## 2020-07-29 RX ADMIN — IBUPROFEN 600 MG: 600 TABLET, FILM COATED ORAL at 08:58

## 2020-07-29 RX ADMIN — DOCUSATE SODIUM 200 MG: 100 CAPSULE, LIQUID FILLED ORAL at 09:00

## 2020-07-29 RX ADMIN — KETOROLAC TROMETHAMINE 15 MG: 30 INJECTION, SOLUTION INTRAMUSCULAR at 03:04

## 2020-07-29 RX ADMIN — ENOXAPARIN SODIUM 40 MG: 40 INJECTION SUBCUTANEOUS at 08:58

## 2020-07-29 RX ADMIN — OXYCODONE 5 MG: 5 TABLET ORAL at 09:14

## 2020-07-29 RX ADMIN — POTASSIUM CHLORIDE 40 MEQ: 750 TABLET, FILM COATED, EXTENDED RELEASE ORAL at 08:58

## 2020-07-29 NOTE — PROGRESS NOTES
Bedside and Verbal shift change report given to GERMAN Higginbotham RN (oncoming nurse) by Tello Gutierrez RN (offgoing nurse). Report included the following information SBAR, Kardex, Procedure Summary, Intake/Output, MAR, Accordion and Recent Results.

## 2020-07-29 NOTE — DISCHARGE INSTRUCTIONS
[address] [facility] [address], [address]  P [phone]  F [phone]     Krzysztof Oviedo      Dear Ms. Angela Golden,      Please review your instructions with your nurse and ask any questions so you have all the information you need to recover well at home. If you do not feel you have everything you need to succeed and be safe after you leave the hospital, please discuss these concerns with your nurse. As always, call for any questions at home. Your doctor: Dr. Robert Figueredo  Diagnosis: Fibroid uterus [D25.9]  DUB (dysfunctional uterine bleeding) [N93.8]  Procedure: Procedure(s):  DIAGNOSTIC LAPAROSCOPY, RESECTION OF MASS, TOTAL LAPAROSCOPIC HYSTERECTOMY, BILATERAL SALPINGECTOMY, MINI LAPAROTOMY  Date of Discharge: 7/29/2020      Take Home Medications     See Discharge Medication Review provided to you by your nurse. If you did not receive one, request this prior to your discharge. · It is important that you take your medications as they are prescribed. · Keep your medications in the bottles provided by the pharmacist and keep a list of the medication names, dosages, times to be taken and what they are for in your wallet. · Do not take other medications without consulting your doctor. · If you no longer need your prescribed pain medication, you may take Motrin or Advil alone. · You should take a daily stool softener as prescribed for constipation as this is not uncommon after surgery and/or while on pain medication. If you have not had a bowel movement within 2 days of your discharge, you should use a suppository which can be found over the counter at your pharmacy and/or use a dose of Milk of Magnesia. Call if your constipation continues. Diet    · Stay hydrated and drink fluids such as gatorade and water. This will also help prevent constipation and dehydration.  Limit somewhat any usual caffeine intake of beverages such as soda, tea and coffee as this may serve to dehydrate you. · For the first 2-3 days keep a low fiber diet avoiding raw vegetables or fruits with skin. A diet consisting of soup, cereal, yogurt, eggs, fish, Boost/Ensure. Avoid fatty/greasy foods. · If nauseated, keep your diet limited to liquids and call if this persists. Activity    · If possible, have someone with you at all times until you feel stable. · Gradually increase your activity each day. There are generally no restrictions on walking, climbing stairs or riding in a car. Try to walk at least 4 times per day. · Showers are okay. If you have an incision, no tub bathing/swimming for two weeks. Walking will help reduce the risk of blood clots and constipation. · No driving for 2 week and/or while on pain medication. · The following restrictions apply:  1. No heavy lifting greater than 15 lbs for 8 weeks. 2. Nothing per vagina for 8 weeks. 3. You may experience vaginal spotting. Should the bleeding require more that 4 pads a day please call our office. Incision    · You should expect some discomfort in the area of your incision, particularly as you increase your activity. If you notice an area of increasing redness or new drainage, please call your doctor. · Your incisions will have buried, absorbable sutures, which do not need to be removed and are covered by protective glue. This will come off over time. · Do not pick at incisions. Pat dry and allow them to come off over time. Causes For Concern    If any of the following occur, please call our office and speak with the Nurse/aid who will help you with your problem or ask the doctor to call you.  Problems with the incision, including increasing pain, swelling, redness or drainage.  Inability to pass urine    Increasing abdominal pain despite medication   Persisting nausea or vomiting.     Fever or chills and a temperature >101F   Constipation (no bowel movement for three days).    Diarrhea (more than three watery stools within 24 hours).  Excessive vaginal or wound bleeding.  If after hours and you cannot reach an on-call physician, call 911. Follow-Up    Call [phone] to schedule an appointment with Dr. Bakari De Leon in 6 weeks. Information obtained by :  I understand that if any problems occur once I am at home I am to contact my physician. I understand and acknowledge receipt of the instructions indicated above.                                                                                                                                            Physician's or R.N.'s Signature                                                                  Date/Time                                                                                                                                              Patient or Representative Signature                                                          Date/Time

## 2020-07-29 NOTE — PROGRESS NOTES
Gynecologic Oncology - [address], [address], [address] [facility]  P [phone]  F [phone]       Patient: Hafsa Dao  Admit Date: 7/27/2020  Admit Dx: Fibroid uterus [D25.9]  DUB (dysfunctional uterine bleeding) [N93.8]    Subjective:     Pain better controlled. Still fear of pain with activity. Tolerating diet. No N/V, reflux, F/C, SOB. Objective:     Date 07/28/20 0700 - 07/29/20 0659 07/29/20 0700 - 07/30/20 0659   Shift 0930-2605 0593-9641 24 Hour Total 7510-0942 0859-7839 24 Hour Total   INTAKE   P.O. 800  800        P. O. 800  800      I. V.(mL/kg/hr) 1429.2(0.9) 479.2(0.3) 1908.3(0.6)        Volume (lactated Ringers infusion) 1429.2 479.2 1908. 3      Shift Total(mL/kg) 2229. 2(17.4) 479.2(3.7) 0721.1(27.3)      OUTPUT   Urine(mL/kg/hr) 500(0.3) 900(0.6) 1400(0.5)        Urine Voided         Urine Output (mL) ([REMOVED] Urinary Catheter 07/27/20 2- way) 200  200      Shift Total(mL/kg) 500(3.9) 900(7) 1400(10.9)      NET 1729.2 -420.8 1308. 3      Weight (kg) 127.9 127.9 127.9 127.9 127.9 127.9       Physical Exam  /56 (BP 1 Location: Right arm, BP Patient Position: At rest)   Pulse 85   Temp 98.5 °F (36.9 °C)   Resp 16   Ht 5' 6\" (1.676 m)   Wt 282 lb (127.9 kg)   LMP 07/27/2020   SpO2 98%   BMI 45.52 kg/m²      General:  alert, cooperative, no distress     Cardiac:  Regular rate and rhythm        Lungs:  nonlabored  Abdomen:  soft, nondistended, hypoactive bowel sounds, tenderness mild - periwound       Wound:  clean, dry, no drainage   Extremity: no edema    Wt Readings from Last 3 Encounters:   07/27/20 282 lb (127.9 kg)   07/13/20 282 lb 6.6 oz (128.1 kg)   07/01/20 281 lb 3.2 oz (127.6 kg)         Data Review      Recent Labs     07/29/20  0551 07/28/20  0213   WBC 8.8 11.6*   HGB 7.0* 7.3*   HCT 23.3* 25.1*    417*     Recent Labs     07/29/20  0551 07/28/20  0213    139   K 3.1* 3.2*   * 109*   GLU 90 94   BUN 9 9   CREA 0.66 0.73   CA 7.5* 7.9*   MG 2.0  --          Assessment/Plan:     Admitted for Fibroid uterus [D25.9]  DUB (dysfunctional uterine bleeding) [N93.8]    Active Hospital Problems    Diagnosis Date Noted    DUB (dysfunctional uterine bleeding) 07/27/2020    Fibroid uterus 07/27/2020       Shivani Pires 2 Day Post-Op Procedure(s):  DIAGNOSTIC LAPAROSCOPY, RESECTION OF MASS, TOTAL LAPAROSCOPIC HYSTERECTOMY, BILATERAL SALPINGECTOMY, MINI LAPAROTOMY for PELVIC MASS    Onc: fibroid uterus. No invasive neoplastic disease    Heme/CV: Hemodynamically stable. DUB/fibroids, Fe def anemia, chronic preop. S/p 1 unit PRBCs intraop. Stable postop. Renal: Indices normal  FEN/GI: diet as tolerated. supp K+. Stool softener. ID/Wound: afeb, abd benign postop  Neuro: PO analgesia, discussed fear of pain and med use  PPX: ambulate, lovenox, IS  Disposition: Doing well postoperatively. Continue routine care. Likely home today. Mother assisting in postop care at home. Zaira Monroe PA-C    Attending Attestation    I have seen and examined the above patient and agree with the care provider's assessment and plan. Doing well. Meeting goals for discharge. Follow-up in 6 weeks for postoperative visit.      Gavino Peñaloza MD

## 2020-07-29 NOTE — PROGRESS NOTES
Bedside and Verbal shift change report given to [address] (oncoming nurse) by Krystal Mcdowell (offgoing nurse). Report included the following information SBAR, Kardex, Procedure Summary, Intake/Output and MAR. I have reviewed discharge instructions with the patient. The patient verbalized understanding. Discharge medications reviewed with patient and appropriate educational materials and side effects teaching were provided.     Removed patients Aquacel bandage before DC

## 2020-07-30 ENCOUNTER — PATIENT OUTREACH (OUTPATIENT)
Dept: INTERNAL MEDICINE CLINIC | Age: 35
End: 2020-07-30

## 2020-07-30 NOTE — PROGRESS NOTES
COVID Concern: IP Dysfunctional Uterine Bleeding/Total Hysterectomy  Patient contacted regarding recent discharge and COVID-19 risk. Discussed COVID-19 related testing which was not done at this time. Test results were not done. Patient informed of results, if available? no    Care Transition Nurse/ Ambulatory Care Manager contacted the patient by telephone to perform LINDA post discharge assessment. Verified name and  with patient as identifiers. Patient has following risk factors of: Sickle Cell Disease . CTN/ACM reviewed discharge instructions, medical action plan and red flags related to discharge diagnosis. Reviewed and educated them on any new and changed medications related to discharge diagnosis. Advised obtaining a 90-day supply of all daily and as-needed medications. Advance Care Planning:   Does patient have an Advance Directive: not on file; education provided   Healthcare Decision Maker remains:  Nano Pires-relative    Education provided regarding infection prevention, and signs and symptoms of COVID-19 and when to seek medical attention with patient who verbalized understanding. Discussed exposure protocols and quarantine from [address] you at higher risk for severe illness  and given an opportunity for questions and concerns. The patient agrees to contact the COVID-19 hotline [phone] or PCP office for questions related to their healthcare. CTN/ACM provided contact information for future reference. From CDC: Are you at higher risk for severe illness?  Wash your hands often.  Avoid close contact (6 feet, which is about two arm lengths) with people who are sick.  Put distance between yourself and other people if COVID-19 is spreading in your community.  Clean and disinfect frequently touched surfaces.  Avoid all cruise travel and non-essential air travel.    Call your healthcare professional if you have concerns about COVID-19 and your underlying condition or if you are sick. For more information on steps you can take to protect yourself, see CDC's How to Protect Yourself      Patient/family/caregiver given information for GetWell Loop and agrees to enroll no    Followup in 7 days based on severity of symptoms and risk factors.

## 2020-08-04 RX ORDER — FLUCONAZOLE 150 MG/1
150 TABLET ORAL DAILY
Qty: 1 TAB | Refills: 0 | Status: SHIPPED | OUTPATIENT
Start: 2020-08-04 | End: 2020-08-05

## 2020-08-04 NOTE — TELEPHONE ENCOUNTER
Pt report vaginal discharge and some vaginal itching, she states no fever, no chills, pain has improved, will send 150 diflucan X1 to omer on file per verbal order of Dr. Jeferson Mckeon

## 2020-08-17 ENCOUNTER — TELEPHONE (OUTPATIENT)
Dept: GYNECOLOGY | Age: 35
End: 2020-08-17

## 2020-08-17 NOTE — TELEPHONE ENCOUNTER
Patient called c/o a brownish discharge with a very \" strong bad smelling odor \". I advised that it could be from old blood. She states it is very noticeable and she can smell  It just sitting. It started about a week ago and she denies any abnormal pain, fever, chills or irritation. Please advise.

## 2020-08-18 NOTE — TELEPHONE ENCOUNTER
Called the patient, left a message that  recommends she come in sometime this week to evaluate and if resolves then keep her normal scheduled follow up.

## 2020-08-19 ENCOUNTER — OFFICE VISIT (OUTPATIENT)
Dept: GYNECOLOGY | Age: 35
End: 2020-08-19
Payer: MEDICAID

## 2020-08-19 ENCOUNTER — PATIENT OUTREACH (OUTPATIENT)
Dept: CASE MANAGEMENT | Age: 35
End: 2020-08-19

## 2020-08-19 VITALS
HEIGHT: 66 IN | SYSTOLIC BLOOD PRESSURE: 121 MMHG | HEART RATE: 89 BPM | WEIGHT: 264.2 LBS | TEMPERATURE: 97.3 F | BODY MASS INDEX: 42.46 KG/M2 | DIASTOLIC BLOOD PRESSURE: 91 MMHG

## 2020-08-19 DIAGNOSIS — D25.9 UTERINE LEIOMYOMA, UNSPECIFIED LOCATION: Primary | ICD-10-CM

## 2020-08-19 DIAGNOSIS — R18.8 PELVIC FLUID COLLECTION: ICD-10-CM

## 2020-08-19 DIAGNOSIS — N93.8 DUB (DYSFUNCTIONAL UTERINE BLEEDING): ICD-10-CM

## 2020-08-19 PROCEDURE — 99024 POSTOP FOLLOW-UP VISIT: CPT | Performed by: OBSTETRICS & GYNECOLOGY

## 2020-08-19 RX ORDER — AMOXICILLIN AND CLAVULANATE POTASSIUM 875; 125 MG/1; MG/1
1 TABLET, FILM COATED ORAL EVERY 12 HOURS
Qty: 14 TAB | Refills: 0 | Status: SHIPPED | OUTPATIENT
Start: 2020-08-19 | End: 2020-08-26

## 2020-08-19 NOTE — PROGRESS NOTES
27 [facility] [address], [address]  P [phone]  F [phone]    Office Note  Patient ID:  Name:  Marlon Andres  MRN:  [id number]  :  1985/35 y.o. Date:  2020      HISTORY OF PRESENT ILLNESS:  Ms. Marlon Andres is a 38 y.o. female who on 2020 underwent Diagnostic laparoscopy, resection of mass, total laparoscopic hysterectomy with bilateral salpingectomy, mini-laparotomy. Final pathology consistent with benign uterine fibroids. Presents today as a problem visit with reports of vaginal discharge that is foul-smelling for the last couple of days. Denies fevers or chills. Reports normal postop pain. Denies nausea or vomiting. Reports normal bowel movements. Denies vaginal bleeding. Initial Inpatient History:  Marlon Andres is a 38 y.o.  female being seen for fibroid uterus, right hydronephrosis and hx of vaginal bleeding, anemia. She presents to [facility] from home d/t right sided abd/flank pain since last Tuesday, improved but worsened . Using APAP for relief. No hematuria/dysuria. +urgency. CT here demonstrates right hydro, pyelo and heterogenous, fibroid uterus. Admits F/C, no N/V, SOB. Also states +hematochezia Saturday. Bright red. Admit hx of hemorrhoids. This occurs ~3x/yr. Denies vaginal bleeding/discharge since end of menstrual cycle ~9 days ago. Periods last ~11 days, very heavy soaking through tampons/pads at times q2hr for 3 days. A0. Csection x 2 , . She states she was told of fibroids with her second Csection with Dr. Deana Kowalski. She was seen at [address] [place] [address] in 2019 forn right pelvic/abd pain. CT at that time showed similar picture with fibroid uterus and right hydro. She states the following month she was admitted to [facility] for kidney infection. She was told to f/u with her OBGYN, but did not see anyone until 2019 at [facility].  There she states US showed fibroids and was told \"it was not that serious. \"  She did not f/u. She has had an Implanon in situ for at least 5 yrs she thinks. She was last seen by GYN at [facility], states PAP not performed. Admits to HPV+ pap 5 yrs ago w/o followup. Lives with , 2 children. She admits she has completed child bearing. She is a dental assistant, only recently returned to work. She is uncertain if she has any insurance. +tob use 2-3cig/day. Hx of asthma w/o exacerbation. She is sickle cell trait+, no hx VTE. Pertinent PMH/PSH: obesity, sickle cell trait, multiple UTIs c/b pyelonephritis     Active, no restrictions. Pathology Review:   7/27/2020:  FINAL PATHOLOGIC DIAGNOSIS   Uterus and cervix (1356 g), fallopian tubes, hysterectomy, bilateral salpingectomy:   Cervix: No diagnostic pathologic abnormality   Myometrium: Leiomyomas (largest dimension 11.0 cm)   Endometrium: Inactive   Fallopian tubes: No diagnostic pathologic abnormality   Serosa: No diagnostic pathologic abnormality     Imaging Review:   CT A/P 6/9/2020  FINDINGS:   LOWER THORAX: No significant abnormality in the incidentally imaged lower chest.  LIVER: No mass. BILIARY TREE: Gallbladder is within normal limits. CBD is not dilated. SPLEEN: within normal limits. PANCREAS: No mass or ductal dilatation. ADRENALS: Unremarkable. KIDNEYS: Right kidney is smaller than the right. There are areas of scarring. There are low-density areas in the cortex of the right kidney. There is right  hydroureteronephrosis which may be followed to the level of the enlarged uterus  similar to 1/29/2019. STOMACH: Unremarkable. SMALL BOWEL: No dilatation or wall thickening. COLON: No dilatation or wall thickening. APPENDIX: Unremarkable. PERITONEUM: No ascites or pneumoperitoneum. RETROPERITONEUM: No lymphadenopathy or aortic aneurysm. REPRODUCTIVE ORGANS: The uterus is enlarged measuring 19.3 x 12.4 x 9.3 cm.   There is a low-density mass again noted in the right uterine fundus measuring  10.7 x 8.6 cm which is compressing the right ureter. Small cysts are noted  within the ovaries similar to the prior study. URINARY BLADDER: No mass or calculus. BONES: No destructive bone lesion. ABDOMINAL WALL: There is a small umbilical hernia containing fat. ADDITIONAL COMMENTS: N/A  IMPRESSION  IMPRESSION:  1. Right kidney is smaller than the left and there are areas of scarring within  the right kidney. There there are also low-density areas in the right renal  cortex which may represent right pyelonephritis. . There is right  hydroureteronephrosis similar to the prior study with the right ureter being  compressed by enlarged leiomyomatous uterus. 2. The uterus measures 19.3 x 12.4 x 9.3 cm. There are mass lesions within the  uterus with the largest in the right uterine fundus measuring 10.7 x 8.6 cm  which is compressing the right ureter. ROS:  A comprehensive review of systems was negative except for that written in the History of Present Illness. , 10 point ROS    OB/GYN ROS:  Patient denies significant menstrual problems.     ECOG ndGndrndanddndend:nd nd2nd Problem List:  Patient Active Problem List    Diagnosis Date Noted    DUB (dysfunctional uterine bleeding) 2020    Fibroid uterus 2020    Menorrhagia with regular cycle 2020    Iron deficiency anemia 06/10/2020    Low folate 06/10/2020    Leukocytosis 2020    Pyelonephritis, acute 2020    Anemia 2020    Uterine mass 2020    Sickle cell trait ([address] 2010    Obesity, morbid ([address] 2010    Leiomyoma 2010     PMH:  Past Medical History:   Diagnosis Date    Asthma      delivery 2003    GERD (gastroesophageal reflux disease)     no medication    Morbid obesity ([address]     Sickle cell trait ([address]     Sickle cell trait ([address]       PSH:  Past Surgical History:   Procedure Laterality Date    HX  SECTION      HX  SECTION      HX HEENT      TEETH EXTRACTION      Social History:  Social History     Tobacco Use    Smoking status: Current Every Day Smoker     Packs/day: 0.25     Years: 4.00     Pack years: 1.00    Smokeless tobacco: Never Used   Substance Use Topics    Alcohol use: Yes     Comment: RARELY- DRINK WINE      Family History:  Family History   Problem Relation Age of Onset    Diabetes Mother     Hypertension Father     No Known Problems Sister     Hypertension Maternal Grandmother     No Known Problems Sister     Asthma Son     Asthma Daughter     Anesth Problems Neg Hx       Medications: (reviewed)  Current Outpatient Medications   Medication Sig    amoxicillin-clavulanate (AUGMENTIN) 875-125 mg per tablet Take 1 Tab by mouth every twelve (12) hours for 7 days.  folic acid (FOLVITE) 1 mg tablet Take 1 Tab by mouth daily.  polyethylene glycol (MIRALAX) 17 gram/dose powder Take 17 g by mouth daily. Stop if loose stools    PNV No12-Iron-FA-DSS-OM-3 29 mg iron-1 mg -50 mg CPKD Take 1 Tab by mouth every morning.  ferrous sulfate 325 mg (65 mg iron) tablet Take 1 Tab by mouth two (2) times daily (with meals).  ondansetron (Zofran ODT) 4 mg disintegrating tablet Take 1 Tab by mouth every eight (8) hours as needed for Nausea or Vomiting. No current facility-administered medications for this visit. Allergies: (reviewed)  Allergies   Allergen Reactions    Tylenol [Acetaminophen] Itching          OBJECTIVE:    Physical Exam:  Visit Vitals  BP (!) 121/91 (BP 1 Location: Left arm, BP Patient Position: Sitting)   Pulse 89   Temp 97.3 °F (36.3 °C) (Temporal)   Ht 5' 6\" (1.676 m)   Wt 264 lb 3.2 oz (119.8 kg)   LMP 07/27/2020   BMI 42.64 kg/m²      General: Alert and oriented. No acute distress. Well-nourished  Gastrointestinal: soft, non-tender, non-distended, no masses or organomegaly. Well-healing incisions. Musculoskeletal: normal gait. No joint tenderness, deformity or swelling. No muscular tenderness. Extremities: extremities normal, atraumatic, no cyanosis or edema. Pelvic: exam chaperoned by nurse. Normal appearing external genitalia. On speculum exam, the vagina is normal appearing with some whitish/light-brown discharge with some blood tinge. Vaginal cuff intact without defect. On bimanual, the vaginal cuff is intact without defect. Some fullness at vaginal cuff concerning for possible fluid collection versus hematoma. Seems to be draining spontaneously. Neuro: Grossly intact. Normal gait and movement. No acute deficit  Skin: No evidence of rashes or skin changes. IMPRESSION/PLAN:    Ms. Bell Ma is a 38 y.o. female who on 7/27/2020 underwent Diagnostic laparoscopy, resection of mass, total laparoscopic hysterectomy with bilateral salpingectomy, mini-laparotomy. Final pathology consistent with benign uterine fibroids. Problems:     Patient Active Problem List    Diagnosis Date Noted    DUB (dysfunctional uterine bleeding) 07/27/2020    Fibroid uterus 07/27/2020    Menorrhagia with regular cycle 06/11/2020    Iron deficiency anemia 06/10/2020    Low folate 06/10/2020    Leukocytosis 06/09/2020    Pyelonephritis, acute 06/09/2020    Anemia 06/09/2020    Uterine mass 06/09/2020    Sickle cell trait ([facility] 06/29/2010    Obesity, morbid ([facility] 06/29/2010    Leiomyoma 06/29/2010     Reviewed patient's course to date including her benign surgical pathology. Overall she is doing well except from some vaginal discharge. On exam I am concerned for possible small fluid collection or hematoma; however this appears to be draining on its own. I have discussed obtaining a CT A/P to evaluate possible fluid drainage and need for a drain, although since it appears to be draining on its own, I suspect it will resolve on its own. Given the foul-smelling nature, I have recommend a 7-day course of Augmentin as hematomas have a predilection to develop an infection.  She is afebrile with normal vitals in clinic. Will obtain CBCD and BMP today. As noted above, she is otherwise doing well. We will follow-up virtually to discuss her CT and lab results. All questions and concerns were addressed with the patient and she is comfortable with the plan.      Sarah Sepulveda MD

## 2020-08-19 NOTE — PROGRESS NOTES
IP [facility] 7//2020:  Dysfunctional Uterine Bleeding/Total Hysterectomy f/u     Patient resolved from Transition of Care episode on 8/18/2020. Patient at f/u appointment at present. No complaints. Discussed COVID-19 related testing which was not done at this time. Test results were not done. Patient informed of results, if available? no     Patient/family has been provided the following resources and education related to COVID-19:                         Signs, symptoms and red flags related to COVID-19            CDC exposure and quarantine guidelines            [employer] exposure contact - [phone]            Contact for their local Department of Health                 Patient currently reports that the following symptoms have improved:  no worsening symptoms. No further outreach scheduled with this CTN/ACM/LPN/HC/ MA. Episode of Care resolved. Patient has this CTN/ACM/LPN/HC/MA contact information if future needs arise.

## 2020-08-19 NOTE — PROGRESS NOTES
Problem visit, pt had surgery on 7/27/2020, complains of brown/blackish discharge with a foul odor, \"creamy\" in texture, no nausea or vomiting, no fever or chills    Vitals:    08/19/20 1418 08/19/20 1424   BP: (!) 136/100 (!) 121/91   BP 1 Location: Left arm Left arm   BP Patient Position: Sitting Sitting   Pulse: 92 89   Temp: 97.3 °F (36.3 °C)    TempSrc: Temporal    Weight: 264 lb 3.2 oz (119.8 kg)    Height: 5' 6\" (1.676 m)          1. Have you been to the ER, urgent care clinic since your last visit? Hospitalized since your last visit? Yes, pt had surgery with Dr. Katie Cisse on 7/27/2020    2. Have you seen or consulted any other health care providers outside of the [address] [place] [address] since your last visit? Include any pap smears or colon screening.    no

## 2020-08-20 ENCOUNTER — HOSPITAL ENCOUNTER (OUTPATIENT)
Dept: CT IMAGING | Age: 35
Discharge: HOME | End: 2020-08-20
Attending: OBSTETRICS & GYNECOLOGY
Payer: MEDICAID

## 2020-08-20 DIAGNOSIS — D25.9 UTERINE LEIOMYOMA, UNSPECIFIED LOCATION: ICD-10-CM

## 2020-08-20 DIAGNOSIS — R18.8 PELVIC FLUID COLLECTION: ICD-10-CM

## 2020-08-20 LAB
BASOPHILS # BLD AUTO: 0.1 X10E3/UL (ref 0–0.2)
BASOPHILS NFR BLD AUTO: 1 %
BUN SERPL-MCNC: 7 MG/DL (ref 6–20)
BUN/CREAT SERPL: 9 (ref 9–23)
CALCIUM SERPL-MCNC: 9.4 MG/DL (ref 8.7–10.2)
CHLORIDE SERPL-SCNC: 103 MMOL/L (ref 96–106)
CO2 SERPL-SCNC: 21 MMOL/L (ref 20–29)
CREAT SERPL-MCNC: 0.82 MG/DL (ref 0.57–1)
EOSINOPHIL # BLD AUTO: 0.2 X10E3/UL (ref 0–0.4)
EOSINOPHIL NFR BLD AUTO: 3 %
ERYTHROCYTE [DISTWIDTH] IN BLOOD BY AUTOMATED COUNT: 24.3 % (ref 11.7–15.4)
GLUCOSE SERPL-MCNC: 90 MG/DL (ref 65–99)
HCT VFR BLD AUTO: 30.8 % (ref 34–46.6)
HGB BLD-MCNC: 9.1 G/DL (ref 11.1–15.9)
IMM GRANULOCYTES # BLD AUTO: 0 X10E3/UL (ref 0–0.1)
IMM GRANULOCYTES NFR BLD AUTO: 0 %
LYMPHOCYTES # BLD AUTO: 1.5 X10E3/UL (ref 0.7–3.1)
LYMPHOCYTES NFR BLD AUTO: 23 %
MCH RBC QN AUTO: 18.4 PG (ref 26.6–33)
MCHC RBC AUTO-ENTMCNC: 29.5 G/DL (ref 31.5–35.7)
MCV RBC AUTO: 62 FL (ref 79–97)
MONOCYTES # BLD AUTO: 0.8 X10E3/UL (ref 0.1–0.9)
MONOCYTES NFR BLD AUTO: 12 %
MORPHOLOGY BLD-IMP: ABNORMAL
NEUTROPHILS # BLD AUTO: 4.1 X10E3/UL (ref 1.4–7)
NEUTROPHILS NFR BLD AUTO: 61 %
PLATELET # BLD AUTO: 707 X10E3/UL (ref 150–450)
POTASSIUM SERPL-SCNC: 4.6 MMOL/L (ref 3.5–5.2)
RBC # BLD AUTO: 4.94 X10E6/UL (ref 3.77–5.28)
SODIUM SERPL-SCNC: 139 MMOL/L (ref 134–144)
WBC # BLD AUTO: 6.7 X10E3/UL (ref 3.4–10.8)

## 2020-08-20 PROCEDURE — 74011000636 HC RX REV CODE- 636: Performed by: OBSTETRICS & GYNECOLOGY

## 2020-08-20 PROCEDURE — 74177 CT ABD & PELVIS W/CONTRAST: CPT

## 2020-08-20 PROCEDURE — 74011000258 HC RX REV CODE- 258: Performed by: OBSTETRICS & GYNECOLOGY

## 2020-08-20 RX ORDER — SODIUM CHLORIDE 0.9 % (FLUSH) 0.9 %
10 SYRINGE (ML) INJECTION
Status: COMPLETED | OUTPATIENT
Start: 2020-08-20 | End: 2020-08-20

## 2020-08-20 RX ADMIN — Medication 10 ML: at 12:00

## 2020-08-20 RX ADMIN — SODIUM CHLORIDE 100 ML: 900 INJECTION, SOLUTION INTRAVENOUS at 12:00

## 2020-08-20 RX ADMIN — IOPAMIDOL 100 ML: 755 INJECTION, SOLUTION INTRAVENOUS at 12:00

## 2020-08-21 ENCOUNTER — TELEPHONE (OUTPATIENT)
Dept: GYNECOLOGY | Age: 35
End: 2020-08-21

## 2020-08-21 NOTE — TELEPHONE ENCOUNTER
I called Ms. Ofelia Strickland to discuss her normal CBCD, CMP, and CT A/P. Suspect she may have had a pelvic fluid collection at the vaginal cuff that has now spontaneously drained. She reports that the vaginal discharge is slightly better. Continues to deny fevers or chills. We will see her back at her postoperative visit. Given infection precautions. The patient was thankful for our call.      Sheng Mulligan MD

## 2020-09-08 NOTE — PROGRESS NOTES
Post op Visit, surgery was on 7/27/2020, pt complains of back pain 5/10 and abdominal cramping 3/10, pt reports some vaginal spotting    1. Have you been to the ER, urgent care clinic since your last visit? Hospitalized since your last visit?  no    2. Have you seen or consulted any other health care providers outside of the [address] [place] [address] since your last visit? Include any pap smears or colon screening.    no

## 2020-09-09 ENCOUNTER — OFFICE VISIT (OUTPATIENT)
Dept: GYNECOLOGY | Age: 35
End: 2020-09-09
Payer: MEDICAID

## 2020-09-09 VITALS
HEART RATE: 88 BPM | WEIGHT: 260 LBS | DIASTOLIC BLOOD PRESSURE: 83 MMHG | BODY MASS INDEX: 41.78 KG/M2 | HEIGHT: 66 IN | SYSTOLIC BLOOD PRESSURE: 126 MMHG

## 2020-09-09 DIAGNOSIS — Z09 POSTOPERATIVE EXAMINATION: Primary | ICD-10-CM

## 2020-09-09 DIAGNOSIS — D25.9 UTERINE LEIOMYOMA, UNSPECIFIED LOCATION: ICD-10-CM

## 2020-09-09 PROCEDURE — 99024 POSTOP FOLLOW-UP VISIT: CPT | Performed by: OBSTETRICS & GYNECOLOGY

## 2020-09-09 NOTE — PROGRESS NOTES
[facility] [address], [address] [place]  P [phone]  F [phone]    Office Note  Patient ID:  Name:  Torrey Dsouza  MRN:  [id number]  :  1985/35 y.o. Date:  2020      HISTORY OF PRESENT ILLNESS:  Ms. Torrey Dsouza is a 38 y.o. female who on 2020 underwent Diagnostic laparoscopy, resection of mass, total laparoscopic hysterectomy with bilateral salpingectomy, mini-laparotomy. Final pathology consistent with benign uterine fibroids. Presents today for her postoperative visit. Reports the vaginal discharge is improved. Denies fevers or chills. Reports recovering well. Initial Inpatient History:  Torrey Dsouza is a 38 y.o.  female being seen for fibroid uterus, right hydronephrosis and hx of vaginal bleeding, anemia. She presents to [facility] from home d/t right sided abd/flank pain since last Tuesday, improved but worsened . Using APAP for relief. No hematuria/dysuria. +urgency. CT here demonstrates right hydro, pyelo and heterogenous, fibroid uterus. Admits F/C, no N/V, SOB. Also states +hematochezia Saturday. Bright red. Admit hx of hemorrhoids. This occurs ~3x/yr. Denies vaginal bleeding/discharge since end of menstrual cycle ~9 days ago. Periods last ~11 days, very heavy soaking through tampons/pads at times q2hr for 3 days. A0. Csection x 2 2009. She states she was told of fibroids with her second Csection with Dr. Jj Bee. She was seen at [address] [place] [address] in 2019 forn right pelvic/abd pain. CT at that time showed similar picture with fibroid uterus and right hydro. She states the following month she was admitted to [facility] for kidney infection. She was told to f/u with her OBGYN, but did not see anyone until 2019 at [facility]. There she states US showed fibroids and was told \"it was not that serious. \"  She did not f/u.   She has had an Implanon in situ for at least 5 yrs she thinks. She was last seen by GYN at [facility], states PAP not performed. Admits to HPV+ pap 5 yrs ago w/o followup. Lives with , 2 children. She admits she has completed child bearing. She is a dental assistant, only recently returned to work. She is uncertain if she has any insurance. +tob use 2-3cig/day. Hx of asthma w/o exacerbation. She is sickle cell trait+, no hx VTE. Pertinent PMH/PSH: obesity, sickle cell trait, multiple UTIs c/b pyelonephritis     Active, no restrictions. Pathology Review:   7/27/2020:  FINAL PATHOLOGIC DIAGNOSIS   Uterus and cervix (1356 g), fallopian tubes, hysterectomy, bilateral salpingectomy:   Cervix: No diagnostic pathologic abnormality   Myometrium: Leiomyomas (largest dimension 11.0 cm)   Endometrium: Inactive   Fallopian tubes: No diagnostic pathologic abnormality   Serosa: No diagnostic pathologic abnormality     Imaging Review:   CT A/P 6/9/2020  FINDINGS:   LOWER THORAX: No significant abnormality in the incidentally imaged lower chest.  LIVER: No mass. BILIARY TREE: Gallbladder is within normal limits. CBD is not dilated. SPLEEN: within normal limits. PANCREAS: No mass or ductal dilatation. ADRENALS: Unremarkable. KIDNEYS: Right kidney is smaller than the right. There are areas of scarring. There are low-density areas in the cortex of the right kidney. There is right  hydroureteronephrosis which may be followed to the level of the enlarged uterus  similar to 1/29/2019. STOMACH: Unremarkable. SMALL BOWEL: No dilatation or wall thickening. COLON: No dilatation or wall thickening. APPENDIX: Unremarkable. PERITONEUM: No ascites or pneumoperitoneum. RETROPERITONEUM: No lymphadenopathy or aortic aneurysm. REPRODUCTIVE ORGANS: The uterus is enlarged measuring 19.3 x 12.4 x 9.3 cm. There is a low-density mass again noted in the right uterine fundus measuring  10.7 x 8.6 cm which is compressing the right ureter.  Small cysts are noted  within the ovaries similar to the prior study. URINARY BLADDER: No mass or calculus. BONES: No destructive bone lesion. ABDOMINAL WALL: There is a small umbilical hernia containing fat. ADDITIONAL COMMENTS: N/A  IMPRESSION  IMPRESSION:  1. Right kidney is smaller than the left and there are areas of scarring within  the right kidney. There there are also low-density areas in the right renal  cortex which may represent right pyelonephritis. . There is right  hydroureteronephrosis similar to the prior study with the right ureter being  compressed by enlarged leiomyomatous uterus. 2. The uterus measures 19.3 x 12.4 x 9.3 cm. There are mass lesions within the  uterus with the largest in the right uterine fundus measuring 10.7 x 8.6 cm  which is compressing the right ureter. ROS:  A comprehensive review of systems was negative except for that written in the History of Present Illness. , 10 point ROS    OB/GYN ROS:  Patient denies significant menstrual problems.     ECOG ndGndrndanddndend:nd nd2nd Problem List:  Patient Active Problem List    Diagnosis Date Noted    DUB (dysfunctional uterine bleeding) 2020    Fibroid uterus 2020    Menorrhagia with regular cycle 2020    Iron deficiency anemia 06/10/2020    Low folate 06/10/2020    Leukocytosis 2020    Pyelonephritis, acute 2020    Anemia 2020    Uterine mass 2020    Sickle cell trait ([address] 2010    Obesity, morbid ([address] 2010    Leiomyoma 2010     PMH:  Past Medical History:   Diagnosis Date    Asthma      delivery 2003    GERD (gastroesophageal reflux disease)     no medication    Morbid obesity ([address]     Sickle cell trait ([address]     Sickle cell trait ([address]       PSH:  Past Surgical History:   Procedure Laterality Date    HX  SECTION      HX  SECTION      HX HEENT      TEETH EXTRACTION      Social History:  Social History     Tobacco Use    Smoking status: Current Every Day Smoker Packs/day: 0.25     Years: 4.00     Pack years: 1.00    Smokeless tobacco: Never Used   Substance Use Topics    Alcohol use: Yes     Comment: RARELY- DRINK WINE      Family History:  Family History   Problem Relation Age of Onset    Diabetes Mother     Hypertension Father     No Known Problems Sister     Hypertension Maternal Grandmother     No Known Problems Sister     Asthma Son     Asthma Daughter     Anesth Problems Neg Hx       Medications: (reviewed)  Current Outpatient Medications   Medication Sig    folic acid (FOLVITE) 1 mg tablet Take 1 Tab by mouth daily.  polyethylene glycol (MIRALAX) 17 gram/dose powder Take 17 g by mouth daily. Stop if loose stools    PNV No12-Iron-FA-DSS-OM-3 29 mg iron-1 mg -50 mg CPKD Take 1 Tab by mouth every morning.  ferrous sulfate 325 mg (65 mg iron) tablet Take 1 Tab by mouth two (2) times daily (with meals).  ondansetron (Zofran ODT) 4 mg disintegrating tablet Take 1 Tab by mouth every eight (8) hours as needed for Nausea or Vomiting. No current facility-administered medications for this visit. Allergies: (reviewed)  Allergies   Allergen Reactions    Tylenol [Acetaminophen] Itching          OBJECTIVE:    Physical Exam:  Visit Vitals  /83 (BP 1 Location: Left arm, BP Patient Position: Sitting)   Pulse 88   Ht 5' 6\" (1.676 m)   Wt 260 lb (117.9 kg)   LMP 07/27/2020   BMI 41.97 kg/m²      General: Alert and oriented. No acute distress. Well-nourished  Gastrointestinal: soft, non-tender, non-distended, no masses or organomegaly. Well-healing incisions. Musculoskeletal: normal gait. No joint tenderness, deformity or swelling. No muscular tenderness. Extremities: extremities normal, atraumatic, no cyanosis or edema. Pelvic: exam chaperoned by nurse. Normal appearing external genitalia. On speculum exam, the vagina is normal appearing. Vaginal cuff intact without defect. On bimanual, the vaginal cuff is intact without defect.  Some fullness at vaginal cuff at last visit has improved/resolved. Neuro: Grossly intact. Normal gait and movement. No acute deficit  Skin: No evidence of rashes or skin changes. IMPRESSION/PLAN:    Ms. Walda Nageotte is a 38 y.o. female who on 7/27/2020 underwent Diagnostic laparoscopy, resection of mass, total laparoscopic hysterectomy with bilateral salpingectomy, mini-laparotomy. Final pathology consistent with benign uterine fibroids. Problems:     Patient Active Problem List    Diagnosis Date Noted    DUB (dysfunctional uterine bleeding) 07/27/2020    Fibroid uterus 07/27/2020    Menorrhagia with regular cycle 06/11/2020    Iron deficiency anemia 06/10/2020    Low folate 06/10/2020    Leukocytosis 06/09/2020    Pyelonephritis, acute 06/09/2020    Anemia 06/09/2020    Uterine mass 06/09/2020    Sickle cell trait ([facility] 06/29/2010    Obesity, morbid ([facility] 06/29/2010    Leiomyoma 06/29/2010       Reviewed patient's course to date including her benign surgical pathology. She is healing well. Reassured patient. Given her benign pathology she will be discharged from Gynecologic Oncology clinic. Encouraged patient to follow-up with her PCP and ObGyn for continued routine health care maintenance. All questions and concerns were addressed with the patient and she is comfortable with the plan.      Ernest Mcardle, MD

## 2020-09-14 RX ORDER — POLYETHYLENE GLYCOL 3350 17 G/17G
POWDER, FOR SOLUTION ORAL
Qty: 235 G | Refills: 5 | Status: SHIPPED | OUTPATIENT
Start: 2020-09-14 | End: 2021-05-10

## 2020-09-29 ENCOUNTER — TELEPHONE (OUTPATIENT)
Dept: GYNECOLOGY | Age: 35
End: 2020-09-29

## 2020-09-29 NOTE — TELEPHONE ENCOUNTER
I spoke with patient, she needs to know if she should continue taking the potassium chloride?   If yes, she is requesting a refill

## 2020-12-11 ENCOUNTER — OFFICE VISIT (OUTPATIENT)
Dept: INTERNAL MEDICINE CLINIC | Age: 35
End: 2020-12-11
Payer: MEDICAID

## 2020-12-11 VITALS
HEART RATE: 105 BPM | OXYGEN SATURATION: 99 % | BODY MASS INDEX: 42.96 KG/M2 | SYSTOLIC BLOOD PRESSURE: 143 MMHG | TEMPERATURE: 97 F | RESPIRATION RATE: 14 BRPM | DIASTOLIC BLOOD PRESSURE: 74 MMHG | WEIGHT: 267.3 LBS | HEIGHT: 66 IN

## 2020-12-11 DIAGNOSIS — E66.01 OBESITY, MORBID (MORE THAN 100 LBS OVER IDEAL WEIGHT OR BMI > 40) (HCC): ICD-10-CM

## 2020-12-11 DIAGNOSIS — Z00.00 ENCOUNTER FOR MEDICAL EXAMINATION TO ESTABLISH CARE: Primary | ICD-10-CM

## 2020-12-11 DIAGNOSIS — F17.200 TOBACCO DEPENDENCY: ICD-10-CM

## 2020-12-11 DIAGNOSIS — R03.0 ELEVATED BLOOD PRESSURE READING WITHOUT DIAGNOSIS OF HYPERTENSION: ICD-10-CM

## 2020-12-11 DIAGNOSIS — D50.0 IRON DEFICIENCY ANEMIA DUE TO CHRONIC BLOOD LOSS: ICD-10-CM

## 2020-12-11 DIAGNOSIS — R00.0 TACHYCARDIA: ICD-10-CM

## 2020-12-11 DIAGNOSIS — Z79.899 ENCOUNTER FOR LONG-TERM (CURRENT) USE OF OTHER MEDICATIONS: ICD-10-CM

## 2020-12-11 PROCEDURE — 99406 BEHAV CHNG SMOKING 3-10 MIN: CPT | Performed by: FAMILY MEDICINE

## 2020-12-11 PROCEDURE — 99204 OFFICE O/P NEW MOD 45 MIN: CPT | Performed by: FAMILY MEDICINE

## 2020-12-11 NOTE — PROGRESS NOTES
SPORTS [facility]ICINE [facility]  Thelda Eisenmenger. MD Makayla  [address] [address]    Chief Complaint   Patient presents with    Establish Care       SUBJECTIVE:    Natalia Tapia is a 38 y.o. female here to establish care. Past medical history of asthma, GERD and sickle cell trait. Asthma is inactive. GERD is generally asymptomatic. Hysterectomy in July for fibroid uterus with pelvic pain and chronic menorrhagia. Patient had a blood transfusion. Patient reports her recovery time from anesthesia was longer than anticipated. No history of hypertension. Blood pressure has been elevated  1 prior to patient's knowledge. Current Outpatient Medications   Medication Sig Dispense Refill    polyethylene glycol (MIRALAX) 17 gram/dose powder DISSOLVE 17 GRAMS IN 8 OUNCES OF LIQUID AND DRINK ONCE A DAY. STOP IF LOOSE STOOLS. 235 g 5    PNV No12-Iron-FA-DSS-OM-3 29 mg iron-1 mg -50 mg CPKD Take 1 Tab by mouth every morning.        Past Medical History:   Diagnosis Date    Asthma      delivery 2003    GERD (gastroesophageal reflux disease)     no medication    Morbid obesity ([address]     Sickle cell trait ([address]     Sickle cell trait ([address]      Past Surgical History:   Procedure Laterality Date    HX  SECTION      HX  SECTION      HX HEENT      TEETH EXTRACTION     Allergies   Allergen Reactions    Tylenol [Acetaminophen] Itching       REVIEW OF SYSTEMS:   General: negative for - chills or fever  ENT:EARS ACHE FREQUENTLY; negative for - headaches, nasal congestion, tinnitus, hearing loss, vision changes, sore throat    Respiratory: ASTHMA HX negative for - cough, hemoptysis, shortness of breath or wheezing  Cardiovascular : negative for - chest pain, edema, palpitations or shortness of breath  Gastrointestinal: negative for - abdominal pain, blood in stools, heartburn or nausea/vomiting, diarrhea, constipation  Genito-Urinary: no dysuria, trouble voiding, hematuria   Musculoskeletal: negative for - gait disturbance, joint pain, joint stiffness , joint swelling, muscle aches  Neurological: no TIA or stroke symptoms  Hematologic: no bruises, no bleeding, no swollen glands  Integument: +rash- ? RELATED TO HAIR PRODUCTS; no lumps, mole changes, nail changes   Endocrine:+lethargy; no malaise/ or unexpected weight changes      Social History     Socioeconomic History    Marital status: SINGLE     Spouse name: Not on file    Number of children: Not on file    Years of education: Not on file    Highest education level: Not on file   Tobacco Use    Smoking status: Current Every Day Smoker     Packs/day: 0.25     Years: 4.00     Pack years: 1.00    Smokeless tobacco: Never Used   Substance and Sexual Activity    Alcohol use: Yes     Comment: RARELY- DRINK WINE    Drug use: No     Family History   Problem Relation Age of Onset    Diabetes Mother     Hypertension Father     No Known Problems Sister     Hypertension Maternal Grandmother     No Known Problems Sister     Asthma Son     Asthma Daughter     Anesth Problems Neg Hx        OBJECTIVE:     Visit Vitals  BP (!) 143/74   Pulse (!) 105   Temp 97 °F (36.1 °C) (Oral)   Resp 14   Ht 5' 6\" (1.676 m)   Wt 267 lb 4.8 oz (121.2 kg)   LMP 07/27/2020   SpO2 99%   BMI 43.14 kg/m²     CONSTITUTIONAL: well developed , appears age appropriate, nad  EYES: perrla, eom intact  ENMT:moist mucous membranes, pharynx clear  NECK: supple. Thyroid normal  RESPIRATORY: Chest: clear bilaterally  CARDIOVASCULAR: Heart: regular rate and rhythm  GASTROINTESTINAL: Abdomen: soft, bowel sounds active  HEMATOLOGIC: no pathological lymph nodes palpated  MUSCULOSKELETAL: Extremities: no edema, pulse 2+   INTEGUMENT: Warm and dry. NEUROLOGIC: non-focal exam   MENTAL STATUS: alert and oriented, appropriate affect     No visits with results within 3 Month(s) from this visit.    Latest known visit with results is:   Office Visit on 08/19/2020   Component Date Value Ref Range Status    WBC 08/19/2020 6.7  3.4 - 10.8 x10E3/uL Final    RBC 08/19/2020 4.94  3.77 - 5.28 x10E6/uL Final    HGB 08/19/2020 9.1* 11.1 - 15.9 g/dL Final    HCT 08/19/2020 30.8* 34.0 - 46.6 % Final    MCV 08/19/2020 62* 79 - 97 fL Final    MCH 08/19/2020 18.4* 26.6 - 33.0 pg Final    MCHC 08/19/2020 29.5* 31.5 - 35.7 g/dL Final    RDW 08/19/2020 24.3* 11.7 - 15.4 % Final    PLATELET 49/53/7702 541* 150 - 450 x10E3/uL Final    NEUTROPHILS 08/19/2020 61  Not Estab. % Final    Lymphocytes 08/19/2020 23  Not Estab. % Final    MONOCYTES 08/19/2020 12  Not Estab. % Final    EOSINOPHILS 08/19/2020 3  Not Estab. % Final    BASOPHILS 08/19/2020 1  Not Estab. % Final    ABS. NEUTROPHILS 08/19/2020 4.1  1.4 - 7.0 x10E3/uL Final    Abs Lymphocytes 08/19/2020 1.5  0.7 - 3.1 x10E3/uL Final    ABS. MONOCYTES 08/19/2020 0.8  0.1 - 0.9 x10E3/uL Final    ABS. EOSINOPHILS 08/19/2020 0.2  0.0 - 0.4 x10E3/uL Final    ABS. BASOPHILS 08/19/2020 0.1  0.0 - 0.2 x10E3/uL Final    IMMATURE GRANULOCYTES 08/19/2020 0  Not Estab. % Final    ABS. IMM. GRANS. 08/19/2020 0.0  0.0 - 0.1 x10E3/uL Final    Hematology comments: 08/19/2020 Note:   Final    Verified by microscopic examination.  Glucose 08/19/2020 90  65 - 99 mg/dL Final    BUN 08/19/2020 7  6 - 20 mg/dL Final    Creatinine 08/19/2020 0.82  0.57 - 1.00 mg/dL Final    GFR est non-AA 08/19/2020 92  >59 mL/min/1.73 Final    GFR est AA 08/19/2020 106  >59 mL/min/1.73 Final    BUN/Creatinine ratio 08/19/2020 9  9 - 23 Final    Sodium 08/19/2020 139  134 - 144 mmol/L Final    Potassium 08/19/2020 4.6  3.5 - 5.2 mmol/L Final    Chloride 08/19/2020 103  96 - 106 mmol/L Final    CO2 08/19/2020 21  20 - 29 mmol/L Final    Calcium 08/19/2020 9.4  8.7 - 10.2 mg/dL Final       ASSESSMENT:   1. Encounter for medical examination to establish care    2. Elevated blood pressure reading without diagnosis of hypertension    3. Iron deficiency anemia due to chronic blood loss    4. Tachycardia -consider nicotine/caffeine, anxiety, dehydration, hyperthyroidism,   5. Tobacco dependency    6. Obesity, morbid (more than 100 lbs over ideal weight or BMI > 40) (HCC)    7. Encounter for long-term (current) use of other medications        I have discussed the diagnosis with the patient and the intended plan as seen in the  orders. The patient understands and agrees with the plan. The patient has   received an after visit summary and questions were answered concerning  future plans  Patient labs and/or xrays were reviewed  Past records were reviewed. PLAN:  .  Orders Placed This Encounter    HEMOGLOBIN A1C WITH EAG    CBC WITH AUTOMATED DIFF    METABOLIC PANEL, COMPREHENSIVE    LIPID PANEL    URINALYSIS W/ RFLX MICROSCOPIC    TSH 3RD GENERATION       Follow-up and Dispositions    · Return in about 1 week (around 12/18/2020) for blood pressure follow up- NURSE VISIT, blood pressure follow up. Counseled regarding diet, exercise and healthy lifestyle   Advised patient to  call back or return to office if symptoms worsen/change/persist.  Discussed expected course/resolution/complications of diagnosis in detail with patient. Medication risks/benefits/costs/interactions/alternatives discussed with patient  TOBACCO COUNSELING:  Spent 3minutes discussing the risks of smoking and the benefits of smoking cessation as well as the long term sequelae of smoking with the pt who verbalized his understanding. Reviewed strategies for success, including gradually decreasing the number of cigarettes smoked a day. Tommy Chiang M.D. A total of at least 45 min was spent during this evaluation of which half was spent in counseling and care coordination    This note was created using voice recognition software.   Edits have been made but syntax errors might exist.

## 2020-12-11 NOTE — PATIENT INSTRUCTIONS
Elevated Blood Pressure: Care Instructions Your Care Instructions Blood pressure is a measure of how hard the blood pushes against the walls of your arteries. It's normal for blood pressure to go up and down throughout the day. But if it stays up over time, you have high blood pressure. Two numbers tell you your blood pressure. The first number is the systolic pressure. It shows how hard the blood pushes when your heart is pumping. The second number is the diastolic pressure. It shows how hard the blood pushes between heartbeats, when your heart is relaxed and filling with blood. An ideal blood pressure in adults is less than 120/80 (say \"120 over 80\"). High blood pressure is 140/90 or higher. You have high blood pressure if your top number is 140 or higher or your bottom number is 90 or higher, or both. The main test for high blood pressure is simple, fast, and painless. To diagnose high blood pressure, your doctor will test your blood pressure at different times. After testing your blood pressure, your doctor may ask you to test it again when you are home. If you are diagnosed with high blood pressure, you can work with your doctor to make a long-term plan to manage it. Follow-up care is a key part of your treatment and safety. Be sure to make and go to all appointments, and call your doctor if you are having problems. It's also a good idea to know your test results and keep a list of the medicines you take. How can you care for yourself at home? · Do not smoke. Smoking increases your risk for heart attack and stroke. If you need help quitting, talk to your doctor about stop-smoking programs and medicines. These can increase your chances of quitting for good. · Stay at a healthy weight. · Try to limit how much sodium you eat to less than 2,300 milligrams (mg) a day. Your doctor may ask you to try to eat less than 1,500 mg a day. · Be physically active. Get at least 30 minutes of exercise on most days of the week. Walking is a good choice. You also may want to do other activities, such as running, swimming, cycling, or playing tennis or team sports. · Avoid or limit alcohol. Talk to your doctor about whether you can drink any alcohol. · Eat plenty of fruits, vegetables, and low-fat dairy products. Eat less saturated and total fats. · Learn how to check your blood pressure at home. When should you call for help? Call your doctor now or seek immediate medical care if: 
? · Your blood pressure is much higher than normal (such as 180/110 or higher). ? · You think high blood pressure is causing symptoms such as: ¨ Severe headache. ¨ Blurry vision. ? Watch closely for changes in your health, and be sure to contact your doctor if: 
? · You do not get better as expected. Where can you learn more? Go to http://www.gray.com/. Enter C374 in the search box to learn more about \"Elevated Blood Pressure: Care Instructions. \" Current as of: September 21, 2016 Content Version: 11.4 © 2261-9887 [employer]. Care instructions adapted under license by [employer] (which disclaims liability or warranty for this information). If you have questions about a medical condition or this instruction, always ask your healthcare professional. [address] any warranty or liability for your use of this information. Low Sodium Diet (2,000 Milligram): Care Instructions Your Care Instructions Too much sodium causes your body to hold on to extra water. This can raise your blood pressure and force your heart and kidneys to work harder. In very serious cases, this could cause you to be put in the hospital. It might even be life-threatening. By limiting sodium, you will feel better and lower your risk of serious problems. The most common source of sodium is salt. People get most of the salt in their diet from canned, prepared, and packaged foods. Fast food and restaurant meals also are very high in sodium. Your doctor will probably limit your sodium to less than 2,000 milligrams (mg) a day. This limit counts all the sodium in prepared and packaged foods and any salt you add to your food. Follow-up care is a key part of your treatment and safety. Be sure to make and go to all appointments, and call your doctor if you are having problems. It's also a good idea to know your test results and keep a list of the medicines you take. How can you care for yourself at home? Read food labels · Read labels on cans and food packages. The labels tell you how much sodium is in each serving. Make sure that you look at the serving size. If you eat more than the serving size, you have eaten more sodium. · Food labels also tell you the Percent Daily Value for sodium. Choose products with low Percent Daily Values for sodium. · Be aware that sodium can come in forms other than salt, including monosodium glutamate (MSG), sodium citrate, and sodium bicarbonate (baking soda). MSG is often added to Asian food. When you eat out, you can sometimes ask for food without MSG or added salt. Buy low-sodium foods · Buy foods that are labeled \"unsalted\" (no salt added), \"sodium-free\" (less than 5 mg of sodium per serving), or \"low-sodium\" (less than 140 mg of sodium per serving). Foods labeled \"reduced-sodium\" and \"light sodium\" may still have too much sodium. Be sure to read the label to see how much sodium you are getting. · Buy fresh vegetables, or frozen vegetables without added sauces. Buy low-sodium versions of canned vegetables, soups, and other canned goods. Prepare low-sodium meals · Cut back on the amount of salt you use in cooking. This will help you adjust to the taste. Do not add salt after cooking.  One teaspoon of salt has about 2,300 mg of sodium. · Take the salt shaker off the table. · Flavor your food with garlic, lemon juice, onion, vinegar, herbs, and spices. Do not use soy sauce, lite soy sauce, steak sauce, onion salt, garlic salt, celery salt, mustard, or ketchup on your food. · Use low-sodium salad dressings, sauces, and ketchup. Or make your own salad dressings and sauces without adding salt. · Use less salt (or none) when recipes call for it. You can often use half the salt a recipe calls for without losing flavor. Other foods such as rice, pasta, and grains do not need added salt. · Rinse canned vegetables, and cook them in fresh water. This removes somebut not allof the salt. · Avoid water that is naturally high in sodium or that has been treated with water softeners, which add sodium. Call your local water company to find out the sodium content of your water supply. If you buy bottled water, read the label and choose a sodium-free brand. Avoid high-sodium foods · Avoid eating: 
? Smoked, cured, salted, and canned meat, fish, and poultry. ? Ham, grajeda, hot dogs, and luncheon meats. ? Regular, hard, and processed cheese and regular peanut butter. ? Crackers with salted tops, and other salted snack foods such as pretzels, chips, and salted popcorn. ? Frozen prepared meals, unless labeled low-sodium. ? Canned and dried soups, broths, and bouillon, unless labeled sodium-free or low-sodium. ? Canned vegetables, unless labeled sodium-free or low-sodium. ? [place] fries, pizza, tacos, and other fast foods. ? Pickles, olives, ketchup, and other condiments, especially soy sauce, unless labeled sodium-free or low-sodium. Where can you learn more? Go to http://www.gray.com/ Enter S708 in the search box to learn more about \"Low Sodium Diet (2,000 Milligram): Care Instructions. \" Current as of: August 22, 2019               Content Version: 12.6 © 1820-1097 [employer]. Care instructions adapted under license by [employer] (which disclaims liability or warranty for this information). If you have questions about a medical condition or this instruction, always ask your healthcare professional. [address] any warranty or liability for your use of this information. Eating Healthy Foods: Care Instructions Your Care Instructions Eating healthy foods can help lower your risk for disease. Healthy food gives you energy and keeps your heart strong, your brain active, your muscles working, and your bones strong. A healthy diet includes a variety of foods from the basic food groups: grains, vegetables, fruits, milk and milk products, and meat and beans. Some people may eat more of their favorite foods from only one food group and, as a result, miss getting the nutrients they need. So, it is important to pay attention not only to what you eat but also to what you are missing from your diet. You can eat a healthy, balanced diet by making a few small changes. Follow-up care is a key part of your treatment and safety. Be sure to make and go to all appointments, and call your doctor if you are having problems. It's also a good idea to know your test results and keep a list of the medicines you take. How can you care for yourself at home? Look at what you eat · Keep a food diary for a week or two and record everything you eat or drink. Track the number of servings you eat from each food group. · For a balanced diet every day, eat a variety of: 
? 6 or more ounce-equivalents of grains, such as cereals, breads, crackers, rice, or pasta, every day. An ounce-equivalent is 1 slice of bread, 1 cup of ready-to-eat cereal, or ½ cup of cooked rice, cooked pasta, or cooked cereal. 
? 2½ cups of vegetables, especially: § Dark-green vegetables such as broccoli and spinach. § Orange vegetables such as carrots and sweet potatoes. § Dry beans (such as muhammad and kidney beans) and peas (such as lentils). ? 2 cups of fresh, frozen, or canned fruit. A small apple or 1 banana or orange equals 1 cup. ? 3 cups of nonfat or low-fat milk, yogurt, or other milk products. ? 5½ ounces of meat and beans, such as chicken, fish, lean meat, beans, nuts, and seeds. One egg, 1 tablespoon of peanut butter, ½ ounce nuts or seeds, or ¼ cup of cooked beans equals 1 ounce of meat. · Learn how to read food labels for serving sizes and ingredients. Fast-food and convenience-food meals often contain few or no fruits or vegetables. Make sure you eat some fruits and vegetables to make the meal more nutritious. · Look at your food diary. For each food group, add up what you have eaten and then divide the total by the number of days. This will give you an idea of how much you are eating from each food group. See if you can find some ways to change your diet to make it more healthy. Start small · Do not try to make dramatic changes to your diet all at once. You might feel that you are missing out on your favorite foods and then be more likely to fail. · Start slowly, and gradually change your habits. Try some of the following: ? Use whole wheat bread instead of white bread. ? Use nonfat or low-fat milk instead of whole milk. ? Eat brown rice instead of white rice, and eat whole wheat pasta instead of white-flour pasta. ? Try low-fat cheeses and low-fat yogurt. ? Add more fruits and vegetables to meals and have them for snacks. ? Add lettuce, tomato, cucumber, and onion to sandwiches. ? Add fruit to yogurt and cereal. 
Enjoy food · You can still eat your favorite foods. You just may need to eat less of them. If your favorite foods are high in fat, salt, and sugar, limit how often you eat them, but do not cut them out entirely. · Eat a wide variety of foods. Make healthy choices when eating out · The type of restaurant you choose can help you make healthy choices. Even fast-food chains are now offering more low-fat or healthier choices on the menu. · Choose smaller portions, or take half of your meal home. · When eating out, try: ? A veggie pizza with a whole wheat crust or grilled chicken (instead of sausage or pepperoni). ? Pasta with roasted vegetables, grilled chicken, or marinara sauce instead of cream sauce. ? A vegetable wrap or grilled chicken wrap. ? Broiled or poached food instead of fried or breaded items. Make healthy choices easy · Buy packaged, prewashed, ready-to-eat fresh vegetables and fruits, such as baby carrots, salad mixes, and chopped or shredded broccoli and cauliflower. · Buy packaged, presliced fruits, such as melon or pineapple. · Choose 100% fruit or vegetable juice instead of soda. Limit juice intake to 4 to 6 oz (½ to ¾ cup) a day. · Blend low-fat yogurt, fruit juice, and canned or frozen fruit to make a smoothie for breakfast or a snack. Where can you learn more? Go to http://www.Ringio.com/ Enter T756 in the search box to learn more about \"Eating Healthy Foods: Care Instructions. \" Current as of: August 22, 2019               Content Version: 12.6 © 9759-2460 [employer]. Care instructions adapted under license by [employer] (which disclaims liability or warranty for this information). If you have questions about a medical condition or this instruction, always ask your healthcare professional. [place] [address] any warranty or liability for your use of this information. Eating Healthy Foods: Care Instructions Your Care Instructions Eating healthy foods can help lower your risk for disease. Healthy food gives you energy and keeps your heart strong, your brain active, your muscles working, and your bones strong.  
A healthy diet includes a variety of foods from the basic food groups: grains, vegetables, fruits, milk and milk products, and meat and beans. Some people may eat more of their favorite foods from only one food group and, as a result, miss getting the nutrients they need. So, it is important to pay attention not only to what you eat but also to what you are missing from your diet. You can eat a healthy, balanced diet by making a few small changes. Follow-up care is a key part of your treatment and safety. Be sure to make and go to all appointments, and call your doctor if you are having problems. It's also a good idea to know your test results and keep a list of the medicines you take. How can you care for yourself at home? Look at what you eat · Keep a food diary for a week or two and record everything you eat or drink. Track the number of servings you eat from each food group. · For a balanced diet every day, eat a variety of: 
? 6 or more ounce-equivalents of grains, such as cereals, breads, crackers, rice, or pasta, every day. An ounce-equivalent is 1 slice of bread, 1 cup of ready-to-eat cereal, or ½ cup of cooked rice, cooked pasta, or cooked cereal. 
? 2½ cups of vegetables, especially: § Dark-green vegetables such as broccoli and spinach. § Orange vegetables such as carrots and sweet potatoes. § Dry beans (such as muhammad and kidney beans) and peas (such as lentils). ? 2 cups of fresh, frozen, or canned fruit. A small apple or 1 banana or orange equals 1 cup. ? 3 cups of nonfat or low-fat milk, yogurt, or other milk products. ? 5½ ounces of meat and beans, such as chicken, fish, lean meat, beans, nuts, and seeds. One egg, 1 tablespoon of peanut butter, ½ ounce nuts or seeds, or ¼ cup of cooked beans equals 1 ounce of meat. · Learn how to read food labels for serving sizes and ingredients. Fast-food and convenience-food meals often contain few or no fruits or vegetables. Make sure you eat some fruits and vegetables to make the meal more nutritious. · Look at your food diary. For each food group, add up what you have eaten and then divide the total by the number of days. This will give you an idea of how much you are eating from each food group. See if you can find some ways to change your diet to make it more healthy. Start small · Do not try to make dramatic changes to your diet all at once. You might feel that you are missing out on your favorite foods and then be more likely to fail. · Start slowly, and gradually change your habits. Try some of the following: ? Use whole wheat bread instead of white bread. ? Use nonfat or low-fat milk instead of whole milk. ? Eat brown rice instead of white rice, and eat whole wheat pasta instead of white-flour pasta. ? Try low-fat cheeses and low-fat yogurt. ? Add more fruits and vegetables to meals and have them for snacks. ? Add lettuce, tomato, cucumber, and onion to sandwiches. ? Add fruit to yogurt and cereal. 
Enjoy food · You can still eat your favorite foods. You just may need to eat less of them. If your favorite foods are high in fat, salt, and sugar, limit how often you eat them, but do not cut them out entirely. · Eat a wide variety of foods. Make healthy choices when eating out · The type of restaurant you choose can help you make healthy choices. Even fast-food chains are now offering more low-fat or healthier choices on the menu. · Choose smaller portions, or take half of your meal home. · When eating out, try: ? A veggie pizza with a whole wheat crust or grilled chicken (instead of sausage or pepperoni). ? Pasta with roasted vegetables, grilled chicken, or marinara sauce instead of cream sauce. ? A vegetable wrap or grilled chicken wrap. ? Broiled or poached food instead of fried or breaded items. Make healthy choices easy · Buy packaged, prewashed, ready-to-eat fresh vegetables and fruits, such as baby carrots, salad mixes, and chopped or shredded broccoli and cauliflower. · Buy packaged, presliced fruits, such as melon or pineapple. · Choose 100% fruit or vegetable juice instead of soda. Limit juice intake to 4 to 6 oz (½ to ¾ cup) a day. · Blend low-fat yogurt, fruit juice, and canned or frozen fruit to make a smoothie for breakfast or a snack. Where can you learn more? Go to http://www.graves.com/ Enter T756 in the search box to learn more about \"Eating Healthy Foods: Care Instructions. \" Current as of: August 22, 2019               Content Version: 12.6 © 8607-7773 [employer]. Care instructions adapted under license by [employer] (which disclaims liability or warranty for this information). If you have questions about a medical condition or this instruction, always ask your healthcare professional. [address] any warranty or liability for your use of this information. Eating Healthy Foods: Care Instructions Your Care Instructions Eating healthy foods can help lower your risk for disease. Healthy food gives you energy and keeps your heart strong, your brain active, your muscles working, and your bones strong. A healthy diet includes a variety of foods from the basic food groups: grains, vegetables, fruits, milk and milk products, and meat and beans. Some people may eat more of their favorite foods from only one food group and, as a result, miss getting the nutrients they need. So, it is important to pay attention not only to what you eat but also to what you are missing from your diet. You can eat a healthy, balanced diet by making a few small changes. Follow-up care is a key part of your treatment and safety. Be sure to make and go to all appointments, and call your doctor if you are having problems. It's also a good idea to know your test results and keep a list of the medicines you take. How can you care for yourself at home? Look at what you eat · Keep a food diary for a week or two and record everything you eat or drink. Track the number of servings you eat from each food group. · For a balanced diet every day, eat a variety of: 
? 6 or more ounce-equivalents of grains, such as cereals, breads, crackers, rice, or pasta, every day. An ounce-equivalent is 1 slice of bread, 1 cup of ready-to-eat cereal, or ½ cup of cooked rice, cooked pasta, or cooked cereal. 
? 2½ cups of vegetables, especially: § Dark-green vegetables such as broccoli and spinach. § Orange vegetables such as carrots and sweet potatoes. § Dry beans (such as muhammad and kidney beans) and peas (such as lentils). ? 2 cups of fresh, frozen, or canned fruit. A small apple or 1 banana or orange equals 1 cup. ? 3 cups of nonfat or low-fat milk, yogurt, or other milk products. ? 5½ ounces of meat and beans, such as chicken, fish, lean meat, beans, nuts, and seeds. One egg, 1 tablespoon of peanut butter, ½ ounce nuts or seeds, or ¼ cup of cooked beans equals 1 ounce of meat. · Learn how to read food labels for serving sizes and ingredients. Fast-food and convenience-food meals often contain few or no fruits or vegetables. Make sure you eat some fruits and vegetables to make the meal more nutritious. · Look at your food diary. For each food group, add up what you have eaten and then divide the total by the number of days. This will give you an idea of how much you are eating from each food group. See if you can find some ways to change your diet to make it more healthy. Start small · Do not try to make dramatic changes to your diet all at once. You might feel that you are missing out on your favorite foods and then be more likely to fail. · Start slowly, and gradually change your habits. Try some of the following: ? Use whole wheat bread instead of white bread. ? Use nonfat or low-fat milk instead of whole milk.  
? Eat brown rice instead of white rice, and eat whole wheat pasta instead of white-flour pasta. ? Try low-fat cheeses and low-fat yogurt. ? Add more fruits and vegetables to meals and have them for snacks. ? Add lettuce, tomato, cucumber, and onion to sandwiches. ? Add fruit to yogurt and cereal. 
Enjoy food · You can still eat your favorite foods. You just may need to eat less of them. If your favorite foods are high in fat, salt, and sugar, limit how often you eat them, but do not cut them out entirely. · Eat a wide variety of foods. Make healthy choices when eating out · The type of restaurant you choose can help you make healthy choices. Even fast-food chains are now offering more low-fat or healthier choices on the menu. · Choose smaller portions, or take half of your meal home. · When eating out, try: ? A veggie pizza with a whole wheat crust or grilled chicken (instead of sausage or pepperoni). ? Pasta with roasted vegetables, grilled chicken, or marinara sauce instead of cream sauce. ? A vegetable wrap or grilled chicken wrap. ? Broiled or poached food instead of fried or breaded items. Make healthy choices easy · Buy packaged, prewashed, ready-to-eat fresh vegetables and fruits, such as baby carrots, salad mixes, and chopped or shredded broccoli and cauliflower. · Buy packaged, presliced fruits, such as melon or pineapple. · Choose 100% fruit or vegetable juice instead of soda. Limit juice intake to 4 to 6 oz (½ to ¾ cup) a day. · Blend low-fat yogurt, fruit juice, and canned or frozen fruit to make a smoothie for breakfast or a snack. Where can you learn more? Go to http://www.[employer].com/ Enter T756 in the search box to learn more about \"Eating Healthy Foods: Care Instructions. \" Current as of: August 22, 2019               Content Version: 12.6 © 7019-5803 [employer]. Care instructions adapted under license by [employer] (which disclaims liability or warranty for this information).  If you have questions about a medical condition or this instruction, always ask your healthcare professional. [address] any warranty or liability for your use of this information. Learning About Sleep Apnea What is it? Sleep apnea means that breathing stops for short periods during sleep. When you stop breathing or have reduced airflow into your lungs during sleep, you don't sleep well and you can be very tired during the day. The oxygen levels in your blood may go down, and carbon dioxide levels go up. It may lead to other problems, such as high blood pressure and heart disease. Sleep apnea can range from mild to severe, based on how often breathing stops during sleep. Breathing may stop as few as 5 times an hour (mild apnea) to 30 or more times an hour (severe apnea). Obstructive sleep apnea is the most common type. This most often occurs because your airways are blocked or partly blocked. Central sleep apnea is less common. It happens when the brain has trouble controlling breathing. Some people have both types. That's called complex sleep apnea. What are the symptoms? There are symptoms of sleep apnea that you may notice and symptoms that others may notice when you're asleep. Symptoms you may notice include: · Feeling extremely sleepy during the day. · Feeling unrefreshed or tired after a night's sleep. · Problems with memory and concentration, or mood changes. · Morning or night headaches. · Heartburn or a sour taste in your mouth at night. · Swelling of the legs. · Getting up often during the night to urinate. · A dry mouth or sore throat in the morning. Your bed partner may notice that you: 
· Have episodes of not breathing. · Snore loudly. Almost all people who have sleep apnea snore. But not all people who snore have sleep apnea. · Toss and turn during sleep. · Have nighttime choking or gasping spells. How is it diagnosed? Your doctor will probably do a physical exam and ask about your past health. He or she may also ask you or your bed partner about your snoring and sleep behavior and how tired you feel during the day. Your doctor may suggest a sleep study. Sleep studies are a series of tests that look at what happens to the body during sleep. They check for how often you stop breathing or have too little air flowing into your lungs during sleep. They also find out how much oxygen you have in your blood during sleep. A sleep study may take place in your home. Or it might take place at a sleep center, where you will spend the night. How is it treated? Sleep apnea is often treated with devices that deliver air through a mask to help keep your airways open. These include: 
· Continuous positive airway pressure (CPAP). This increases air pressure in your throat. It keeps your airway open when you breathe in. It's the most common device. · Bilevel positive airway pressure (BiPAP). This uses different air pressures when you breathe in and out. · Adaptive servo ventilation (ASV). It senses pauses in breathing and adjusts air pressure. It's mostly used for central sleep apnea. If your tonsils or other tissues are blocking your airway, your doctor may suggest surgery to open the airway. How can you care for yourself? You may be able to treat mild sleep apnea by making changes in how you live and the way you sleep. For example, it may help to: 
· Lose weight if you are overweight. · Sleep on your side, not your back. · Avoid alcohol and medicines such as sedatives before bed. You may also try an oral breathing device. It helps keep your airways open while you sleep. Where can you learn more? Go to http://www.gray.com/ Enter S121 in the search box to learn more about \"Learning About Sleep Apnea. \" Current as of: February 24, 2020               Content Version: 12.6 © 7609-8054 [employer]. Care instructions adapted under license by [employer] (which disclaims liability or warranty for this information). If you have questions about a medical condition or this instruction, always ask your healthcare professional. [address] any warranty or liability for your use of this information.

## 2020-12-11 NOTE — PROGRESS NOTES
1. Have you been to the ER, urgent care clinic since your last visit? Hospitalized since your last visit? No    2. Have you seen or consulted any other health care providers outside of the [address] [place] [address] since your last visit? Include any pap smears or colon screening.  No

## 2020-12-15 LAB
ALBUMIN SERPL-MCNC: 3.6 G/DL (ref 3.5–5)
ALBUMIN/GLOB SERPL: 1 {RATIO} (ref 1.1–2.2)
ALP SERPL-CCNC: 92 U/L (ref 45–117)
ALT SERPL-CCNC: 14 U/L (ref 12–78)
ANION GAP SERPL CALC-SCNC: 5 MMOL/L (ref 5–15)
AST SERPL-CCNC: 14 U/L (ref 15–37)
BILIRUB SERPL-MCNC: 0.3 MG/DL (ref 0.2–1)
BUN SERPL-MCNC: 12 MG/DL (ref 6–20)
BUN/CREAT SERPL: 13 (ref 12–20)
CALCIUM SERPL-MCNC: 8.5 MG/DL (ref 8.5–10.1)
CHLORIDE SERPL-SCNC: 109 MMOL/L (ref 97–108)
CHOLEST SERPL-MCNC: 174 MG/DL
CO2 SERPL-SCNC: 26 MMOL/L (ref 21–32)
CREAT SERPL-MCNC: 0.89 MG/DL (ref 0.55–1.02)
GLOBULIN SER CALC-MCNC: 3.6 G/DL (ref 2–4)
GLUCOSE SERPL-MCNC: 81 MG/DL (ref 65–100)
HDLC SERPL-MCNC: 61 MG/DL
HDLC SERPL: 2.9 {RATIO} (ref 0–5)
LDLC SERPL CALC-MCNC: 98.6 MG/DL (ref 0–100)
LIPID PROFILE,FLP: NORMAL
POTASSIUM SERPL-SCNC: 4.8 MMOL/L (ref 3.5–5.1)
PROT SERPL-MCNC: 7.2 G/DL (ref 6.4–8.2)
SODIUM SERPL-SCNC: 140 MMOL/L (ref 136–145)
TRIGL SERPL-MCNC: 72 MG/DL (ref ?–150)
TSH SERPL DL<=0.05 MIU/L-ACNC: 0.58 UIU/ML (ref 0.36–3.74)
VLDLC SERPL CALC-MCNC: 14.4 MG/DL

## 2021-04-19 ENCOUNTER — OFFICE VISIT (OUTPATIENT)
Dept: INTERNAL MEDICINE CLINIC | Age: 36
End: 2021-04-19
Payer: MEDICAID

## 2021-04-19 VITALS
WEIGHT: 271 LBS | TEMPERATURE: 98.6 F | HEART RATE: 83 BPM | OXYGEN SATURATION: 100 % | BODY MASS INDEX: 43.55 KG/M2 | HEIGHT: 66 IN | SYSTOLIC BLOOD PRESSURE: 144 MMHG | DIASTOLIC BLOOD PRESSURE: 81 MMHG | RESPIRATION RATE: 16 BRPM

## 2021-04-19 DIAGNOSIS — T14.8XXA BITE: Primary | ICD-10-CM

## 2021-04-19 DIAGNOSIS — R51.9 FACE PAIN: ICD-10-CM

## 2021-04-19 PROCEDURE — 99213 OFFICE O/P EST LOW 20 MIN: CPT | Performed by: FAMILY MEDICINE

## 2021-04-25 PROBLEM — T14.8XXA BITE: Status: ACTIVE | Noted: 2021-04-25

## 2021-04-25 PROBLEM — R51.9 FACE PAIN: Status: ACTIVE | Noted: 2021-04-25

## 2021-04-28 ENCOUNTER — HOSPITAL ENCOUNTER (EMERGENCY)
Age: 36
Discharge: HOME | End: 2021-04-29
Attending: EMERGENCY MEDICINE
Payer: MEDICAID

## 2021-04-28 ENCOUNTER — APPOINTMENT (OUTPATIENT)
Dept: GENERAL RADIOLOGY | Age: 36
End: 2021-04-28
Attending: EMERGENCY MEDICINE
Payer: MEDICAID

## 2021-04-28 DIAGNOSIS — M54.32 SCIATICA OF LEFT SIDE: Primary | ICD-10-CM

## 2021-04-28 PROCEDURE — 96372 THER/PROPH/DIAG INJ SC/IM: CPT

## 2021-04-28 PROCEDURE — 72100 X-RAY EXAM L-S SPINE 2/3 VWS: CPT

## 2021-04-28 PROCEDURE — 99282 EMERGENCY DEPT VISIT SF MDM: CPT

## 2021-04-28 RX ORDER — KETOROLAC TROMETHAMINE 30 MG/ML
60 INJECTION, SOLUTION INTRAMUSCULAR; INTRAVENOUS
Status: COMPLETED | OUTPATIENT
Start: 2021-04-28 | End: 2021-04-29

## 2021-04-28 NOTE — Clinical Note
[address] 
[facility] EMERGENCY DEPT 
[address] [place] [id number] [phone] Work/School Note Date: 4/28/2021 To Whom It May concern: 
 
Renetta Pearson was seen and treated today in the emergency room by the following provider(s): 
Attending Provider: Ruby Romero MD.   
 
eRnetta Pearson is excused from work/school on 4/29/2021 through 5/2/2021. She is medically clear to return to work/school on 5/3/2021. Sincerely, Shar Krueger MD

## 2021-04-29 VITALS
HEIGHT: 66 IN | HEART RATE: 78 BPM | WEIGHT: 277.12 LBS | OXYGEN SATURATION: 99 % | RESPIRATION RATE: 17 BRPM | SYSTOLIC BLOOD PRESSURE: 151 MMHG | TEMPERATURE: 98 F | DIASTOLIC BLOOD PRESSURE: 78 MMHG | BODY MASS INDEX: 44.54 KG/M2

## 2021-04-29 PROCEDURE — 74011250636 HC RX REV CODE- 250/636: Performed by: EMERGENCY MEDICINE

## 2021-04-29 RX ORDER — NAPROXEN 500 MG/1
500 TABLET ORAL 2 TIMES DAILY WITH MEALS
Qty: 20 TAB | Refills: 0 | Status: SHIPPED | OUTPATIENT
Start: 2021-04-29 | End: 2021-10-02

## 2021-04-29 RX ORDER — CYCLOBENZAPRINE HCL 10 MG
10 TABLET ORAL 2 TIMES DAILY
Qty: 20 TAB | Refills: 0 | Status: SHIPPED | OUTPATIENT
Start: 2021-04-29 | End: 2021-10-02

## 2021-04-29 RX ORDER — METHYLPREDNISOLONE 4 MG/1
TABLET ORAL
Qty: 1 DOSE PACK | Refills: 0 | Status: SHIPPED | OUTPATIENT
Start: 2021-04-29 | End: 2021-10-02

## 2021-04-29 RX ADMIN — KETOROLAC TROMETHAMINE 60 MG: 30 INJECTION, SOLUTION INTRAMUSCULAR at 01:11

## 2021-04-29 NOTE — ED PROVIDER NOTES
The patient is a 57-year-old female with a past medical history significant for morbid obesity who presents to the ED with a complaint of left thigh pain that radiated down to the foot accompanied by intermittent episode of dullness, throbbing discomfort, severity 8 out of 10, radiating up to the leg.   The patient denies any fever and chills, chest pain, shortness of breath, nausea, vomiting, diarrhea, constipation, dysuria, sick           Past Medical History:   Diagnosis Date    Asthma      delivery 2003    GERD (gastroesophageal reflux disease)     no medication    Morbid obesity ([address]     [facility]     Sick[facility] cell trait ([facility]        Past Surgical History:   Procedure Laterality Date    HX  SECTION      HX  SECTION      HX HEENT      TEETH EXTRACTION         Family History:   Problem Relation Age of Onset    Diabetes Mother     Hypertension Father     No Known Problems Sister     Hypertension Maternal Grandmother     No Known Problems Sister     Asthma Son     Asthma Daughter     Anesth Problems Neg Hx        Social History     Socioeconomic History    Marital status: SINGLE     Spouse name: Not on file    Number of children: Not on file    Years of education: Not on file    Highest education level: Not on file   Occupational History    Not on file   Social Needs    Financial resource strain: Not on file    Food insecurity     Worry: Not on file     Inability: Not on file    Transportation needs     Medical: Not on file     Non-medical: Not on file   Tobacco Use    Smoking status: Current Every Day Smoker     Packs/day: 0.25     Years: 4.00     Pack years: 1.00    Smokeless tobacco: Never Used   Substance and Sexual Activity    Alcohol use: Yes     Comment: RARELY- DRINK WINE    Drug use: No    Sexual activity: Not on file   Lifestyle    Physical activity     Days per week: Not on file     Minutes per session: Not on file    Stress: Not on file   Relationships    Social connections     Talks on phone: Not on file     Gets together: Not on file     Attends Orthodoxy service: Not on file     Active member of club or organization: Not on file     Attends meetings of clubs or organizations: Not on file     Relationship status: Not on file    Intimate partner violence     Fear of current or ex partner: Not on file     Emotionally abused: Not on file     Physically abused: Not on file     Forced sexual activity: Not on file   Other Topics Concern    Not on file   Social History Narrative    Not on file         ALLERGIES: Tylenol [acetaminophen]    Review of Systems   All other systems reviewed and are negative. Vitals:    04/28/21 2235 04/29/21 0112   BP: (!) 160/87 (!) 151/78   Pulse: 90 78   Resp: 18 17   Temp: 97.9 °F (36.6 °C) 98 °F (36.7 °C)   SpO2: 99% 99%   Weight: 125.7 kg (277 lb 1.9 oz)    Height: 5' 6\" (1.676 m)             Physical Exam  Vitals signs and nursing note reviewed. Exam conducted with a chaperone present. CONSTITUTIONAL: Well-appearing; well-nourished; in no apparent distress  HEAD: Normocephalic; atraumatic  EYES: PERRL; EOM intact; conjunctiva and sclera are clear bilaterally. ENT: No rhinorrhea; normal pharynx with no tonsillar hypertrophy; mucous membranes pink/moist, no erythema, no exudate. NECK: Supple; non-tender; no cervical lymphadenopathy  CARD: Normal S1, S2; no murmurs, rubs, or gallops. Regular rate and rhythm. RESP: Normal respiratory effort; breath sounds clear and equal bilaterally; no wheezes, rhonchi, or rales. ABD: Normal bowel sounds; non-distended; non-tender; no palpable organomegaly, no masses, no bruits. Back Exam: Normal inspection; L/S vertebral point tenderness, no CVA tenderness. Normal range of motion. EXT: Normal ROM in all four extremities; non-tender to palpation; no swelling or deformity; distal pulses are normal, no edema. SKIN: Warm; dry; no rash.   Terry Second and oriented x 3, coherent, DANA-XII grossly intact, sensory and motor are non-focal.        MDM  Number of Diagnoses or Management Options  Sciatica of left side  Diagnosis management comments:   Assessment: Back pain with sciatica rule out lumbar spine disease. The patient appears hemodynamically stable without any neurological deficit. Plan: X-ray of the lumbar spine/analgesia/serial exam/ Monitor and Reevaluate. Amount and/or Complexity of Data Reviewed  Clinical lab tests: reviewed and ordered  Tests in the radiology section of CPT®: ordered and reviewed  Tests in the medicine section of CPT®: ordered and reviewed  Discussion of test results with the performing providers: yes  Decide to obtain previous medical records or to obtain history from someone other than the patient: yes  Obtain history from someone other than the patient: yes  Review and summarize past medical records: yes  Discuss the patient with other providers: yes  Independent visualization of images, tracings, or specimens: yes    Risk of Complications, Morbidity, and/or Mortality  Presenting problems: moderate  Diagnostic procedures: moderate  Management options: moderate    Patient Progress  Patient progress: stable         Procedures       Progress Note:   Pt has been reexamined by Elvia Brannon MD. Pt is feeling much better. Symptoms have improved. All available results have been reviewed with pt and any available family. Pt understands sx, dx, and tx in ED. Care plan has been outlined and questions have been answered. Pt is ready to go home. Will send home on low back pain and sciatica instruction. Prescription of naproxen, Flexeril and Medrol Dosepak. . Outpatient referral with PCP as needed. Written by Elvia Brannon MD,5:53 AM    .   .

## 2021-04-29 NOTE — ED NOTES
Dr. Martina Lamar reviewed discharge instructions with the patient. The patient verbalized understanding. The patient was given opportunity for questions. Patient discharged in stable condition to the waiting room via ambulatory.

## 2021-04-29 NOTE — ED TRIAGE NOTES
Pt arrives to the ED with complaints of left sided pain that starts at the hip and \"shoots\" down to the thigh. Pain started on Sunday when she first woke up in the morning and pain has progressively gotten worse. Pt states she's noticed her left hand shaking. Pt ambulatory from waiting room to triage. Pt states she took Diclofenac 10mg at 1900 and states that she has had no improvement.

## 2021-05-10 RX ORDER — POLYETHYLENE GLYCOL 3350 17 G/17G
POWDER, FOR SOLUTION ORAL
Qty: 235 G | Refills: 4 | Status: SHIPPED | OUTPATIENT
Start: 2021-05-10 | End: 2021-08-18 | Stop reason: SDUPTHER

## 2021-08-18 ENCOUNTER — TELEPHONE (OUTPATIENT)
Dept: GYNECOLOGY | Age: 36
End: 2021-08-18

## 2021-08-18 RX ORDER — POLYETHYLENE GLYCOL 3350 17 G/17G
POWDER, FOR SOLUTION ORAL
Qty: 235 G | Refills: 4 | Status: SHIPPED | OUTPATIENT
Start: 2021-08-18

## 2021-08-18 NOTE — TELEPHONE ENCOUNTER
Patient would like a refill of Miralax. Per tiburcio from [facility] the below medication refill was sent to her pharmacy.   Requested Prescriptions     Pending Prescriptions Disp Refills    polyethylene glycol (MIRALAX) 17 gram/dose powder 235 g 4     Sig: DISSOLVE 17 GRAMS IN 8 OUNCES OF LIQUID AND DRINK ONCE A DAY

## 2021-08-18 NOTE — TELEPHONE ENCOUNTER
Pt would like you to call in a refill on the powder stool softner to  [facility] at [facility] can call her back at [phone]

## 2021-10-02 ENCOUNTER — HOSPITAL ENCOUNTER (INPATIENT)
Age: 36
LOS: 2 days | Discharge: HOME | DRG: 137 | End: 2021-10-05
Attending: EMERGENCY MEDICINE | Admitting: INTERNAL MEDICINE
Payer: MEDICAID

## 2021-10-02 ENCOUNTER — APPOINTMENT (OUTPATIENT)
Dept: GENERAL RADIOLOGY | Age: 36
DRG: 137 | End: 2021-10-02
Attending: EMERGENCY MEDICINE
Payer: MEDICAID

## 2021-10-02 DIAGNOSIS — R06.02 SOB (SHORTNESS OF BREATH): ICD-10-CM

## 2021-10-02 DIAGNOSIS — E87.6 HYPOKALEMIA: ICD-10-CM

## 2021-10-02 DIAGNOSIS — U07.1 COVID: Primary | ICD-10-CM

## 2021-10-02 DIAGNOSIS — R09.02 HYPOXIA: ICD-10-CM

## 2021-10-02 LAB
ALBUMIN SERPL-MCNC: 2.4 G/DL (ref 3.5–5)
ALBUMIN/GLOB SERPL: 0.4 {RATIO} (ref 1.1–2.2)
ALP SERPL-CCNC: 98 U/L (ref 45–117)
ALT SERPL-CCNC: 39 U/L (ref 12–78)
ANION GAP SERPL CALC-SCNC: 11 MMOL/L (ref 5–15)
AST SERPL-CCNC: 38 U/L (ref 15–37)
BASOPHILS # BLD: 0 K/UL (ref 0–0.1)
BASOPHILS NFR BLD: 0 % (ref 0–1)
BILIRUB SERPL-MCNC: 0.4 MG/DL (ref 0.2–1)
BUN SERPL-MCNC: 8 MG/DL (ref 6–20)
BUN/CREAT SERPL: 10 (ref 12–20)
CALCIUM SERPL-MCNC: 8.8 MG/DL (ref 8.5–10.1)
CHLORIDE SERPL-SCNC: 103 MMOL/L (ref 97–108)
CO2 SERPL-SCNC: 25 MMOL/L (ref 21–32)
CREAT SERPL-MCNC: 0.8 MG/DL (ref 0.55–1.02)
D DIMER PPP FEU-MCNC: 23.89 MG/L FEU (ref 0–0.65)
DIFFERENTIAL METHOD BLD: ABNORMAL
EOSINOPHIL # BLD: 0.1 K/UL (ref 0–0.4)
EOSINOPHIL NFR BLD: 2 % (ref 0–7)
ERYTHROCYTE [DISTWIDTH] IN BLOOD BY AUTOMATED COUNT: 18.6 % (ref 11.5–14.5)
GLOBULIN SER CALC-MCNC: 5.4 G/DL (ref 2–4)
GLUCOSE SERPL-MCNC: 92 MG/DL (ref 65–100)
HCT VFR BLD AUTO: 40.7 % (ref 35–47)
HGB BLD-MCNC: 13.7 G/DL (ref 11.5–16)
IMM GRANULOCYTES # BLD AUTO: 0 K/UL
IMM GRANULOCYTES NFR BLD AUTO: 0 %
LYMPHOCYTES # BLD: 1.2 K/UL (ref 0.8–3.5)
LYMPHOCYTES NFR BLD: 19 % (ref 12–49)
MCH RBC QN AUTO: 25.2 PG (ref 26–34)
MCHC RBC AUTO-ENTMCNC: 33.7 G/DL (ref 30–36.5)
MCV RBC AUTO: 75 FL (ref 80–99)
MONOCYTES # BLD: 0.6 K/UL (ref 0–1)
MONOCYTES NFR BLD: 9 % (ref 5–13)
NEUTS BAND NFR BLD MANUAL: 3 % (ref 0–6)
NEUTS SEG # BLD: 4.3 K/UL (ref 1.8–8)
NEUTS SEG NFR BLD: 67 % (ref 32–75)
NRBC # BLD: 0 K/UL (ref 0–0.01)
NRBC BLD-RTO: 0 PER 100 WBC
PLATELET # BLD AUTO: 305 K/UL (ref 150–400)
PMV BLD AUTO: 11.2 FL (ref 8.9–12.9)
POTASSIUM SERPL-SCNC: 3.4 MMOL/L (ref 3.5–5.1)
PROT SERPL-MCNC: 7.8 G/DL (ref 6.4–8.2)
RBC # BLD AUTO: 5.43 M/UL (ref 3.8–5.2)
RBC MORPH BLD: ABNORMAL
SODIUM SERPL-SCNC: 139 MMOL/L (ref 136–145)
WBC # BLD AUTO: 6.2 K/UL (ref 3.6–11)
WBC MORPH BLD: ABNORMAL

## 2021-10-02 PROCEDURE — 85025 COMPLETE CBC W/AUTO DIFF WBC: CPT

## 2021-10-02 PROCEDURE — 80053 COMPREHEN METABOLIC PANEL: CPT

## 2021-10-02 PROCEDURE — 85379 FIBRIN DEGRADATION QUANT: CPT

## 2021-10-02 PROCEDURE — 75810000455 HC PLCMT CENT VENOUS CATH LVL 2 5182

## 2021-10-02 PROCEDURE — 71045 X-RAY EXAM CHEST 1 VIEW: CPT

## 2021-10-02 PROCEDURE — 99284 EMERGENCY DEPT VISIT MOD MDM: CPT

## 2021-10-02 PROCEDURE — 36415 COLL VENOUS BLD VENIPUNCTURE: CPT

## 2021-10-02 PROCEDURE — 74011000250 HC RX REV CODE- 250: Performed by: EMERGENCY MEDICINE

## 2021-10-02 PROCEDURE — 02HV33Z INSERTION OF INFUSION DEVICE INTO SUPERIOR VENA CAVA, PERCUTANEOUS APPROACH: ICD-10-PCS | Performed by: EMERGENCY MEDICINE

## 2021-10-02 RX ORDER — IPRATROPIUM BROMIDE AND ALBUTEROL SULFATE 2.5; .5 MG/3ML; MG/3ML
3 SOLUTION RESPIRATORY (INHALATION)
Status: COMPLETED | OUTPATIENT
Start: 2021-10-02 | End: 2021-10-02

## 2021-10-02 RX ADMIN — IPRATROPIUM BROMIDE AND ALBUTEROL SULFATE 3 ML: .5; 3 SOLUTION RESPIRATORY (INHALATION) at 22:14

## 2021-10-02 NOTE — ED PROVIDER NOTES
Cheri Antonio is a 44 yo F with Cough and shortness of breath. She has had symptoms for the past week and was diagones with COVID and bilateral pneumonia 5 days ago. She was prescribed doxycycline. She states that for the past 2 days she has felt more short of breath and so came to the ED for evaluation. She has history of asthma and has been using her inhaler at home but it is not helping much.             Past Medical History:   Diagnosis Date    Asthma      delivery 2003    GERD (gastroesophageal reflux disease)     no medication    Morbid obesity ([address]     Sickle cell trait ([address]     Sickle cell trait ([address]        Past Surgical History:   Procedure Laterality Date    HX  SECTION      HX  SECTION      HX HEENT      TEETH EXTRACTION         Family History:   Problem Relation Age of Onset    Diabetes Mother     Hypertension Father     No Known Problems Sister     Hypertension Maternal Grandmother     No Known Problems Sister     Asthma Son     Asthma Daughter     Anesth Problems Neg Hx        Social History     Socioeconomic History    Marital status: SINGLE     Spouse name: Not on file    Number of children: Not on file    Years of education: Not on file    Highest education level: Not on file   Occupational History    Not on file   Tobacco Use    Smoking status: Current Some Day Smoker     Packs/day: 0.25     Years: 4.00     Pack years: 1.00    Smokeless tobacco: Never Used   Vaping Use    Vaping Use: Never used   Substance and Sexual Activity    Alcohol use: Yes     Comment: RARELY- DRINK WINE    Drug use: No    Sexual activity: Not on file   Other Topics Concern    Not on file   Social History Narrative    Not on file     Social Determinants of Health     Financial Resource Strain:     Difficulty of Paying Living Expenses:    Food Insecurity:     Worried About Running Out of Food in the Last Year:     Ran Out of Food in the Last Year:    Transportation Needs:     Lack of Transportation (Medical):  Lack of Transportation (Non-Medical):    Physical Activity:     Days of Exercise per Week:     Minutes of Exercise per Session:    Stress:     Feeling of Stress :    Social Connections:     Frequency of Communication with Friends and Family:     Frequency of Social Gatherings with Friends and Family:     Attends Faith Services:     Active Member of Clubs or Organizations:     Attends Club or Organization Meetings:     Marital Status:    Intimate Partner Violence:     Fear of Current or Ex-Partner:     Emotionally Abused:     Physically Abused:     Sexually Abused: ALLERGIES: Codeine and Tylenol [acetaminophen]    Review of Systems   Constitutional: Negative for fever. HENT: Negative for sore throat. Eyes: Negative for visual disturbance. Respiratory: Positive for cough and shortness of breath. Cardiovascular: Negative for chest pain. Gastrointestinal: Negative for abdominal pain. Genitourinary: Negative for dysuria. Musculoskeletal: Negative for back pain. Skin: Negative for rash. Neurological: Negative for headaches. Vitals:    10/02/21 1936   BP: (!) 134/95   Pulse: 84   Resp: 22   Temp: 98 °F (36.7 °C)   SpO2: 95%            Physical Exam  Vitals and nursing note reviewed. Constitutional:       General: She is not in acute distress. Appearance: She is well-developed. She is obese. HENT:      Head: Normocephalic and atraumatic. Eyes:      Conjunctiva/sclera: Conjunctivae normal.   Neck:      Trachea: Phonation normal.   Cardiovascular:      Rate and Rhythm: Normal rate. Pulmonary:      Effort: Pulmonary effort is normal. No respiratory distress. Breath sounds: Wheezing and rhonchi present. Abdominal:      General: There is no distension. Musculoskeletal:         General: No tenderness. Normal range of motion. Cervical back: Normal range of motion.    Skin: General: Skin is warm and dry. Neurological:      Mental Status: She is alert. She is not disoriented. Motor: No abnormal muscle tone. MDM       Central Line    Date/Time: 10/2/2021 11:30 PM  Performed by: Janki Beck MD  Authorized by: Janki Beck MD     Consent:     Consent obtained:  Written    Consent given by:  Patient    Risks discussed:  Arterial puncture, nerve damage, pneumothorax, infection, bleeding and incorrect placement  Pre-procedure details:     Hand hygiene: Hand hygiene performed prior to insertion      Sterile barrier technique: All elements of maximal sterile technique followed      Skin preparation:  2% chlorhexidine    Skin preparation agent: Skin preparation agent completely dried prior to procedure    Anesthesia (see MAR for exact dosages): Anesthesia method:  Local infiltration    Local anesthetic:  Lidocaine 1% w/o epi  Procedure details:     Location:  R internal jugular    Site selection rationale:  Infection risk, US guidence    Patient position:  Flat    Procedural supplies:  Triple lumen    Catheter size:  7 Fr    Landmarks identified: yes      Ultrasound guidance: yes      Sterile ultrasound techniques: Sterile gel and sterile probe covers were used      Number of attempts:  1    Successful placement: yes    Post-procedure details:     Post-procedure:  Dressing applied (stat-lock secured)    Assessment:  Blood return through all ports and placement verified by x-ray    Patient tolerance of procedure: Tolerated well, no immediate complications      36:00 AM  PAtient with nausea and vomiting after CTA. Denies itching or increased shortness of breath. Zofran ordered. Perfect Serve Consult for Admission  1:52 AM    ED Room Number: [facility]  Patient Name and age:  Woody Sport 43 y.o.  female  Working Diagnosis:   1. COVID    2. SOB (shortness of breath)    3.  Hypoxia        COVID-19 Suspicion:  yes  Sepsis present:  no  Reassessment needed: N/A  Code Status:  Full Code  Readmission: no  Isolation Requirements:  yes  Recommended Level of Care:  telemetry  Department:[facility] - [phone]  Other:  Diagnosed with COVID by PCP on Monday. Has been taking doxycycline for PNA. Today developed increased shortness of breath. ddimer 23.89 but CTA chest negative for PE. Patient's  O2 sat 94% at rest but desaturates to 82% with ambulation. Has central line placed due to difficult peripheral access. 3:22 AM  Awaiting [facility] for transport to [facility].   ETA 4:15am

## 2021-10-03 ENCOUNTER — APPOINTMENT (OUTPATIENT)
Dept: CT IMAGING | Age: 36
DRG: 137 | End: 2021-10-03
Attending: EMERGENCY MEDICINE
Payer: MEDICAID

## 2021-10-03 ENCOUNTER — APPOINTMENT (OUTPATIENT)
Dept: ULTRASOUND IMAGING | Age: 36
DRG: 137 | End: 2021-10-03
Attending: INTERNAL MEDICINE
Payer: MEDICAID

## 2021-10-03 PROBLEM — N93.8 DUB (DYSFUNCTIONAL UTERINE BLEEDING): Status: RESOLVED | Noted: 2020-07-27 | Resolved: 2021-10-03

## 2021-10-03 PROBLEM — E53.8 LOW FOLATE: Status: RESOLVED | Noted: 2020-06-10 | Resolved: 2021-10-03

## 2021-10-03 PROBLEM — N92.0 MENORRHAGIA WITH REGULAR CYCLE: Status: RESOLVED | Noted: 2020-06-11 | Resolved: 2021-10-03

## 2021-10-03 PROBLEM — U07.1 PNEUMONIA DUE TO COVID-19 VIRUS: Status: ACTIVE | Noted: 2021-10-03

## 2021-10-03 PROBLEM — J96.01 ACUTE HYPOXEMIC RESPIRATORY FAILURE (HCC): Status: ACTIVE | Noted: 2021-10-03

## 2021-10-03 PROBLEM — N85.8 UTERINE MASS: Status: RESOLVED | Noted: 2020-06-09 | Resolved: 2021-10-03

## 2021-10-03 PROBLEM — R51.9 FACE PAIN: Status: RESOLVED | Noted: 2021-04-25 | Resolved: 2021-10-03

## 2021-10-03 PROBLEM — T14.8XXA BITE: Status: RESOLVED | Noted: 2021-04-25 | Resolved: 2021-10-03

## 2021-10-03 PROBLEM — N10 PYELONEPHRITIS, ACUTE: Status: RESOLVED | Noted: 2020-06-09 | Resolved: 2021-10-03

## 2021-10-03 PROBLEM — J12.82 PNEUMONIA DUE TO COVID-19 VIRUS: Status: ACTIVE | Noted: 2021-10-03

## 2021-10-03 PROBLEM — D72.829 LEUKOCYTOSIS: Status: RESOLVED | Noted: 2020-06-09 | Resolved: 2021-10-03

## 2021-10-03 PROBLEM — D64.9 ANEMIA: Status: RESOLVED | Noted: 2020-06-09 | Resolved: 2021-10-03

## 2021-10-03 PROBLEM — D25.9 FIBROID UTERUS: Status: RESOLVED | Noted: 2020-07-27 | Resolved: 2021-10-03

## 2021-10-03 PROBLEM — D50.9 IRON DEFICIENCY ANEMIA: Status: RESOLVED | Noted: 2020-06-10 | Resolved: 2021-10-03

## 2021-10-03 LAB
AMPHET UR QL SCN: NEGATIVE
APPEARANCE UR: CLEAR
BACTERIA URNS QL MICRO: NEGATIVE /HPF
BARBITURATES UR QL SCN: NEGATIVE
BENZODIAZ UR QL: NEGATIVE
BILIRUB UR QL: NEGATIVE
CANNABINOIDS UR QL SCN: NEGATIVE
COCAINE UR QL SCN: NEGATIVE
COLOR UR: ABNORMAL
COMMENT, HOLDF: NORMAL
CREAT SERPL-MCNC: 0.79 MG/DL (ref 0.55–1.02)
CRP SERPL-MCNC: 14 MG/DL (ref 0–0.6)
DRUG SCRN COMMENT,DRGCM: NORMAL
EPITH CASTS URNS QL MICRO: ABNORMAL /LPF
ETHANOL SERPL-MCNC: <10 MG/DL
GLUCOSE UR STRIP.AUTO-MCNC: NEGATIVE MG/DL
HGB UR QL STRIP: NEGATIVE
KETONES UR QL STRIP.AUTO: ABNORMAL MG/DL
LEUKOCYTE ESTERASE UR QL STRIP.AUTO: NEGATIVE
METHADONE UR QL: NEGATIVE
NITRITE UR QL STRIP.AUTO: NEGATIVE
OPIATES UR QL: NEGATIVE
PCP UR QL: NEGATIVE
PH UR STRIP: 6.5 [PH] (ref 5–8)
PROCALCITONIN SERPL-MCNC: <0.05 NG/ML
PROT UR STRIP-MCNC: 100 MG/DL
RBC #/AREA URNS HPF: ABNORMAL /HPF (ref 0–5)
SAMPLES BEING HELD,HOLD: NORMAL
SP GR UR REFRACTOMETRY: 1.01 (ref 1–1.03)
UROBILINOGEN UR QL STRIP.AUTO: 2 EU/DL (ref 0.2–1)
WBC URNS QL MICRO: ABNORMAL /HPF (ref 0–4)

## 2021-10-03 PROCEDURE — 74011250636 HC RX REV CODE- 250/636: Performed by: INTERNAL MEDICINE

## 2021-10-03 PROCEDURE — 74011250636 HC RX REV CODE- 250/636: Performed by: EMERGENCY MEDICINE

## 2021-10-03 PROCEDURE — 36415 COLL VENOUS BLD VENIPUNCTURE: CPT

## 2021-10-03 PROCEDURE — 74011000636 HC RX REV CODE- 636: Performed by: EMERGENCY MEDICINE

## 2021-10-03 PROCEDURE — 71275 CT ANGIOGRAPHY CHEST: CPT

## 2021-10-03 PROCEDURE — 82077 ASSAY SPEC XCP UR&BREATH IA: CPT

## 2021-10-03 PROCEDURE — 87086 URINE CULTURE/COLONY COUNT: CPT

## 2021-10-03 PROCEDURE — 80307 DRUG TEST PRSMV CHEM ANLYZR: CPT

## 2021-10-03 PROCEDURE — 84145 PROCALCITONIN (PCT): CPT

## 2021-10-03 PROCEDURE — 96374 THER/PROPH/DIAG INJ IV PUSH: CPT

## 2021-10-03 PROCEDURE — XW0DXM6 INTRODUCTION OF BARICITINIB INTO MOUTH AND PHARYNX, EXTERNAL APPROACH, NEW TECHNOLOGY GROUP 6: ICD-10-PCS | Performed by: INTERNAL MEDICINE

## 2021-10-03 PROCEDURE — 82565 ASSAY OF CREATININE: CPT

## 2021-10-03 PROCEDURE — 81001 URINALYSIS AUTO W/SCOPE: CPT

## 2021-10-03 PROCEDURE — 74011250637 HC RX REV CODE- 250/637: Performed by: INTERNAL MEDICINE

## 2021-10-03 PROCEDURE — 86140 C-REACTIVE PROTEIN: CPT

## 2021-10-03 PROCEDURE — 65270000029 HC RM PRIVATE

## 2021-10-03 RX ORDER — DEXAMETHASONE SODIUM PHOSPHATE 10 MG/ML
6 INJECTION INTRAMUSCULAR; INTRAVENOUS ONCE
Status: COMPLETED | OUTPATIENT
Start: 2021-10-03 | End: 2021-10-03

## 2021-10-03 RX ORDER — POTASSIUM CHLORIDE AND SODIUM CHLORIDE 450; 150 MG/100ML; MG/100ML
INJECTION, SOLUTION INTRAVENOUS CONTINUOUS
Status: DISCONTINUED | OUTPATIENT
Start: 2021-10-03 | End: 2021-10-05 | Stop reason: HOSPADM

## 2021-10-03 RX ORDER — ONDANSETRON 2 MG/ML
4 INJECTION INTRAMUSCULAR; INTRAVENOUS
Status: CANCELLED | OUTPATIENT
Start: 2021-10-03

## 2021-10-03 RX ORDER — ACETAMINOPHEN 500 MG
2000 TABLET ORAL DAILY
Qty: 30 CAPSULE | Refills: 0 | Status: SHIPPED | OUTPATIENT
Start: 2021-10-03

## 2021-10-03 RX ORDER — ONDANSETRON 2 MG/ML
4 INJECTION INTRAMUSCULAR; INTRAVENOUS
Status: COMPLETED | OUTPATIENT
Start: 2021-10-03 | End: 2021-10-03

## 2021-10-03 RX ORDER — UREA 10 %
1 LOTION (ML) TOPICAL DAILY
Qty: 30 TABLET | Refills: 0 | Status: SHIPPED | OUTPATIENT
Start: 2021-10-03

## 2021-10-03 RX ORDER — SODIUM CHLORIDE 9 MG/ML
75 INJECTION, SOLUTION INTRAVENOUS CONTINUOUS
Status: CANCELLED | OUTPATIENT
Start: 2021-10-03

## 2021-10-03 RX ORDER — ENOXAPARIN SODIUM 100 MG/ML
40 INJECTION SUBCUTANEOUS DAILY
Status: CANCELLED | OUTPATIENT
Start: 2021-10-03

## 2021-10-03 RX ORDER — DEXAMETHASONE 6 MG/1
6 TABLET ORAL DAILY
Status: DISCONTINUED | OUTPATIENT
Start: 2021-10-04 | End: 2021-10-05 | Stop reason: HOSPADM

## 2021-10-03 RX ORDER — POLYETHYLENE GLYCOL 3350 17 G/17G
17 POWDER, FOR SOLUTION ORAL DAILY PRN
Status: DISCONTINUED | OUTPATIENT
Start: 2021-10-03 | End: 2021-10-05 | Stop reason: HOSPADM

## 2021-10-03 RX ORDER — ASCORBIC ACID 500 MG
500 TABLET ORAL DAILY
Status: DISCONTINUED | OUTPATIENT
Start: 2021-10-03 | End: 2021-10-05 | Stop reason: HOSPADM

## 2021-10-03 RX ORDER — ASCORBIC ACID 500 MG
500 TABLET ORAL 2 TIMES DAILY
Qty: 60 TABLET | Refills: 0 | Status: SHIPPED | OUTPATIENT
Start: 2021-10-03 | End: 2021-11-02

## 2021-10-03 RX ORDER — ENOXAPARIN SODIUM 150 MG/ML
1 INJECTION SUBCUTANEOUS EVERY 12 HOURS
Status: DISCONTINUED | OUTPATIENT
Start: 2021-10-03 | End: 2021-10-05 | Stop reason: HOSPADM

## 2021-10-03 RX ORDER — ACETAMINOPHEN 650 MG/1
650 SUPPOSITORY RECTAL
Status: CANCELLED | OUTPATIENT
Start: 2021-10-03

## 2021-10-03 RX ORDER — ONDANSETRON 4 MG/1
4 TABLET, ORALLY DISINTEGRATING ORAL
Status: CANCELLED | OUTPATIENT
Start: 2021-10-03

## 2021-10-03 RX ORDER — ACETAMINOPHEN 325 MG/1
650 TABLET ORAL
Status: CANCELLED | OUTPATIENT
Start: 2021-10-03

## 2021-10-03 RX ORDER — ZINC GLUCONATE 50 MG
50 TABLET ORAL DAILY
Status: DISCONTINUED | OUTPATIENT
Start: 2021-10-03 | End: 2021-10-05 | Stop reason: HOSPADM

## 2021-10-03 RX ORDER — MELATONIN
1000 DAILY
Status: DISCONTINUED | OUTPATIENT
Start: 2021-10-03 | End: 2021-10-05 | Stop reason: HOSPADM

## 2021-10-03 RX ADMIN — IOPAMIDOL 100 ML: 755 INJECTION, SOLUTION INTRAVENOUS at 00:26

## 2021-10-03 RX ADMIN — SODIUM CHLORIDE 1000 ML: 9 INJECTION, SOLUTION INTRAVENOUS at 00:59

## 2021-10-03 RX ADMIN — SODIUM CHLORIDE AND POTASSIUM CHLORIDE: 4.5; 1.49 INJECTION, SOLUTION INTRAVENOUS at 21:46

## 2021-10-03 RX ADMIN — ENOXAPARIN SODIUM 120 MG: 120 INJECTION SUBCUTANEOUS at 05:14

## 2021-10-03 RX ADMIN — BARICITINIB 4 MG: 2 TABLET, FILM COATED ORAL at 06:46

## 2021-10-03 RX ADMIN — DEXAMETHASONE SODIUM PHOSPHATE 6 MG: 10 INJECTION, SOLUTION INTRAMUSCULAR; INTRAVENOUS at 02:31

## 2021-10-03 RX ADMIN — ENOXAPARIN SODIUM 120 MG: 120 INJECTION SUBCUTANEOUS at 16:02

## 2021-10-03 RX ADMIN — Medication 1000 UNITS: at 08:44

## 2021-10-03 RX ADMIN — OXYCODONE HYDROCHLORIDE AND ACETAMINOPHEN 500 MG: 500 TABLET ORAL at 08:44

## 2021-10-03 RX ADMIN — SODIUM CHLORIDE AND POTASSIUM CHLORIDE: 4.5; 1.49 INJECTION, SOLUTION INTRAVENOUS at 05:12

## 2021-10-03 RX ADMIN — Medication 50 MG: at 08:44

## 2021-10-03 RX ADMIN — ONDANSETRON 4 MG: 2 INJECTION INTRAMUSCULAR; INTRAVENOUS at 00:59

## 2021-10-03 NOTE — ED NOTES
Pt ambulated in room with SpO2 monitoring, desats from 91% to 82% on room air. MD aware. Plan to admit pt to [facility].

## 2021-10-03 NOTE — H&P
[facility]    Hospitalist Admission Note      NAME:  Lena Leal   :   1985   MRN:  [id number]     PCP:  Lety Becker MD     Date/Time of service:  10/3/2021 2:23 AM          Subjective:     CHIEF COMPLAINT: dyspnea     HISTORY OF PRESENT ILLNESS:     Ms. Kvng Cox is a 43 y.o.  female who presented to the Emergency Department complaining of dypsnea. Worsening over days. Tested positive for COVID 5 days ago. ER finds hypoxia with activity and elevated DDimer. We will admit her for management. Past Medical History:   Diagnosis Date    Asthma      delivery 2003    GERD (gastroesophageal reflux disease)     no medication    [facility]bid obesity ([facility])     Sickle cell trait ([facility])         Past Surgical History:   Procedure Laterality Date    HX  SECTION      HX  SECTION      HX HEENT      TEETH EXTRACTION       Social History     Tobacco Use    Smoking status: Current Some Day Smoker     Packs/day: 0.25     Years: 4.00     Pack years: 1.00    Smokeless tobacco: Never Used   Substance Use Topics    Alcohol use: Yes     Comment: RARELY- DRINK WINE        Family History   Problem Relation Age of Onset    Diabetes Mother     Hypertension Father     No Known Problems Sister     Hypertension Maternal Grandmother     No Known Problems Sister     Asthma Son     Asthma Daughter     Anesth Problems Neg Hx       Family hx cannot be fully assessed, since the patient cannot provide information    Allergies   Allergen Reactions    Codeine Hives and Rash    Tylenol [Acetaminophen] Itching        Prior to Admission medications    Medication Sig Start Date End Date Taking? Authorizing Provider   ascorbic acid, vitamin C, (Vitamin C) 500 mg tablet Take 1 Tablet by mouth two (2) times a day for 30 days.  10/3/21 11/2/21 Yes Wilbert Zuniga MD   cholecalciferol (Vitamin D3) (2,000 UNITS /50 MCG) cap capsule Take 1 Capsule by mouth daily. 10/3/21  Yes Saeid Ziegler MD   zinc sulfate 50 mg zinc (220 mg) tablet Take 1 Tablet by mouth daily. 10/3/21  Yes Saeid Ziegler MD   polyethylene glycol (MIRALAX) 17 gram/dose powder DISSOLVE 17 GRAMS IN 8 OUNCES OF LIQUID AND DRINK ONCE A DAY 8/18/21   Eve Enriquez MD   PNV No12-Iron-FA-DSS-OM-3 29 mg iron-1 mg -50 mg CPKD Take 1 Tab by mouth every morning.     Provider, Historical       Review of Systems:  (bold if positive, if negative)    Gen:  Eyes:  ENT:  CVS:  Pulm:  Cough, dyspneaGI:  :  MS:  Skin:  Psych:  Endo:  Hem:  Renal:  Neuro:        Objective:      VITALS:    Vital signs reviewed; most recent are:    Visit Vitals  /84   Pulse 84   Temp 98 °F (36.7 °C)   Resp 22   Ht 5' 5\" (1.651 m)   Wt 122.2 kg (269 lb 6.4 oz)   SpO2 95%   BMI 44.83 kg/m²     SpO2 Readings from Last 6 Encounters:   10/02/21 95%   04/29/21 99%   04/19/21 100%   12/11/20 99%   07/29/20 99%   06/10/20 99%        No intake or output data in the 24 hours ending 10/03/21 0223     Exam:     Physical Exam:    Gen:  Morbid obese, in no acute distress  HEENT:  Pink conjunctivae, PERRL, hearing intact to voice, moist mucous membranes  Neck:  Supple, without masses, thyroid non-tender  Resp:  No accessory muscle use, distant breath sounds without wheezes rales or rhonchi  Card:  No murmurs, distant S1, S2 without thrills, bruits or peripheral edema  Abd:  Soft, non-tender, non-distended, distant bowel sounds are present, no mass  Lymph:  No cervical or inguinal adenopathy  Musc:  No cyanosis or clubbing  Skin:  No rashes or ulcers, skin turgor is good  Neuro:  Cranial nerves are grossly intact, no focal motor weakness, follows commands appropriately  Psych:  Poor insight, oriented to person, place and time, alert     Labs:    Recent Labs     10/02/21  2050   WBC 6.2   HGB 13.7   HCT 40.7        Recent Labs     10/02/21  2050      K 3.4*      CO2 25   GLU 92   BUN 8   CREA 0.80   CA 8.8 ALB 2.4*   TBILI 0.4   ALT 39     No results found for: GLUCPOC  No results for input(s): PH, PCO2, PO2, HCO3, FIO2 in the last 72 hours. No results for input(s): INR, INREXT in the last 72 hours. All Micro Results     Procedure Component Value Units Date/Time    CULTURE, URINE [[id number]]     Order Status: Sent Specimen: Urine from Clean catch           I have reviewed previous records       Assessment and Plan:      Pneumonia due to COVID-19 virus - POA, moderate case. Start decadron, remdesivir, vit C, D and Zn. Check Consult pulmonary if worsens. Trend crp, procalcitonin and DDimer. Acute hypoxemic respiratory failure - POA due to covid and likely underlying hypoventilation. Oxygen as needed    DDimer elevated - CTA without PE, but high risk. Check LE doppler. Start treatment dose of lovenox    Morbid obesity - Advise weight loss and outpatient BESSY testing. Likely has hypoventilation syndrome    Sickle cell trait / hx iron deficiency anemia - Hb normal.     Asthma - I do not think an exacerbation. Prn combivent if she starts wheezing    GERD (gastroesophageal reflux disease) - Add PPI while on steroids       Telemetry reviewed:   normal sinus rhythm    Risk of deterioration: high      Total time spent with patient: 48 Minutes I personally reviewed chart, notes, data and current medications in the medical record. I have personally examined and treated the patient at bedside during this period.                  Care Plan discussed with: Patient, Nursing Staff and >50% of time spent in counseling and coordination of care    Discussed:  Care Plan       ___________________________________________________    Attending Physician: Kev Phelps MD

## 2021-10-03 NOTE — PROGRESS NOTES
Bedside, Verbal and Written shift change report given to Rome Mracos RN (oncoming nurse) by Georgia Guillen (offgoing nurse). Report included the following information SBAR, Kardex, ED Summary, Procedure Summary, Intake/Output, MAR, Recent Results and Med Rec Status.

## 2021-10-03 NOTE — PROGRESS NOTES
Baricitinib Initiation Note    This patient meets criteria for initiation of baricitinib based on the following:  Confirmed COVID-19 (+) and hospitalized  Requiring oxygen support - specific O2 saturation is not a determinant for baricitinib  Elevated inflammatory markers (CRP, D-dimer, LDH, or ferritin). Concomitant therapy with dexamethasone 6-20 mg IV/PO daily (or equivalent steroid)  Do not give if patient has already received tocilizumab for COVID-19 treatment due to long half-life of tocilizumab (16 days)     Prescribers should weigh risks/benefits before initiating therapy in patients with the following:   Pregnancy  Severe hepatic impairment  Chronic/recurrent infections  Hemoglobin <8.0 g/dL   History/current thrombosis    Plan:  Consult for remdesivir by Dr. Júnior Bray. Patient has rapidly increasing O2 requirements and a d-dimer of 23.89. CRP is pending. Made recommendation for baricitinib instead as there are no apparent contraindications. MD agrees so will proceed with baricitinib dosing per pharmacy. - Baricitinib 4 mg po daily X 14 days or until hospital discharge, whichever is sooner, has been ordered. Dose has been adjusted for renal function. (CrCl > 100 mL/min)  - Renal function will be monitored daily while on baricitinib. (ordered daily x 14 days)  -VTE prophylaxis is recommended unless contraindicated. -Daily steroids have been ordered    Thank you,  Alfredo WORLEY [facility]

## 2021-10-03 NOTE — PROGRESS NOTES
Problem: Risk for Spread of Infection  Goal: Prevent transmission of infectious organism to others  Description: Prevent the transmission of infectious organisms to other patients, staff members, and visitors.   10/3/2021 0530 by Vannessa Quezada  Outcome: Progressing Towards Goal  10/3/2021 0530 by Vannessa Quezada  Outcome: Progressing Towards Goal

## 2021-10-03 NOTE — ED NOTES
Notified Dr. Augustus Vargas about failed IV attempts for CTA, ultrasound guided attempt also unsuccessful. Triple lumen central line inserted by MD with pt's consent. Nebulizer tx completed, pt verbalized improved breathing. Pt taken to CT.

## 2021-10-03 NOTE — ED NOTES
TRANSFER - OUT REPORT:    Verbal report given to Stef Yepez RN on [facility]  being transferred to [facility] Rm 536 for routine progression of care       Report consisted of patients Situation, Background, Assessment and   Recommendations(SBAR). Information from the following report(s) Kardex was reviewed with the receiving nurse. Lines:   Venous Access Device Triple Lumen 10/02/21 Other (comment) (Active)       Peripheral IV 10/02/21 Left Hand (Active)        Opportunity for questions and clarification was provided. Patient transported with:  PIV 22g to [facility] and central line to Right IJ triple lumen.

## 2021-10-03 NOTE — PROGRESS NOTES
Bedside shift change report given to Vannessa (oncoming nurse) by Tea Hennessy RN (offgoing nurse). Report included the following information SBAR, MAR, Kardex, I/O, Vitals.

## 2021-10-04 ENCOUNTER — APPOINTMENT (OUTPATIENT)
Dept: VASCULAR SURGERY | Age: 36
DRG: 137 | End: 2021-10-04
Attending: INTERNAL MEDICINE
Payer: MEDICAID

## 2021-10-04 LAB
ALBUMIN SERPL-MCNC: 2.3 G/DL (ref 3.5–5)
ALBUMIN/GLOB SERPL: 0.5 {RATIO} (ref 1.1–2.2)
ALP SERPL-CCNC: 86 U/L (ref 45–117)
ALT SERPL-CCNC: 45 U/L (ref 12–78)
ANION GAP SERPL CALC-SCNC: 7 MMOL/L (ref 5–15)
AST SERPL-CCNC: 39 U/L (ref 15–37)
BACTERIA SPEC CULT: NORMAL
BILIRUB SERPL-MCNC: 0.3 MG/DL (ref 0.2–1)
BUN SERPL-MCNC: 12 MG/DL (ref 6–20)
BUN/CREAT SERPL: 18 (ref 12–20)
CALCIUM SERPL-MCNC: 8.4 MG/DL (ref 8.5–10.1)
CHLORIDE SERPL-SCNC: 108 MMOL/L (ref 97–108)
CO2 SERPL-SCNC: 26 MMOL/L (ref 21–32)
CREAT SERPL-MCNC: 0.67 MG/DL (ref 0.55–1.02)
D DIMER PPP FEU-MCNC: 15.41 MG/L FEU (ref 0–0.65)
ERYTHROCYTE [DISTWIDTH] IN BLOOD BY AUTOMATED COUNT: 18.1 % (ref 11.5–14.5)
GLOBULIN SER CALC-MCNC: 5.1 G/DL (ref 2–4)
GLUCOSE SERPL-MCNC: 96 MG/DL (ref 65–100)
HCT VFR BLD AUTO: 35.8 % (ref 35–47)
HGB BLD-MCNC: 12.3 G/DL (ref 11.5–16)
MAGNESIUM SERPL-MCNC: 2.2 MG/DL (ref 1.6–2.4)
MCH RBC QN AUTO: 25.8 PG (ref 26–34)
MCHC RBC AUTO-ENTMCNC: 34.4 G/DL (ref 30–36.5)
MCV RBC AUTO: 75.2 FL (ref 80–99)
NRBC # BLD: 0 K/UL (ref 0–0.01)
NRBC BLD-RTO: 0 PER 100 WBC
PLATELET # BLD AUTO: 278 K/UL (ref 150–400)
PMV BLD AUTO: 10.5 FL (ref 8.9–12.9)
POTASSIUM SERPL-SCNC: 3.9 MMOL/L (ref 3.5–5.1)
PROT SERPL-MCNC: 7.4 G/DL (ref 6.4–8.2)
RBC # BLD AUTO: 4.76 M/UL (ref 3.8–5.2)
SERVICE CMNT-IMP: NORMAL
SODIUM SERPL-SCNC: 141 MMOL/L (ref 136–145)
WBC # BLD AUTO: 8.7 K/UL (ref 3.6–11)

## 2021-10-04 PROCEDURE — 83735 ASSAY OF MAGNESIUM: CPT

## 2021-10-04 PROCEDURE — 85027 COMPLETE CBC AUTOMATED: CPT

## 2021-10-04 PROCEDURE — 65270000029 HC RM PRIVATE

## 2021-10-04 PROCEDURE — 74011250636 HC RX REV CODE- 250/636: Performed by: INTERNAL MEDICINE

## 2021-10-04 PROCEDURE — 85379 FIBRIN DEGRADATION QUANT: CPT

## 2021-10-04 PROCEDURE — 80053 COMPREHEN METABOLIC PANEL: CPT

## 2021-10-04 PROCEDURE — 93970 EXTREMITY STUDY: CPT

## 2021-10-04 PROCEDURE — 74011250637 HC RX REV CODE- 250/637: Performed by: INTERNAL MEDICINE

## 2021-10-04 PROCEDURE — 36415 COLL VENOUS BLD VENIPUNCTURE: CPT

## 2021-10-04 RX ORDER — FLUCONAZOLE 100 MG/1
150 TABLET ORAL
Status: COMPLETED | OUTPATIENT
Start: 2021-10-04 | End: 2021-10-04

## 2021-10-04 RX ORDER — ONDANSETRON 4 MG/1
4 TABLET, ORALLY DISINTEGRATING ORAL
Status: DISCONTINUED | OUTPATIENT
Start: 2021-10-04 | End: 2021-10-05 | Stop reason: HOSPADM

## 2021-10-04 RX ADMIN — ONDANSETRON 4 MG: 4 TABLET, ORALLY DISINTEGRATING ORAL at 09:36

## 2021-10-04 RX ADMIN — Medication 50 MG: at 08:14

## 2021-10-04 RX ADMIN — FLUCONAZOLE 150 MG: 100 TABLET ORAL at 15:44

## 2021-10-04 RX ADMIN — OXYCODONE HYDROCHLORIDE AND ACETAMINOPHEN 500 MG: 500 TABLET ORAL at 08:13

## 2021-10-04 RX ADMIN — BARICITINIB 4 MG: 2 TABLET, FILM COATED ORAL at 08:13

## 2021-10-04 RX ADMIN — SODIUM CHLORIDE AND POTASSIUM CHLORIDE: 4.5; 1.49 INJECTION, SOLUTION INTRAVENOUS at 15:08

## 2021-10-04 RX ADMIN — Medication 1000 UNITS: at 08:14

## 2021-10-04 RX ADMIN — ENOXAPARIN SODIUM 120 MG: 120 INJECTION SUBCUTANEOUS at 15:08

## 2021-10-04 RX ADMIN — DEXAMETHASONE 6 MG: 6 TABLET ORAL at 08:13

## 2021-10-04 RX ADMIN — ENOXAPARIN SODIUM 120 MG: 120 INJECTION SUBCUTANEOUS at 03:39

## 2021-10-04 NOTE — PROGRESS NOTES
Pt c/o itching and thick vaginal discharge. Dr. Timur Quiñones notified- see orders. Diflucan administered per MAR. Will monitor.

## 2021-10-04 NOTE — PROGRESS NOTES
Bedside, Verbal and Written shift change report given to Andrea Foster RN  (oncoming nurse) by Cira Bowles (offgoing nurse). Report included the following information SBAR, Kardex, ED Summary, Procedure Summary, Intake/Output, MAR, Recent Results and Med Rec Status.

## 2021-10-04 NOTE — PROGRESS NOTES
10/4/2021  10:40 AM   COVID 19+  CM completed assessment w/ pt via phone, CM did not enter  the pt's room d/t Droplet Plus precautions  Charted demographics verified, pt lives w/ 25 and 15 yo sons in 2 story home, there are no entry steps and 13 interior steps to bed/bath. At baseline pt is ambulatory, independent w/ ADLs and drives, mother can assist if needed and is currently caring for her children  Reason for Admission:  Emergent for COVID 19 PNA  Pt is a 44 yo AAF who [facility] to [facility] c/o dyspnea after being diagnosed w/ COVID 5 days ago  PMHx: asthma, GERD, morbid obesity, sickle cell trait                   RUR Score:       11 % Low Risk of Readmission/Green              Plan for utilizing home health:      NO history, pt is independent w/ all ADLS at baseline  DME: NO Home O2, nebulizer  Rx; Medicaid, uses [id number] [facility].. Fully covered  COVID Vax Hx: Pfizer 1st dose September 2021    PCP: First and Last name:  Dr Delia Olson   Name of Practice: [facility]    Are you a current patient: Yes/No: Yes   Approximate date of last visit: > 30 days   Can you participate in a virtual visit with your PCP: Yes                    Current Advanced Directive/Advance Care Plan: Full Code  HCDM Antonia Hush [phone]    Healthcare Decision Maker:   Click here to complete [address] including selection of the Healthcare Decision Maker Relationship (ie \"Primary\")                             Transition of Care Plan:         COVID 19+           RUR 11 %  LOS 1 Day  1. Hospital admission for medical management  2. COVID 19 PNA, currently on RA, monitor for home O2 needs  3. ACP note completed, pt believes she completed ACP last year, to provide copy for EMR  4. DC when stable to home  5. Outpatient f/u PCP  6. [facility] resources  7. Family to transport at [address] [place] [address] Management Interventions  PCP Verified by CM:  Yes (Dr Delia Olson, last visit > 30 days)  Palliative Care Criteria Met (RRAT>21 & CHF Dx)?: No  Mode of Transport at Discharge: Self (Family)  Physical Therapy Consult: No  Occupational Therapy Consult: No  Speech Therapy Consult: No  Support Systems: Child(jericho) (pt lives w/ 25 and 15 yo children, at baseline pt si ambulatory, iADLs, drives )  Confirm Follow Up Transport: Family  Discharge Location  Discharge Placement: Home with outpatient services  RIC Caballero

## 2021-10-04 NOTE — PROGRESS NOTES
Bedside shift change report given to [address] [place] [address] (oncoming nurse) by Danita CALDERA (offgoing nurse). Report included the following information SBAR, Kardex, Procedure Summary, Intake/Output, MAR and Recent Results.

## 2021-10-04 NOTE — ACP (ADVANCE CARE PLANNING)
10/4/2021  10:01 AM  Advance Care Planning     Advance Care Planning Activator (Inpatient)  Conversation Note      Date of ACP Conversation: 10/04/21     Conversation Conducted with:  Patient with decision making capicity  ACP Activator: [address] Decision Maker:  HCDM Mother Peyton farris  Current Designated Health Care Decision Maker:     Click here to complete [employer] including selection of the Healthcare Decision Maker Relationship (ie \"Primary\")      Care Preferences    Ventilation: \"If you were in your present state of health and suddenly became very ill and were unable to breathe on your own, what would your preference be about the use of a ventilator (breathing machine) if it were available to you? \"  YES        \"If your health worsens and it becomes clear that your chance of recovery is unlikely, what would your preference be about the use of a ventilator (breathing machine) if it were available to you? \"   YES          Resuscitation  \"CPR works best to restart the heart when there is a sudden event, like a heart attack, in someone who is otherwise healthy. Unfortunately, CPR does not typically restart the heart for people who have serious health conditions or who are very sick. \"    \"In the event your heart stopped as a result of an underlying serious health condition, would you want attempts to be made to restart your heart  YES    [] Yes  [x] No   Educated Patient / Decision Maker regarding differences between Advance Directives and portable DNR orders.     Length of ACP Conversation in minutes:  5    Conversation Outcomes:  [x] ACP discussion completed  [] Existing advance directive reviewed with patient; no changes to patient's previously recorded wishes     [] New Advance Directive completed   [] Portable Do Not Resuscitate prepared for Provider review and signature  [] POLST/POST/MOLST/MOST prepared for Provider review and signature      Follow-up plan:    [] Schedule follow-up conversation to continue planning  [x] Referred individual to Provider for additional questions/concerns   [] Advised patient/agent/surrogate to review completed ACP document and update if needed with changes in condition, patient preferences or care setting     [] This note routed to one or more involved healthcare providers             ]RIC Yip

## 2021-10-04 NOTE — PROGRESS NOTES
Pt called and stated she was feeling nauseas. Dr. Vidal Dave notified- Sherri Mast ordered and administered per [facility]. Will monitor.

## 2021-10-04 NOTE — PROGRESS NOTES
Mitul Valladares [facility] [address]  [address] [facility], [address] [place] [address]  [phone]      Medical Progress Note      NAME: Roe Coelho   :  1985  MRM:  [id number]    Date/Time: 10/4/2021  9:25 AM           Assessment / Plan:     #AHRF 2/2 COVID-19: Has only required oxygen with ambulation despite quite concerning dimer and CRP levels. High risk for deterioration based on these and agree with having started Dex/antoine. Very hesitant to discharge home today and it is in her best interest to be observed at least one more day to better ascertain trajectory   - Dexamethasone 10/3 -    - Baricitinib 10/3 -    - LMWH 1mg/kg    - LE Dopplers today     #Morbid obesity - Advise weight loss and outpatient BESSY testing. Likely has hypoventilation syndrome     #Sickle cell trait / hx iron deficiency anemia - Hb normal.      #Asthma - I do not think an exacerbation. Prn combivent if she starts wheezing     #GERD (gastroesophageal reflux disease) - Add PPI while on steroids                     Care Plan discussed with: Patient    Discussed:  Care Plan    Prophylaxis:  Lovenox    Disposition:  Home w/Family           ___________________________________________________    Attending Physician: Shabana Moreno MD        Subjective:     Chief Complaint:  [place]. Feels better today. Wonders when her breathing will get better. Cough is mild    ROS:  (bold if positive, if negative)    Tolerating PT  Tolerating Diet          Objective:       Vitals:          Last 24hrs VS reviewed since prior progress note.  Most recent are:    Visit Vitals  BP (!) 153/88 (BP 1 Location: Left upper arm, BP Patient Position: At rest)   Pulse 70   Temp 98.2 °F (36.8 °C)   Resp 20   Ht 5' 5\" (1.651 m)   Wt 122.2 kg (269 lb 6.4 oz)   SpO2 97%   BMI 44.83 kg/m²     SpO2 Readings from Last 6 Encounters:   10/04/21 97%   21 99%   21 100%   20 99%   20 99%   06/10/20 99%        No intake or output data in the 24 hours ending 10/04/21 0925       Exam:     Physical Exam:    Gen:  Well-developed, well-nourished, in no acute distress  HEENT:  Pink conjunctivae, PERRL, hearing intact to voice, moist mucous membranes  Neck:  Supple, without masses, thyroid non-tender  Resp:  No accessory muscle use, clear breath sounds without wheezes rales or rhonchi  Card:  No murmurs, normal S1, S2 without thrills, bruits or peripheral edema  Abd:  Soft, non-tender, non-distended, normoactive bowel sounds are present  Musc:  No cyanosis or clubbing  Skin:  No rashes or ulcers, skin turgor is good  Neuro:  Cranial nerves 3-12 are grossly intact,  strength is 5/5 bilaterally and dorsi / plantarflexion is 5/5 bilaterally, follows commands appropriately  Psych:  Good insight, oriented to person, place and time, alert         Medications Reviewed: (see below)    Lab Data Reviewed: (see below)    ______________________________________________________________________    Medications:     Current Facility-Administered Medications   Medication Dose Route Frequency    polyethylene glycol (MIRALAX) packet 17 g  17 g Oral DAILY PRN    ascorbic acid (vitamin C) (VITAMIN C) tablet 500 mg  500 mg Oral DAILY    cholecalciferol (VITAMIN D3) (1000 Units /25 mcg) tablet 1,000 Units  1,000 Units Oral DAILY    zinc gluconate tablet 50 mg  50 mg Oral DAILY    enoxaparin (LOVENOX) injection 120 mg  1 mg/kg SubCUTAneous Q12H    0.45% sodium chloride with KCl 20 mEq/L infusion   IntraVENous CONTINUOUS    baricitinib (OLUMIANT) tablet 4 mg  4 mg Oral DAILY    dexAMETHasone (DECADRON) tablet 6 mg  6 mg Oral DAILY            Lab Review:     Recent Labs     10/04/21  0340 10/02/21  2050   WBC 8.7 6.2   HGB 12.3 13.7   HCT 35.8 40.7    305     Recent Labs     10/04/21  0340 10/03/21  0600 10/02/21  2050     --  139   K 3.9  --  3.4*     --  103   CO2 26  --  25   GLU 96  --  92   BUN 12  --  8   CREA 0.67 0.79 0.80   CA 8.4*  --  8.8 MG 2.2  --   --    ALB 2.3*  --  2.4*   ALT 45  --  39     No components found for: [place]

## 2021-10-05 VITALS
WEIGHT: 269.4 LBS | HEIGHT: 65 IN | DIASTOLIC BLOOD PRESSURE: 87 MMHG | SYSTOLIC BLOOD PRESSURE: 147 MMHG | BODY MASS INDEX: 44.89 KG/M2 | RESPIRATION RATE: 18 BRPM | TEMPERATURE: 98.4 F | HEART RATE: 65 BPM | OXYGEN SATURATION: 98 %

## 2021-10-05 LAB
ALBUMIN SERPL-MCNC: 2.4 G/DL (ref 3.5–5)
ALBUMIN/GLOB SERPL: 0.5 {RATIO} (ref 1.1–2.2)
ALP SERPL-CCNC: 78 U/L (ref 45–117)
ALT SERPL-CCNC: 55 U/L (ref 12–78)
ANION GAP SERPL CALC-SCNC: 7 MMOL/L (ref 5–15)
AST SERPL-CCNC: 38 U/L (ref 15–37)
BASOPHILS # BLD: 0.1 K/UL (ref 0–0.1)
BASOPHILS NFR BLD: 1 % (ref 0–1)
BILIRUB SERPL-MCNC: 0.3 MG/DL (ref 0.2–1)
BUN SERPL-MCNC: 16 MG/DL (ref 6–20)
BUN/CREAT SERPL: 22 (ref 12–20)
CALCIUM SERPL-MCNC: 8 MG/DL (ref 8.5–10.1)
CHLORIDE SERPL-SCNC: 107 MMOL/L (ref 97–108)
CO2 SERPL-SCNC: 25 MMOL/L (ref 21–32)
CREAT SERPL-MCNC: 0.73 MG/DL (ref 0.55–1.02)
CRP SERPL-MCNC: 3.2 MG/DL (ref 0–0.6)
D DIMER PPP FEU-MCNC: 9.03 MG/L FEU (ref 0–0.65)
DIFFERENTIAL METHOD BLD: ABNORMAL
EOSINOPHIL # BLD: 0.1 K/UL (ref 0–0.4)
EOSINOPHIL NFR BLD: 1 % (ref 0–7)
ERYTHROCYTE [DISTWIDTH] IN BLOOD BY AUTOMATED COUNT: 18.2 % (ref 11.5–14.5)
FERRITIN SERPL-MCNC: 184 NG/ML (ref 8–252)
GLOBULIN SER CALC-MCNC: 4.8 G/DL (ref 2–4)
GLUCOSE SERPL-MCNC: 94 MG/DL (ref 65–100)
HCT VFR BLD AUTO: 36.4 % (ref 35–47)
HGB BLD-MCNC: 12.3 G/DL (ref 11.5–16)
IMM GRANULOCYTES # BLD AUTO: 0 K/UL
IMM GRANULOCYTES NFR BLD AUTO: 0 %
LYMPHOCYTES # BLD: 2.9 K/UL (ref 0.8–3.5)
LYMPHOCYTES NFR BLD: 27 % (ref 12–49)
MCH RBC QN AUTO: 25.5 PG (ref 26–34)
MCHC RBC AUTO-ENTMCNC: 33.8 G/DL (ref 30–36.5)
MCV RBC AUTO: 75.5 FL (ref 80–99)
MONOCYTES # BLD: 0.5 K/UL (ref 0–1)
MONOCYTES NFR BLD: 5 % (ref 5–13)
NEUTS BAND NFR BLD MANUAL: 1 % (ref 0–6)
NEUTS SEG # BLD: 7 K/UL (ref 1.8–8)
NEUTS SEG NFR BLD: 65 % (ref 32–75)
NRBC # BLD: 0 K/UL (ref 0–0.01)
NRBC BLD-RTO: 0 PER 100 WBC
PLATELET # BLD AUTO: 358 K/UL (ref 150–400)
PMV BLD AUTO: 10.1 FL (ref 8.9–12.9)
POTASSIUM SERPL-SCNC: 3.9 MMOL/L (ref 3.5–5.1)
PROCALCITONIN SERPL-MCNC: <0.05 NG/ML
PROT SERPL-MCNC: 7.2 G/DL (ref 6.4–8.2)
RBC # BLD AUTO: 4.82 M/UL (ref 3.8–5.2)
RBC MORPH BLD: ABNORMAL
SODIUM SERPL-SCNC: 139 MMOL/L (ref 136–145)
WBC # BLD AUTO: 10.6 K/UL (ref 3.6–11)

## 2021-10-05 PROCEDURE — 84145 PROCALCITONIN (PCT): CPT

## 2021-10-05 PROCEDURE — 80053 COMPREHEN METABOLIC PANEL: CPT

## 2021-10-05 PROCEDURE — 85379 FIBRIN DEGRADATION QUANT: CPT

## 2021-10-05 PROCEDURE — 85025 COMPLETE CBC W/AUTO DIFF WBC: CPT

## 2021-10-05 PROCEDURE — 86140 C-REACTIVE PROTEIN: CPT

## 2021-10-05 PROCEDURE — 74011250637 HC RX REV CODE- 250/637: Performed by: INTERNAL MEDICINE

## 2021-10-05 PROCEDURE — 74011250636 HC RX REV CODE- 250/636: Performed by: INTERNAL MEDICINE

## 2021-10-05 PROCEDURE — 82728 ASSAY OF FERRITIN: CPT

## 2021-10-05 PROCEDURE — 36415 COLL VENOUS BLD VENIPUNCTURE: CPT

## 2021-10-05 RX ORDER — DEXAMETHASONE 6 MG/1
6 TABLET ORAL DAILY
Qty: 8 TABLET | Refills: 0 | Status: SHIPPED | OUTPATIENT
Start: 2021-10-05 | End: 2022-10-14

## 2021-10-05 RX ADMIN — ENOXAPARIN SODIUM 120 MG: 120 INJECTION SUBCUTANEOUS at 14:55

## 2021-10-05 RX ADMIN — OXYCODONE HYDROCHLORIDE AND ACETAMINOPHEN 500 MG: 500 TABLET ORAL at 08:27

## 2021-10-05 RX ADMIN — ENOXAPARIN SODIUM 120 MG: 120 INJECTION SUBCUTANEOUS at 04:56

## 2021-10-05 RX ADMIN — Medication 1000 UNITS: at 08:27

## 2021-10-05 RX ADMIN — SODIUM CHLORIDE AND POTASSIUM CHLORIDE: 4.5; 1.49 INJECTION, SOLUTION INTRAVENOUS at 14:56

## 2021-10-05 RX ADMIN — BARICITINIB 4 MG: 2 TABLET, FILM COATED ORAL at 08:27

## 2021-10-05 RX ADMIN — DEXAMETHASONE 6 MG: 6 TABLET ORAL at 08:27

## 2021-10-05 RX ADMIN — Medication 50 MG: at 08:27

## 2021-10-05 NOTE — PROGRESS NOTES
10/5/2021   CARE MANAGEMENT NOTE:  CM reviewed EMR and handoff received from previous  Ward Wahl). Pt was admitted with COVID. Reportedly, pt reside with her children (24 y.o and 15 y.o sons). RUR 11%    Transition Plan of Care:  1. Plan is for pt to return home with family; no anticipated post discharge plans indicated at this time. 2.  Home oxygen eval will be needed prior to discharge  3. Outpt f/u  4. Pt will arrange her own transportation home    CM will continue to follow pt until discharged.   Autumn

## 2021-10-05 NOTE — CONSULTS
[facility]   : 1985 Admit Date: 10/2/2021   Phone: [phone]  Room: [facility]/01   PCP: Juan Ramon Nunez MD  MRN: [id number]   Date: 10/5/2021  Code: Full Code        HPI:      Chart and notes reviewed. Data reviewed. I review the patient's current medications in the medical record at each encounter.  I have evaluated and examined the patient. 3:35 PM       History was obtained from patient. I was asked by Janak Zavala MD to see Delmy Young in consultation for a chief complaint of Covid-19; query need for Dex, anticoagulation, Bariciitnib. History of Present Illness:   is a very pleasant, 44 yo lady with a PMH of obesity, asthma, GERD, sickle cell trait that presented to [facility] with worsening shortness of breath and chest tightness. Originally tested positive on . She received her first Covid vaccine about one month ago. Her son also tested positive. She was admitted due to hypoxia and markedly elevated D-dimer on 10/3. She reports that she is feeling much better. She can now take a deep breath. Has not required oxygen. No fever/chills. Denies CP, LE pain, or swelling. Images:  Personally reviewed. Chest CTA 10/3:  Diffuse bilateral asd.   No PE  BLE dopplers negative for DVT      Past Medical History:   Diagnosis Date    Asthma      delivery 2003    GERD (gastroesophageal reflux disease)     no medication    Morbid obesity ([address]     Sickle cell trait ([address]        Past Surgical History:   Procedure Laterality Date    HX  SECTION      HX  SECTION      HX HEENT      TEETH EXTRACTION       Family History   Problem Relation Age of Onset    Diabetes Mother     Hypertension Father     No Known Problems Sister     Hypertension Maternal Grandmother     No Known Problems Sister     Asthma Son     Asthma Daughter     Anesth Problems Neg Hx        Social History     Tobacco Use    Smoking status: Current Some Day Smoker     Packs/day: 0.25     Years: 4.00     Pack years: 1.00    Smokeless tobacco: Never Used   Substance Use Topics    Alcohol use: Yes     Comment: RARELY- DRINK WINE       Allergies   Allergen Reactions    Codeine Hives and Rash    Tylenol [Acetaminophen] Itching       Current Facility-Administered Medications   Medication Dose Route Frequency    ondansetron (ZOFRAN ODT) tablet 4 mg  4 mg Oral Q4H PRN    polyethylene glycol (MIRALAX) packet 17 g  17 g Oral DAILY PRN    ascorbic acid (vitamin C) (VITAMIN C) tablet 500 mg  500 mg Oral DAILY    cholecalciferol (VITAMIN D3) (1000 Units /25 mcg) tablet 1,000 Units  1,000 Units Oral DAILY    zinc gluconate tablet 50 mg  50 mg Oral DAILY    enoxaparin (LOVENOX) injection 120 mg  1 mg/kg SubCUTAneous Q12H    0.45% sodium chloride with KCl 20 mEq/L infusion   IntraVENous CONTINUOUS    baricitinib (OLUMIANT) tablet 4 mg  4 mg Oral DAILY    dexAMETHasone (DECADRON) tablet 6 mg  6 mg Oral DAILY         REVIEW OF SYSTEMS   Negative except as stated in the HPI. Physical Exam:   Visit Vitals  BP (!) 147/87 (BP 1 Location: Left upper arm, BP Patient Position: At rest)   Pulse 65   Temp 98.4 °F (36.9 °C)   Resp 18   Ht 5' 5\" (1.651 m)   Wt 122.2 kg (269 lb 6.4 oz)   SpO2 98%   BMI 44.83 kg/m²       General:  Alert, cooperative, no distress, appears stated age. Head:  Normocephalic, without obvious abnormality, atraumatic. Eyes:  Conjunctivae/corneas clear. Nose: Nares normal. Septum midline. Mucosa normal.    Throat: Lips, mucosa, and tongue normal. Teeth and gums normal.   Neck: Supple, symmetrical, trachea midline   Back:   Symmetric, no curvature. ROM normal.   Lungs:   Clear to auscultation bilaterally. Chest wall:  No tenderness or deformity. Heart:  Regular rate and rhythm, S1, S2 normal, no murmur, click, rub or gallop. Abdomen:   Soft, non-tender.  Bowel sounds normal. Extremities: Extremities normal, atraumatic, no cyanosis or edema. Pulses: 2+ and symmetric all extremities. Skin: Skin color, texture, turgor normal. No rashes or lesions   Lymph nodes: Cervical, supraclavicular, and axillary nodes normal.   Neurologic: Grossly nonfocal       Lab Results   Component Value Date/Time    Sodium 139 10/05/2021 04:50 AM    Potassium 3.9 10/05/2021 04:50 AM    Chloride 107 10/05/2021 04:50 AM    CO2 25 10/05/2021 04:50 AM    BUN 16 10/05/2021 04:50 AM    Creatinine 0.73 10/05/2021 04:50 AM    Glucose 94 10/05/2021 04:50 AM    Calcium 8.0 (L) 10/05/2021 04:50 AM    Magnesium 2.2 10/04/2021 03:40 AM       Lab Results   Component Value Date/Time    WBC 10.6 10/05/2021 04:50 AM    HGB 12.3 10/05/2021 04:50 AM    PLATELET 369 13/79/5630 04:50 AM    MCV 75.5 (L) 10/05/2021 04:50 AM       Lab Results   Component Value Date/Time    Alk.  phosphatase 78 10/05/2021 04:50 AM    Protein, total 7.2 10/05/2021 04:50 AM    Albumin 2.4 (L) 10/05/2021 04:50 AM    Globulin 4.8 (H) 10/05/2021 04:50 AM       Lab Results   Component Value Date/Time    Iron 8 (L) 06/09/2020 08:05 AM    TIBC 324 06/09/2020 08:05 AM    Iron % saturation 2 (L) 06/09/2020 08:05 AM    Ferritin 184 10/05/2021 04:50 AM       Lab Results   Component Value Date/Time    C-Reactive protein 3.20 (H) 10/05/2021 04:50 AM    TSH 0.58 12/14/2020 04:46 PM        No results found for: PH, PHI, PCO2, PCO2I, PO2, PO2I, HCO3, HCO3I, FIO2, FIO2I    No results found for: CPK, RCK1, RCK2, RCK3, RCK4, CKNDX, CKND1, TROPT, TROIQ, BNPP, BNP     Lab Results   Component Value Date/Time    Culture result: No significant growth, <10,000 CFU/mL 10/03/2021 09:00 AM    Culture result: MIXED UROGENITAL AJ ISOLATED 06/09/2020 08:13 AM    Culture result:  06/21/2013 03:30 PM     HEAVY NORMAL RESPIRATORY AJ  LIGHT STREPTOCOCCI, BETA HEMOLYTIC GROUP C       No results found for: TOXA1, RPR, HBCM, HBSAG, HAAB, HCAB1, HAAT, G6PD, CRYAC, HIVGT, HIVR, HIV1, HIV12, HIVPC, HIVRPI    No results found for: VANCT, CPK    Lab Results   Component Value Date/Time    Color YELLOW/STRAW 10/03/2021 09:00 AM    Appearance CLEAR 10/03/2021 09:00 AM    Specific gravity 1.010 10/03/2021 09:00 AM    pH (UA) 6.5 10/03/2021 09:00 AM    Protein 100 (A) 10/03/2021 09:00 AM    Glucose Negative 10/03/2021 09:00 AM    Ketone TRACE (A) 10/03/2021 09:00 AM    Bilirubin Negative 10/03/2021 09:00 AM    Blood Negative 10/03/2021 09:00 AM    Urobilinogen 2.0 (H) 10/03/2021 09:00 AM    Nitrites Negative 10/03/2021 09:00 AM    Leukocyte Esterase Negative 10/03/2021 09:00 AM    WBC 0-4 10/03/2021 09:00 AM    RBC 0-5 10/03/2021 09:00 AM    Bacteria Negative 10/03/2021 09:00 AM       IMPRESSION  · Covid-19 Pneumonia  · Obesity  · Asthma; not in acute exacerbation    PLAN  · Currently saturating well on RA  · On Baricitinib  · Continue Dex 6mg daily  · On therapeutic Lovenox  · Reasonable to discharge; could send home on Xarelto for 15 days as well as PO Dexamethasone. All of her inflammatory markers are trending down and clinically she looks excellent. Thank you very much for the consult.         Anthony Enriquez NP

## 2021-10-05 NOTE — PROGRESS NOTES
Discharge instructions reviewed with Patient verbalized understanding. Discharge medications reviewed with Patient and prescriptions given to patient. Patient made aware of follow up MD visits. An After Visit Summary was printed and given to the patient, hardcopy signed and placed in chart. Peripheral IV was removed, triple lumen IJ removed/pressure applied and transparent dressing placed. Patient transported home.

## 2021-10-05 NOTE — DISCHARGE INSTRUCTIONS
HOSPITALIST DISCHARGE INSTRUCTIONS  NAME: Burke Braun   :  1985   MRN:  [id number]     Date/Time:  10/5/2021 4:37 PM    ADMIT DATE: 10/2/2021     DISCHARGE DATE: 10/5/2021     PRINCIPAL DISCHARGE DIAGNOSES:  COVID-19    MEDICATIONS:  · It is important that you take the medication exactly as they are prescribed. Note the changes and additions to your medications. Be sure you understand these changes before you are discharged today. · Keep your medication in the bottles provided by the pharmacist and keep a list of the medication names, dosages, and times to be taken in your wallet. · Do not take other medications without consulting your doctor. Pain Management: per above medications    What to do at Home    Recommended diet:  Resume previous diet    Recommended activity: Activity as tolerated    If you experience any of the following symptoms then please call your primary care physician or return to the emergency room if you cannot get hold of your doctor:  Fever, chills, severe abdominal pain, nausea, vomiting, diarrhea, confusion, weakness, falling, bleeding, severe chest pain, shortness of breath, or other severe concerning symptoms    Follow Up: Follow-up Information     Follow up With Specialties Details Why Contact Info    Alberta Arriaga MD Dermatology   [facility] [id number]      [place] [address] [facility] [phone]    Torie Lyle MD Family Medicine In 3 days  [place] [address]  [facility] [phone]              Information obtained by :  I understand that if any problems occur once I am at home I am to contact my physician. I understand and acknowledge receipt of the instructions indicated above.                                                                                                                                            Physician's or R.N.'s Signature Date/Time                                                                                                                                              Patient or Representative Signature                                                          Date/Time      Symptom-Based Strategy for Discontinuing Transmission-Based Precautions    Patients with severe to critical illness or who are severely immunocompromised:  - At least 10 days and up to 20 days have passed since symptoms first appeared and  - At least 24 hours have passed since last fever without the use of fever-reducing medications and   - Symptoms (e.g., cough, shortness of breath) have improved  - Consider consultation with infection control experts    Patients who are severely immunocompromised may produce replication-competent virus beyond 20 days after symptom onset or, for those who were asymptomatic throughout their infection, the date of their first positive viral test. Consultation with infectious diseases specialists is recommended. Use of a test-based strategy for determining when Transmission-Based Precautions may be discontinued could be considered. [employer]      ====================================    Advance Care Planning  People with COVID-19 may have no symptoms, mild symptoms, such as fever, cough, and shortness of breath or they may have more severe illness, developing severe and fatal pneumonia. As a result, Advance Care Planning with attention to naming a health care decision maker (someone you trust to make healthcare decisions for you if you could not speak for yourself) and sharing other health care preferences is important BEFORE a possible health crisis. Please contact your Primary Care Provider to discuss Advance Care Planning.      Preventing the Spread of Coronavirus Disease 2019 in Homes and Residential Communities  For the most recent information go to [employer]    Prevention steps for People with confirmed or suspected COVID-19 (including persons under investigation) who do not need to be hospitalized  and   People with confirmed COVID-19 who were hospitalized and determined to be medically stable to go home    Your healthcare provider and public health staff will evaluate whether you can be cared for at home. If it is determined that you do not need to be hospitalized and can be isolated at home, you will be monitored by staff from your local or state health department. You should follow the prevention steps below until a healthcare provider or local or state health department says you can return to your normal activities. Stay home except to get medical care  People who are mildly ill with COVID-19 are able to isolate at home during their illness. You should restrict activities outside your home, except for getting medical care. Do not go to work, school, or public areas. Avoid using public transportation, ride-sharing, or taxis. Separate yourself from other people and animals in your home  People: As much as possible, you should stay in a specific room and away from other people in your home. Also, you should use a separate bathroom, if available. Animals: You should restrict contact with pets and other animals while you are sick with COVID-19, just like you would around other people. Although there have not been reports of pets or other animals becoming sick with COVID-19, it is still recommended that people sick with COVID-19 limit contact with animals until more information is known about the virus. When possible, have another member of your household care for your animals while you are sick. If you are sick with COVID-19, avoid contact with your pet, including petting, snuggling, being kissed or licked, and sharing food.  If you must care for your pet or be around animals while you are sick, wash your hands before and after you interact with pets and wear a facemask. Call ahead before visiting your doctor  If you have a medical appointment, call the healthcare provider and tell them that you have or may have COVID-19. This will help the healthcare providers office take steps to keep other people from getting infected or exposed. Wear a facemask  You should wear a facemask when you are around other people (e.g., sharing a room or vehicle) or pets and before you enter a healthcare providers office. If you are not able to wear a facemask (for example, because it causes trouble breathing), then people who live with you should not stay in the same room with you, or they should wear a facemask if they enter your room. Cover your coughs and sneezes  Cover your mouth and nose with a tissue when you cough or sneeze. Throw used tissues in a lined trash can. Immediately wash your hands with soap and water for at least 20 seconds or, if soap and water are not available, clean your hands with an alcohol-based hand  that contains at least 60% alcohol. Clean your hands often  Wash your hands often with soap and water for at least 20 seconds, especially after blowing your nose, coughing, or sneezing; going to the bathroom; and before eating or preparing food. If soap and water are not readily available, use an alcohol-based hand  with at least 60% alcohol, covering all surfaces of your hands and rubbing them together until they feel dry. Soap and water are the best option if hands are visibly dirty. Avoid touching your eyes, nose, and mouth with unwashed hands. Avoid sharing personal household items  You should not share dishes, drinking glasses, cups, eating utensils, towels, or bedding with other people or pets in your home. After using these items, they should be washed thoroughly with soap and water.     Clean all high-touch surfaces everyday  High touch surfaces include counters, tabletops, doorknobs, bathroom fixtures, toilets, phones, keyboards, tablets, and bedside tables. Also, clean any surfaces that may have blood, stool, or body fluids on them. Use a household cleaning spray or wipe, according to the label instructions. Labels contain instructions for safe and effective use of the cleaning product including precautions you should take when applying the product, such as wearing gloves and making sure you have good ventilation during use of the product. Monitor your symptoms  Seek prompt medical attention if your illness is worsening (e.g., difficulty breathing). Before seeking care, call your healthcare provider and tell them that you have, or are being evaluated for, COVID-19. Put on a facemask before you enter the facility. These steps will help the healthcare providers office to keep other people in the office or waiting room from getting infected or exposed. Ask your healthcare provider to call the local or state health department. Persons who are placed under active monitoring or facilitated self-monitoring should follow instructions provided by their local health department or occupational health professionals, as appropriate. When working with your local health department check their available hours. If you have a medical emergency and need to call 911, notify the dispatch personnel that you have, or are being evaluated for COVID-19. If possible, put on a facemask before emergency medical services arrive. Discontinuing home isolation  Patients with confirmed COVID-19 should remain under home isolation precautions until the risk of secondary transmission to others is thought to be low. The decision to discontinue home isolation precautions should be made on a case-by-case basis, in consultation with healthcare providers and state and local health departments.

## 2021-10-05 NOTE — PROGRESS NOTES
Bedside and Verbal shift change report given to VERENICE Rudolph (oncoming nurse) by Kim Villalta (offgoing nurse). Report included the following information SBAR, Kardex, Intake/Output, MAR, Recent Results and Med Rec Status.

## 2021-10-05 NOTE — DISCHARGE SUMMARY
.        Physician Discharge Summary     Patient ID:  Eliodoro Nageotte  [id number]  33 y.o.  1985    Admit date: 10/2/2021    Discharge date: 10/5/2021    Admission Diagnoses: Pneumonia due to COVID-19 virus [U07.1, J12.82]    Principal Discharge Diagnoses:    COVID-19    OTHER PROBLEMS ADDRESSEDS  Principal Diagnosis Pneumonia due to COVID-19 virus                                            Principal Problem:    Pneumonia due to COVID-19 virus (10/3/2021)    Active Problems:    Sickle cell trait ([facility] (6/29/2010)      Asthma ()      Morbid obesity (HCC) ()      GERD (gastroesophageal reflux disease) ()      Overview: no medication      Acute hypoxemic respiratory failure ([facility] (10/3/2021)       Patient Active Problem List   Diagnosis Code    [facility] D57.3    Pneumonia due to COVID-19 virus U07.1, J12.82    Asthma J45.909    Morbid obesity ([facility] E66.01    GERD (gastroesophageal reflux disease) K21.9    Acute hypoxemic respiratory failure ([facility] J96.01         Hospital Course:   Ms. Efra Gonzalez is a 44 yo F w/ hx of asthma, sickle cell trait, morbid obesity, recent dx of COVID-19 presenting w/ dyspnea, admitted for AHRF    AHRF 2/2 COVID-19: resolved. 98% on RA at SD. CTA chest and LE duplex negative. Pulmonology recommended DOAC for ~15 days. I did discuss R/B/A of anticoagulation and she agreed to proceed. Will DC home on dexamethasone as well     Morbid obesity: Body mass index is 44.83 kg/m². Would benefit from weight loss and dietary / lifestyle modifications, as well as outpatient polysomnography    Sickle cell trait / hx iron deficiency anemia: follow up outpatient     Asthma: no wheezing. No exacerbation      Pt discharged in improved and stable condition. Procedures performed: see above    Imaging studies: see above  CTA CHEST W OR W WO CONT    Result Date: 10/3/2021  No acute cardiopulmonary disease.  No acute pulmonary embolus    XR CHEST PORT    Result Date: 10/2/2021  1. No complicating features post line placement. 2. Patchy bilateral airspace disease           PCP: Cathy Wadsworth MD    Consults: Pulmonary/Intensive care    Discharge Exam:  Visit Vitals  BP (!) 147/87 (BP 1 Location: Left upper arm, BP Patient Position: At rest)   Pulse 65   Temp 98.4 °F (36.9 °C)   Resp 18   Ht 5' 5\" (1.651 m)   Wt 122.2 kg (269 lb 6.4 oz)   LMP 07/27/2020 (LMP Unknown)   SpO2 98%   BMI 44.83 kg/m²     GEN: NAD  CV: RRR  RESP: CTAB  ABD: NTTP    Disposition: home    Patient Instructions:   Current Discharge Medication List      START taking these medications    Details   dexAMETHasone (DECADRON) 6 mg tablet Take 1 Tablet by mouth daily. Qty: 8 Tablet, Refills: 0  Start date: 10/5/2021      apixaban (Eliquis) 5 mg tablet Take 1 Tablet by mouth two (2) times a day for 15 days. Qty: 30 Tablet, Refills: 0  Start date: 10/5/2021, End date: 10/20/2021      ascorbic acid, vitamin C, (Vitamin C) 500 mg tablet Take 1 Tablet by mouth two (2) times a day for 30 days. Qty: 60 Tablet, Refills: 0  Start date: 10/3/2021, End date: 11/2/2021      cholecalciferol (Vitamin D3) (2,000 UNITS /50 MCG) cap capsule Take 1 Capsule by mouth daily. Qty: 30 Capsule, Refills: 0  Start date: 10/3/2021      zinc sulfate 50 mg zinc (220 mg) tablet Take 1 Tablet by mouth daily. Qty: 30 Tablet, Refills: 0  Start date: 10/3/2021         CONTINUE these medications which have NOT CHANGED    Details   polyethylene glycol (MIRALAX) 17 gram/dose powder DISSOLVE 17 GRAMS IN 8 OUNCES OF LIQUID AND DRINK ONCE A DAY  Qty: 235 g, Refills: 4      PNV No12-Iron-FA-DSS-OM-3 29 mg iron-1 mg -50 mg CPKD Take 1 Tab by mouth every morning.              Activity: See discharge instructions  Diet: See discharge instructions  Wound Care: See discharge instructions    Follow-up Information     Follow up With Specialties Details Why Contact Info    Malachi Hatchet, MD Dermatology   [address]  [phone] [place] [address] Urgent Care [phone]    Zita Joyner MD Family Medicine In 3 days  [address] [place] [address]  [phone]            I spent 35 minutes on this discharge.     Signed:  Araseli Gonsales MD  10/5/2021  4:38 PM    CC: PCP Dr. Deric Henderson

## 2021-10-06 ENCOUNTER — PATIENT OUTREACH (OUTPATIENT)
Dept: CASE MANAGEMENT | Age: 36
End: 2021-10-06

## 2021-10-06 NOTE — PROGRESS NOTES
Patient contacted regarding COVID-19 diagnosis. Patient tested positive on . Care Transition Nurse contacted the patient by telephone to perform post discharge assessment. Call within 2 business days of discharge: Yes Verified name and  with patient as identifiers. Provided introduction to self, and explanation of the CTN/ACM role, and reason for call due to risk factors for infection and/or exposure to COVID-19. Symptoms reviewed with patient who verbalized the following symptoms: fatigue, cough, shortness of breath, no new symptoms and no worsening symptoms      Due to no new or worsening symptoms encounter was not routed to provider for escalation. Discussed follow-up appointments. If no appointment was previously scheduled, appointment scheduling offered:  no. [facility] follow up appointment(s): No future appointments. Non-[facility] follow up appointment(s): patient plans to contact Dr. Kaushal Johnston, PCP to arrange follow up appt    Interventions to address risk factors: Scheduled appointment with PCP-pt will arrange follow up appt and Obtained and reviewed discharge summary and/or continuity of care documents     Advance Care Planning:   Does patient have an Advance Directive: not on file. Educated patient about risk for severe COVID-19 due to risk factors according to CDC guidelines. CTN reviewed discharge instructions, medical action plan and red flag symptoms with the patient who verbalized understanding. Discussed COVID vaccination status: yes. Education provided on COVID-19 vaccination as appropriate. Discussed exposure protocols and quarantine with CDC Guidelines. Patient was given an opportunity to verbalize any questions and concerns and agrees to contact CTN or health care provider for questions related to their healthcare. Reviewed and educated patient on any new and changed medications related to discharge diagnosis     Was patient discharged with a pulse oximeter?  no Discussed and confirmed pulse oximeter discharge instructions and when to notify provider or seek emergency care. CTN provided contact information. Plan for follow-up call in 5-7 days based on severity of symptoms and risk factors.

## 2021-10-07 ENCOUNTER — TELEPHONE (OUTPATIENT)
Dept: INTERNAL MEDICINE CLINIC | Age: 36
End: 2021-10-07

## 2021-10-07 NOTE — TELEPHONE ENCOUNTER
----- Message from Ny Anderson sent at 10/7/2021  9:50 AM EDT -----  Regarding: Bell/telephone  Pt is requesting a hospital f/up appointment. She went to [facility] ER on 10/2/21 for shortness of breath and she was discharged on 10/5/21 and she was tested positive for covid on 9/27/21. She has a disability form from her job that is needs to have filled out she is requesting to know if someone could drop the form off. Pts number is [phone].

## 2021-10-07 NOTE — TELEPHONE ENCOUNTER
Spoke with patient. She stated that she could e-mail the forms to her neighbor and they would drop them off at the office to be available for VV with Dr. Harsha Rod.  Appointment for virtual visit made for 10/11/21

## 2021-10-11 ENCOUNTER — VIRTUAL VISIT (OUTPATIENT)
Dept: INTERNAL MEDICINE CLINIC | Age: 36
End: 2021-10-11
Payer: MEDICAID

## 2021-10-11 DIAGNOSIS — J96.01 ACUTE RESPIRATORY FAILURE WITH HYPOXIA (HCC): ICD-10-CM

## 2021-10-11 DIAGNOSIS — J12.82 PNEUMONIA DUE TO COVID-19 VIRUS: Primary | ICD-10-CM

## 2021-10-11 DIAGNOSIS — U07.1 PNEUMONIA DUE TO COVID-19 VIRUS: Primary | ICD-10-CM

## 2021-10-11 DIAGNOSIS — Z76.89 ESTABLISHING CARE WITH NEW DOCTOR, ENCOUNTER FOR: ICD-10-CM

## 2021-10-11 PROCEDURE — 99214 OFFICE O/P EST MOD 30 MIN: CPT | Performed by: FAMILY MEDICINE

## 2021-10-11 NOTE — PROGRESS NOTES
Chief Complaint   Patient presents with   [facility] Follow Up     covid    Documentation     disability/leave of absence for covid illness

## 2021-10-11 NOTE — PROGRESS NOTES
Rahel Bolanos (: 1985) is a 43 y.o. female, established patient, here for evaluation of the following chief complaint(s):   Hospital Follow Up (covid) and Documentation (disability/leave of absence for covid illness)       ASSESSMENT/PLAN:  Below is the assessment and plan developed based on review of pertinent history, labs, studies, and medications. 1. Pneumonia due to COVID-19 virus-symptom improvement. Patient will complete outpatient medications. Home health nurse will follow along. No need for COVID-19 vaccination in 3 months. 2. Acute respiratory failure with hypoxia (HCC)-due to #1, improved. Patient will continue oxygen therapy as instructed at discharge. 3. Asthma-recommend pneumonia vaccination and flu vaccination. Patient will continue nebulized or inhaled medications as instructed at discharge. 4.  Establishing care with new doctor, encounter for    Return in about 1 week (around 10/18/2021). SUBJECTIVE/OBJECTIVE:  HPI  New 33-year-old patient with history of COVID-19 pneumonia, asthma, GERD, sickle cell trait, rheumatoid arthritis? and obesity for follow-up of Covid pneumonia requiring hospital ICU management. Patient had a + covid test on 21, was admitted on 10-2-21 and discharged on 10/5/2021. No intubation required. Patient was initiated on remdesivir?, steroids, duoneb nebulizer treatments,  vitamin D vitamin C, zinc and 30 days of DOAC after pulmonary evaluated her for positive D-dimer, negative lower extremity venous Doppler with a negative CTA chest.      Patient is doing well on her discharge medications. Appetite and sleep are within normal limits. Today's pulse oximetry reading was 92-94% on RA.  pulse ox was 87%(RA) with exertion. Patient is feeling better overall with decreased cough shortness of breath body aches. Denies fever or chills. Patient denies wheezing or heartburn.     Review of Systems     No data recorded     Physical Exam    [INSTRUCTIONS:  \"[x]\" Indicates a positive item  \"[]\" Indicates a negative item  -- DELETE ALL ITEMS NOT EXAMINED]    Constitutional: [x] Appears well-developed and well-nourished [x] No apparent distress      [] Abnormal -     Mental status: [x] Alert and awake  [x] Oriented to person/place/time [x] Able to follow commands    [] Abnormal -     Eyes:   EOM    [x]  Normal    [] Abnormal -   Sclera  [x]  Normal    [] Abnormal -          Discharge [x]  None visible   [] Abnormal -     HENT: [x] Normocephalic, atraumatic  [] Abnormal -   [x] Mouth/Throat: Mucous membranes are moist    External Ears [x] Normal  [] Abnormal -    Neck: [x] No visualized mass [] Abnormal -     Pulmonary/Chest: [x] Respiratory effort normal   [x] No visualized signs of difficulty breathing or respiratory distress        [] Abnormal -      Musculoskeletal:   [] Normal gait with no signs of ataxia         [x] Normal range of motion of neck        [] Abnormal -     Neurological:        [x] No Facial Asymmetry (Cranial nerve 7 motor function) (limited exam due to video visit)          [x] No gaze palsy        [] Abnormal -          Skin:        [x] No significant exanthematous lesions or discoloration noted on facial skin         [] Abnormal -            Psychiatric:       [x] Normal Affect [] Abnormal -        [x] No Hallucinations    Other pertinent observable physical exam findings:-            Zana Dao, was evaluated through a synchronous (real-time) audio-video encounter. The patient (or guardian if applicable) is aware that this is a billable service. Verbal consent to proceed has been obtained within the past 12 months. The visit was conducted pursuant to the emergency declaration under the [facility], [address] [place] [address] authority and the [employer] General Act.   Patient identification was verified, and a caregiver was present when appropriate. The patient was located in a state where the provider was credentialed to provide care. An electronic signature was used to authenticate this note.   -- Cristina Charlton MD

## 2021-10-14 ENCOUNTER — PATIENT OUTREACH (OUTPATIENT)
Dept: CASE MANAGEMENT | Age: 36
End: 2021-10-14

## 2021-10-14 NOTE — PROGRESS NOTES
Patient contacted regarding COVID-19 diagnosis. Care Transition Nurse contacted the patient by telephone to perform follow-up assessment. Reports R ear \"pops\". Pt plans to follow up with PCP    Symptoms reviewed with patient who verbalized the following symptoms: fatigue, shortness of breath, loss or taste or smell and dizziness/lightheadedness. Due to no new or worsening symptoms encounter was not routed to provider for escalation. Interventions to address risk factors: Scheduled appointment with PCP-10/11    Educated patient about risk for severe COVID-19 due to risk factors according to CDC guidelines. CTN reviewed discharge instructions, medical action plan and red flag symptoms with the patient who verbalized understanding. Discussed COVID vaccination status: no. Education provided on COVID-19 vaccination as appropriate. Discussed exposure protocols and quarantine with CDC Guidelines. Patient was given an opportunity to verbalize any questions and concerns and agrees to contact CTN or health care provider for questions related to their healthcare. Was patient discharged with a pulse oximeter? no   CTN provided contact information. Plan for follow-up call in 5-7 days based on severity of symptoms and risk factors.

## 2021-10-21 ENCOUNTER — PATIENT OUTREACH (OUTPATIENT)
Dept: CASE MANAGEMENT | Age: 36
End: 2021-10-21

## 2021-10-21 NOTE — PROGRESS NOTES
Contacted the patient for a follow up, no answer.  Unable to leave message, No VM  CTN will continue to follow

## 2021-11-03 DIAGNOSIS — Z76.89 ENCOUNTER FOR WEIGHT MANAGEMENT: Primary | ICD-10-CM

## 2021-11-03 DIAGNOSIS — E66.01 MORBID OBESITY (HCC): ICD-10-CM

## 2022-03-18 PROBLEM — J12.82 PNEUMONIA DUE TO COVID-19 VIRUS: Status: ACTIVE | Noted: 2021-10-03

## 2022-03-18 PROBLEM — U07.1 PNEUMONIA DUE TO COVID-19 VIRUS: Status: ACTIVE | Noted: 2021-10-03

## 2022-03-20 PROBLEM — J96.01 ACUTE HYPOXEMIC RESPIRATORY FAILURE (HCC): Status: ACTIVE | Noted: 2021-10-03

## 2022-10-14 ENCOUNTER — OFFICE VISIT (OUTPATIENT)
Dept: INTERNAL MEDICINE CLINIC | Age: 37
End: 2022-10-14
Payer: MEDICAID

## 2022-10-14 VITALS
SYSTOLIC BLOOD PRESSURE: 143 MMHG | TEMPERATURE: 98.2 F | HEIGHT: 65 IN | OXYGEN SATURATION: 93 % | HEART RATE: 85 BPM | BODY MASS INDEX: 48.12 KG/M2 | DIASTOLIC BLOOD PRESSURE: 87 MMHG | WEIGHT: 288.8 LBS | RESPIRATION RATE: 24 BRPM

## 2022-10-14 DIAGNOSIS — J01.90 ACUTE SINUSITIS, RECURRENCE NOT SPECIFIED, UNSPECIFIED LOCATION: Primary | ICD-10-CM

## 2022-10-14 DIAGNOSIS — Z79.899 MEDICATION MANAGEMENT: ICD-10-CM

## 2022-10-14 DIAGNOSIS — E66.01 OBESITY, MORBID (MORE THAN 100 LBS OVER IDEAL WEIGHT OR BMI > 40) (HCC): ICD-10-CM

## 2022-10-14 DIAGNOSIS — J45.20 MILD INTERMITTENT ASTHMA WITHOUT COMPLICATION: ICD-10-CM

## 2022-10-14 PROCEDURE — 99213 OFFICE O/P EST LOW 20 MIN: CPT | Performed by: FAMILY MEDICINE

## 2022-10-14 RX ORDER — ALBUTEROL SULFATE 90 UG/1
AEROSOL, METERED RESPIRATORY (INHALATION)
COMMUNITY

## 2022-10-14 RX ORDER — PREDNISONE 10 MG/1
TABLET ORAL
Qty: 21 TABLET | Refills: 0 | Status: SHIPPED | OUTPATIENT
Start: 2022-10-14

## 2022-10-14 RX ORDER — ASCORBIC ACID 250 MG
TABLET ORAL
COMMUNITY

## 2022-10-14 RX ORDER — ALBUTEROL SULFATE 90 UG/1
2 AEROSOL, METERED RESPIRATORY (INHALATION)
Qty: 18 G | Refills: 1 | Status: SHIPPED | OUTPATIENT
Start: 2022-10-14

## 2022-10-14 RX ORDER — LORATADINE 10 MG/1
10 TABLET ORAL
Qty: 30 TABLET | Refills: 2 | Status: SHIPPED | OUTPATIENT
Start: 2022-10-14

## 2022-10-14 RX ORDER — AZITHROMYCIN 250 MG/1
TABLET, FILM COATED ORAL
Qty: 6 TABLET | Refills: 0 | Status: SHIPPED | OUTPATIENT
Start: 2022-10-14

## 2022-10-14 NOTE — PROGRESS NOTES
Chief Complaint   Patient presents with    Other     Concern about weight    Allergies     1. Have you been to the ER, urgent care clinic since your last visit? Hospitalized since your last visit? Yes Where: [facility] Admission    2. Have you seen or consulted any other health care providers outside of the [address] [place] [address] since your last visit? Include any pap smears or colon screening.  Yes Where: PCP

## 2022-10-14 NOTE — PROGRESS NOTES
SPORTS MEDICINE AND PRIMARY CARE  Martha Benavides MD  [address]     Chief Complaint   Patient presents with    Other     Concern about weight    Allergies       SUBJECTIVE:    Oscar Maldonado is a 42 y.o. female for evaluation of allergies and weight reduction. Morbid obesity  BMI 50  Attended 3 [facility] weight management lecture  Patient works 2 jobs and was unable to attend the program  Interested in [employer] therapy  walks her dog twice a day for exercise  Denies calorie reduction intent  Works 2 jobs  Stressful family life    Acute allergy symptoms x1 wk  Patient has nasal congestion and left-sided headache  Eye lacrimation  Feels under the weather  Had a negative rapid COVID test at home  Current Outpatient Medications   Medication Sig Dispense Refill    ascorbic acid, vitamin C, (Vitamin C) 250 mg tablet Take  by mouth. albuterol (PROVENTIL HFA, VENTOLIN HFA, PROAIR HFA) 90 mcg/actuation inhaler Take  by inhalation. albuterol (PROVENTIL HFA, VENTOLIN HFA, PROAIR HFA) 90 mcg/actuation inhaler Take 2 Puffs by inhalation every four (4) hours as needed for Wheezing. 18 g 1    predniSONE (STERAPRED DS) 10 mg dose pack See administration instruction per 10mg dose pack 21 Tablet 0    loratadine (Claritin) 10 mg tablet Take 1 Tablet by mouth daily as needed for Allergies. 30 Tablet 2    azithromycin (ZITHROMAX) 250 mg tablet Take 2 tablets today, then take 1 tablet daily for 4 days 6 Tablet 0    cholecalciferol (Vitamin D3) (2,000 UNITS /50 MCG) cap capsule Take 1 Capsule by mouth daily. 30 Capsule 0    zinc sulfate 50 mg zinc (220 mg) tablet Take 1 Tablet by mouth daily.  30 Tablet 0    polyethylene glycol (MIRALAX) 17 gram/dose powder DISSOLVE 17 GRAMS IN 8 OUNCES OF LIQUID AND DRINK ONCE A  g 4     Past Medical History:   Diagnosis Date    Asthma      delivery 2003    GERD (gastroesophageal reflux disease)     no medication    Morbid obesity ([address] Sickle cell trait ([facility] [facility]      Past Surgical History:   Procedure Laterality Date    HX  SECTION      HX  SECTION  2009    HX HEENT      TEETH EXTRACTION     Allergies   Allergen Reactions    Codeine Hives and Rash    Tylenol [Acetaminophen] Itching       REVIEW OF SYSTEMS:  General: negative for - chills or fever  ENT:  headache,nasal congestion; negative for -  tinnitus, hearing loss, vision changes, sore throat  Respiratory: negative for - cough, hemoptysis, shortness of breath or wheezing  Cardiovascular : negative for - chest pain, edema, palpitations or shortness of breath  Gastrointestinal: negative for - abdominal pain, blood in stools, heartburn or nausea/vomiting, diarrhea, constipation  Genito-Urinary: no dysuria, trouble voiding, hematuria   Musculoskeletal: negative for - gait disturbance, joint pain, joint stiffness , joint swelling, muscle aches  Neurological: no TIA or stroke symptoms  Hematologic: no bruises, no bleeding, no swollen glands  Integument: no lumps, mole changes, nail changes or rash  Endocrine:no malaise/lethargy or unexpected weight changes      Social History     Socioeconomic History    Marital status: SINGLE   Tobacco Use    Smoking status: Some Days     Packs/day: 0.25     Years: 4.00     Pack years: 1.00     Types: Cigarettes    Smokeless tobacco: Never   Vaping Use    Vaping Use: Never used   Substance and Sexual Activity    Alcohol use: Yes     Comment: RARELY- DRINK WINE    Drug use: No     Family History   Problem Relation Age of Onset    Diabetes Mother     Hypertension Father     No Known Problems Sister     Hypertension Maternal Grandmother     No Known Problems Sister     Asthma Son     Asthma Daughter     Anesth Problems Neg Hx        OBJECTIVE:     Visit Vitals  BP (!) 143/87 (BP 1 Location: Right arm, BP Patient Position: Sitting)   Pulse 85   Temp 98.2 °F (36.8 °C) (Oral)   Resp 24   Ht 5' 5\" (1.651 m)   Wt 288 lb 12.8 oz (131 kg)   LMP 2020 (LMP Unknown)   SpO2 93%   BMI 48.06 kg/m²     CONSTITUTIONAL: Pleasant cooperative in mild acute distress  EYES: perrla, eom intact, + lacrimation, no erythema or drainage noted  ENMT:moist mucous membranes, pharynx clear, TM unremarkable, lower turbinate edema  NECK: supple. Thyroid normal  RESPIRATORY: Chest: clear bilaterally  CARDIOVASCULAR: Heart: regular rate and rhythm  MUSCULOSKELETAL: Extremities: no edema,  INTEGUMENT: Warm and dry. Normal turgor. . Nails appear normal.  NEUROLOGIC: non-focal exam   MENTAL STATUS: alert ,appropriate affect         ASSESSMENT/ PLAN:  1. Acute sinusitis, recurrence not specified, unspecified location    2. Mild intermittent asthma without complication    3. Obesity, morbid (more than 100 lbs over ideal weight or BMI > 40) (formerly [facility])      Patient will return to discuss medication management for obesity after respiratory infection resolves  . Orders Placed This Encounter    ascorbic acid, vitamin C, (Vitamin C) 250 mg tablet    albuterol (PROVENTIL HFA, VENTOLIN HFA, PROAIR HFA) 90 mcg/actuation inhaler    albuterol (PROVENTIL HFA, VENTOLIN HFA, PROAIR HFA) 90 mcg/actuation inhaler    predniSONE (STERAPRED DS) 10 mg dose pack    loratadine (Claritin) 10 mg tablet    azithromycin (ZITHROMAX) 250 mg tablet     I have discussed the diagnosis with the patient and the intended plan as seen in the  orders above. The patient understands and agrees with the plan. The patient has   received an after visit summary. Questions were answered concerning  future plans  Patient labs and/or xrays were reviewed as available. Past records were reviewed as available. Counseled regarding healthy lifestyle          Advised patient to call back or return to office if symptoms develop/worsen/change/persist.  Discussed expected course/resolution/complications of diagnosis in detail with patient.      Medication risks/benefits/costs/interactions/alternatives discussed with patient    [facility] Denisa Newby M.D. This note was created using voice recognition software.   Edits have been made but syntax errors might exist.

## 2022-11-21 ENCOUNTER — OFFICE VISIT (OUTPATIENT)
Dept: INTERNAL MEDICINE CLINIC | Age: 37
End: 2022-11-21
Payer: MEDICAID

## 2022-11-21 VITALS
HEIGHT: 65 IN | RESPIRATION RATE: 16 BRPM | SYSTOLIC BLOOD PRESSURE: 150 MMHG | DIASTOLIC BLOOD PRESSURE: 99 MMHG | TEMPERATURE: 97.8 F | HEART RATE: 104 BPM | WEIGHT: 290 LBS | OXYGEN SATURATION: 98 % | BODY MASS INDEX: 48.32 KG/M2

## 2022-11-21 DIAGNOSIS — F32.A ANXIETY AND DEPRESSION: Primary | ICD-10-CM

## 2022-11-21 DIAGNOSIS — F41.9 ANXIETY AND DEPRESSION: Primary | ICD-10-CM

## 2022-11-21 DIAGNOSIS — Z79.899 MEDICATION MANAGEMENT: ICD-10-CM

## 2022-11-21 DIAGNOSIS — F43.21 GRIEF REACTION: ICD-10-CM

## 2022-11-21 PROCEDURE — 99213 OFFICE O/P EST LOW 20 MIN: CPT | Performed by: FAMILY MEDICINE

## 2022-11-21 RX ORDER — LORAZEPAM 0.5 MG/1
0.5 TABLET ORAL
Qty: 30 TABLET | Refills: 0 | Status: SHIPPED | OUTPATIENT
Start: 2022-11-21

## 2022-11-21 RX ORDER — BUPROPION HYDROCHLORIDE 150 MG/1
150 TABLET ORAL DAILY
Qty: 30 TABLET | Refills: 2 | Status: SHIPPED | OUTPATIENT
Start: 2022-11-21
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
## 2022-11-21 NOTE — PROGRESS NOTES
Chief Complaint   Patient presents with    Anxiety     Pt presents with anxiety due to the homicide of her ; seeking help to cope with recent events     Visit Vitals  BP (!) 150/99   Pulse (!) 104   Temp 97.8 °F (36.6 °C)   Resp 16   Ht 5' 5\" (1.651 m)   Wt 290 lb (131.5 kg)   LMP 07/27/2020 (LMP Unknown)   SpO2 98%   BMI 48.26 kg/m²     1. Have you been to the ER, urgent care clinic since your last visit? Hospitalized since your last visit? No    2. Have you seen or consulted any other health care providers outside of the [address] [place] [address] since your last visit? Include any pap smears or colon screening.  No

## 2022-12-06 ENCOUNTER — TELEPHONE (OUTPATIENT)
Dept: INTERNAL MEDICINE CLINIC | Age: 37
End: 2022-12-06

## 2022-12-06 NOTE — TELEPHONE ENCOUNTER
----- Message from Charo Lopez MD sent at 12/6/2022 10:53 AM EST -----  Regarding: lab call   Please call normal labs. Thank you!

## 2022-12-06 NOTE — TELEPHONE ENCOUNTER
----- Message from Ofelia Bailey MD sent at 12/6/2022 10:53 AM EST -----  Regarding: lab call   Please call normal labs. Thank you!

## 2023-05-24 ENCOUNTER — OFFICE VISIT (OUTPATIENT)
Facility: CLINIC | Age: 38
End: 2023-05-24
Payer: MEDICAID

## 2023-05-24 DIAGNOSIS — E66.01 MORBID OBESITY (HCC): ICD-10-CM

## 2023-05-24 DIAGNOSIS — Z72.0 TOBACCO ABUSE: ICD-10-CM

## 2023-05-24 DIAGNOSIS — Z00.00 PHYSICAL EXAM: Primary | ICD-10-CM

## 2023-05-24 DIAGNOSIS — D57.3 SICKLE CELL TRAIT (HCC): ICD-10-CM

## 2023-05-24 DIAGNOSIS — Z83.3 FAMILY HISTORY OF DIABETES MELLITUS: ICD-10-CM

## 2023-05-24 DIAGNOSIS — I10 PRIMARY HYPERTENSION: ICD-10-CM

## 2023-05-24 DIAGNOSIS — F10.10 ALCOHOL ABUSE: ICD-10-CM

## 2023-05-24 PROCEDURE — 99214 OFFICE O/P EST MOD 30 MIN: CPT | Performed by: INTERNAL MEDICINE

## 2023-05-24 PROCEDURE — 36415 COLL VENOUS BLD VENIPUNCTURE: CPT | Performed by: INTERNAL MEDICINE

## 2023-05-24 PROCEDURE — 3080F DIAST BP >= 90 MM HG: CPT | Performed by: INTERNAL MEDICINE

## 2023-05-24 PROCEDURE — 3077F SYST BP >= 140 MM HG: CPT | Performed by: INTERNAL MEDICINE

## 2023-05-24 PROCEDURE — 99395 PREV VISIT EST AGE 18-39: CPT | Performed by: INTERNAL MEDICINE

## 2023-05-24 SDOH — ECONOMIC STABILITY: INCOME INSECURITY: IN THE LAST 12 MONTHS, WAS THERE A TIME WHEN YOU WERE NOT ABLE TO PAY THE MORTGAGE OR RENT ON TIME?: NO

## 2023-05-24 SDOH — ECONOMIC STABILITY: FOOD INSECURITY: WITHIN THE PAST 12 MONTHS, THE FOOD YOU BOUGHT JUST DIDN'T LAST AND YOU DIDN'T HAVE MONEY TO GET MORE.: NEVER TRUE

## 2023-05-24 SDOH — HEALTH STABILITY: PHYSICAL HEALTH: ON AVERAGE, HOW MANY MINUTES DO YOU ENGAGE IN EXERCISE AT THIS LEVEL?: 0 MIN

## 2023-05-24 SDOH — ECONOMIC STABILITY: TRANSPORTATION INSECURITY
IN THE PAST 12 MONTHS, HAS THE LACK OF TRANSPORTATION KEPT YOU FROM MEDICAL APPOINTMENTS OR FROM GETTING MEDICATIONS?: NO

## 2023-05-24 SDOH — ECONOMIC STABILITY: HOUSING INSECURITY
IN THE LAST 12 MONTHS, WAS THERE A TIME WHEN YOU DID NOT HAVE A STEADY PLACE TO SLEEP OR SLEPT IN A SHELTER (INCLUDING NOW)?: NO

## 2023-05-24 SDOH — ECONOMIC STABILITY: TRANSPORTATION INSECURITY
IN THE PAST 12 MONTHS, HAS LACK OF TRANSPORTATION KEPT YOU FROM MEETINGS, WORK, OR FROM GETTING THINGS NEEDED FOR DAILY LIVING?: NO

## 2023-05-24 SDOH — ECONOMIC STABILITY: FOOD INSECURITY: WITHIN THE PAST 12 MONTHS, YOU WORRIED THAT YOUR FOOD WOULD RUN OUT BEFORE YOU GOT MONEY TO BUY MORE.: NEVER TRUE

## 2023-05-24 SDOH — HEALTH STABILITY: PHYSICAL HEALTH: ON AVERAGE, HOW MANY DAYS PER WEEK DO YOU ENGAGE IN MODERATE TO STRENUOUS EXERCISE (LIKE A BRISK WALK)?: 0 DAYS

## 2023-05-24 SDOH — ECONOMIC STABILITY: HOUSING INSECURITY: IN THE LAST 12 MONTHS, HOW MANY PLACES HAVE YOU LIVED?: 1

## 2023-05-24 ASSESSMENT — ANXIETY QUESTIONNAIRES
7. FEELING AFRAID AS IF SOMETHING AWFUL MIGHT HAPPEN: 0
5. BEING SO RESTLESS THAT IT IS HARD TO SIT STILL: 0
GAD7 TOTAL SCORE: 0
4. TROUBLE RELAXING: 0
3. WORRYING TOO MUCH ABOUT DIFFERENT THINGS: 0
1. FEELING NERVOUS, ANXIOUS, OR ON EDGE: 0
6. BECOMING EASILY ANNOYED OR IRRITABLE: 0
2. NOT BEING ABLE TO STOP OR CONTROL WORRYING: 0
IF YOU CHECKED OFF ANY PROBLEMS ON THIS QUESTIONNAIRE, HOW DIFFICULT HAVE THESE PROBLEMS MADE IT FOR YOU TO DO YOUR WORK, TAKE CARE OF THINGS AT HOME, OR GET ALONG WITH OTHER PEOPLE: NOT DIFFICULT AT ALL

## 2023-05-24 ASSESSMENT — PATIENT HEALTH QUESTIONNAIRE - PHQ9
SUM OF ALL RESPONSES TO PHQ QUESTIONS 1-9: 0
2. FEELING DOWN, DEPRESSED OR HOPELESS: 0
SUM OF ALL RESPONSES TO PHQ9 QUESTIONS 1 & 2: 0
1. LITTLE INTEREST OR PLEASURE IN DOING THINGS: 0
SUM OF ALL RESPONSES TO PHQ QUESTIONS 1-9: 0

## 2023-05-24 ASSESSMENT — SOCIAL DETERMINANTS OF HEALTH (SDOH)
WITHIN THE LAST YEAR, HAVE YOU BEEN KICKED, HIT, SLAPPED, OR OTHERWISE PHYSICALLY HURT BY YOUR PARTNER OR EX-PARTNER?: NO
WITHIN THE LAST YEAR, HAVE YOU BEEN AFRAID OF YOUR PARTNER OR EX-PARTNER?: NO
WITHIN THE LAST YEAR, HAVE TO BEEN RAPED OR FORCED TO HAVE ANY KIND OF SEXUAL ACTIVITY BY YOUR PARTNER OR EX-PARTNER?: NO
IN A TYPICAL WEEK, HOW MANY TIMES DO YOU TALK ON THE PHONE WITH FAMILY, FRIENDS, OR NEIGHBORS?: MORE THAN THREE TIMES A WEEK
HOW HARD IS IT FOR YOU TO PAY FOR THE VERY BASICS LIKE FOOD, HOUSING, MEDICAL CARE, AND HEATING?: NOT VERY HARD
HOW OFTEN DO YOU ATTENT MEETINGS OF THE CLUB OR ORGANIZATION YOU BELONG TO?: 1 TO 4 TIMES PER YEAR
HOW OFTEN DO YOU ATTEND CHURCH OR RELIGIOUS SERVICES?: MORE THAN 4 TIMES PER YEAR
DO YOU BELONG TO ANY CLUBS OR ORGANIZATIONS SUCH AS CHURCH GROUPS UNIONS, FRATERNAL OR ATHLETIC GROUPS, OR SCHOOL GROUPS?: YES
WITHIN THE LAST YEAR, HAVE YOU BEEN HUMILIATED OR EMOTIONALLY ABUSED IN OTHER WAYS BY YOUR PARTNER OR EX-PARTNER?: NO
HOW OFTEN DO YOU GET TOGETHER WITH FRIENDS OR RELATIVES?: TWICE A WEEK

## 2023-05-24 ASSESSMENT — LIFESTYLE VARIABLES
HOW OFTEN DO YOU HAVE A DRINK CONTAINING ALCOHOL: 4 OR MORE TIMES A WEEK
HOW MANY STANDARD DRINKS CONTAINING ALCOHOL DO YOU HAVE ON A TYPICAL DAY: 3 OR 4

## 2023-05-31 VITALS
DIASTOLIC BLOOD PRESSURE: 95 MMHG | BODY MASS INDEX: 45.57 KG/M2 | WEIGHT: 273.5 LBS | SYSTOLIC BLOOD PRESSURE: 150 MMHG | OXYGEN SATURATION: 98 % | HEART RATE: 105 BPM | TEMPERATURE: 98 F | HEIGHT: 65 IN | RESPIRATION RATE: 18 BRPM

## 2024-05-01 ENCOUNTER — HOSPITAL ENCOUNTER (EMERGENCY)
Facility: HOSPITAL | Age: 39
Discharge: HOME | End: 2024-05-01
Attending: EMERGENCY MEDICINE
Payer: MEDICAID

## 2024-05-01 VITALS
SYSTOLIC BLOOD PRESSURE: 137 MMHG | BODY MASS INDEX: 46.65 KG/M2 | HEIGHT: 65 IN | TEMPERATURE: 97.9 F | RESPIRATION RATE: 18 BRPM | OXYGEN SATURATION: 98 % | WEIGHT: 280 LBS | DIASTOLIC BLOOD PRESSURE: 103 MMHG | HEART RATE: 85 BPM

## 2024-05-01 DIAGNOSIS — R60.0 PERIPHERAL EDEMA: Primary | ICD-10-CM

## 2024-05-01 LAB
ALBUMIN SERPL-MCNC: 3.4 G/DL (ref 3.5–5)
ALBUMIN/GLOB SERPL: 0.9 (ref 1.1–2.2)
ALP SERPL-CCNC: 86 U/L (ref 45–117)
ALT SERPL-CCNC: 32 U/L (ref 12–78)
ANION GAP SERPL CALC-SCNC: 7 MMOL/L (ref 5–15)
AST SERPL-CCNC: 17 U/L (ref 15–37)
BASOPHILS # BLD: 0 K/UL (ref 0–0.1)
BASOPHILS NFR BLD: 0 % (ref 0–1)
BILIRUB SERPL-MCNC: 0.2 MG/DL (ref 0.2–1)
BUN SERPL-MCNC: 18 MG/DL (ref 6–20)
BUN/CREAT SERPL: 14 (ref 12–20)
CALCIUM SERPL-MCNC: 8.4 MG/DL (ref 8.5–10.1)
CHLORIDE SERPL-SCNC: 106 MMOL/L (ref 97–108)
CO2 SERPL-SCNC: 27 MMOL/L (ref 21–32)
CREAT SERPL-MCNC: 1.26 MG/DL (ref 0.55–1.02)
DIFFERENTIAL METHOD BLD: NORMAL
EOSINOPHIL # BLD: 0.3 K/UL (ref 0–0.4)
EOSINOPHIL NFR BLD: 4 % (ref 0–7)
ERYTHROCYTE [DISTWIDTH] IN BLOOD BY AUTOMATED COUNT: 13.6 % (ref 11.5–14.5)
GLOBULIN SER CALC-MCNC: 3.7 G/DL (ref 2–4)
GLUCOSE SERPL-MCNC: 86 MG/DL (ref 65–100)
HCT VFR BLD AUTO: 39.9 % (ref 35–47)
HGB BLD-MCNC: 13.5 G/DL (ref 11.5–16)
IMM GRANULOCYTES # BLD AUTO: 0 K/UL (ref 0–0.04)
IMM GRANULOCYTES NFR BLD AUTO: 0 % (ref 0–0.5)
LYMPHOCYTES # BLD: 2.5 K/UL (ref 0.8–3.5)
LYMPHOCYTES NFR BLD: 37 % (ref 12–49)
MAGNESIUM SERPL-MCNC: 1.7 MG/DL (ref 1.6–2.4)
MCH RBC QN AUTO: 28.7 PG (ref 26–34)
MCHC RBC AUTO-ENTMCNC: 33.8 G/DL (ref 30–36.5)
MCV RBC AUTO: 84.7 FL (ref 80–99)
MONOCYTES # BLD: 0.8 K/UL (ref 0–1)
MONOCYTES NFR BLD: 12 % (ref 5–13)
NEUTS SEG # BLD: 3.2 K/UL (ref 1.8–8)
NEUTS SEG NFR BLD: 47 % (ref 32–75)
NRBC # BLD: 0 K/UL (ref 0–0.01)
NRBC BLD-RTO: 0 PER 100 WBC
PLATELET # BLD AUTO: 322 K/UL (ref 150–400)
PMV BLD AUTO: 10.2 FL (ref 8.9–12.9)
POTASSIUM SERPL-SCNC: 3.8 MMOL/L (ref 3.5–5.1)
PROT SERPL-MCNC: 7.1 G/DL (ref 6.4–8.2)
RBC # BLD AUTO: 4.71 M/UL (ref 3.8–5.2)
SODIUM SERPL-SCNC: 140 MMOL/L (ref 136–145)
WBC # BLD AUTO: 6.8 K/UL (ref 3.6–11)

## 2024-05-01 PROCEDURE — 80053 COMPREHEN METABOLIC PANEL: CPT

## 2024-05-01 PROCEDURE — 36415 COLL VENOUS BLD VENIPUNCTURE: CPT

## 2024-05-01 PROCEDURE — 85025 COMPLETE CBC W/AUTO DIFF WBC: CPT

## 2024-05-01 PROCEDURE — 99283 EMERGENCY DEPT VISIT LOW MDM: CPT

## 2024-05-01 PROCEDURE — 83735 ASSAY OF MAGNESIUM: CPT

## 2024-05-01 RX ORDER — FUROSEMIDE 20 MG/1
20 TABLET ORAL DAILY
Qty: 5 TABLET | Refills: 0 | Status: SHIPPED | OUTPATIENT
Start: 2024-05-01 | End: 2024-05-06

## 2024-05-01 ASSESSMENT — PAIN SCALES - GENERAL
PAINLEVEL_OUTOF10: 7
PAINLEVEL_OUTOF10: 7

## 2024-05-01 ASSESSMENT — PAIN - FUNCTIONAL ASSESSMENT
PAIN_FUNCTIONAL_ASSESSMENT: 0-10
PAIN_FUNCTIONAL_ASSESSMENT: PREVENTS OR INTERFERES SOME ACTIVE ACTIVITIES AND ADLS

## 2024-05-01 ASSESSMENT — PAIN DESCRIPTION - DESCRIPTORS
DESCRIPTORS: SORE;TENDER
DESCRIPTORS: ACHING

## 2024-05-01 ASSESSMENT — PAIN DESCRIPTION - ORIENTATION
ORIENTATION: RIGHT
ORIENTATION: RIGHT;LEFT

## 2024-05-01 ASSESSMENT — ENCOUNTER SYMPTOMS
VOMITING: 0
SORE THROAT: 0
COUGH: 0

## 2024-05-01 ASSESSMENT — PAIN DESCRIPTION - LOCATION
LOCATION: FOOT
LOCATION: LEG

## 2024-05-02 NOTE — ED PROVIDER NOTES
[facility] EMERGENCY DEPT  EMERGENCY DEPARTMENT ENCOUNTER      Pt Name: Venita Torres  MRN: [id number]  Birthdate 1985  Date of evaluation: 2024  Provider: Jesus Weston MD    CHIEF COMPLAINT       Chief Complaint   Patient presents with    Foot Swelling    Leg Swelling         HISTORY OF PRESENT ILLNESS   (Location/Symptom, Timing/Onset, Context/Setting, Quality, Duration, Modifying Factors, Severity)  Note limiting factors.   38-year-old female presents from urgent care with complaints of bilateral leg swelling.  She is noticed symptoms over the past week or so.  She reports swelling in both of her legs.  Seems to be improved after elevation and she also took a fluid pill few days ago which seemed to help some.  Denies any redness or fevers.  Denies any history of cardiac disease or kidney disease.      The history is provided by the patient.         Review of External Medical Records:     Nursing Notes were reviewed.    REVIEW OF SYSTEMS    (2-9 systems for level 4, 10 or more for level 5)     Review of Systems   Constitutional:  Negative for fatigue.   HENT:  Negative for sore throat.    Eyes:  Negative for visual disturbance.   Respiratory:  Negative for cough.    Cardiovascular:  Negative for palpitations.   Gastrointestinal:  Negative for vomiting.   Genitourinary:  Negative for difficulty urinating.   Musculoskeletal:  Negative for myalgias.   Skin:  Negative for rash.   Neurological:  Negative for weakness.       Except as noted above the remainder of the review of systems was reviewed and negative.       PAST MEDICAL HISTORY     Past Medical History:   Diagnosis Date    Asthma     GERD (gastroesophageal reflux disease)     no medication    Morbid obesity (HCC)     Sickle cell trait (HCC)          SURGICAL HISTORY       Past Surgical History:   Procedure Laterality Date     SECTION  2009     SECTION      HEENT      TEETH EXTRACTION         CURRENT MEDICATIONS

## 2024-05-02 NOTE — ED TRIAGE NOTES
Pt ambulates to treatment area without any gait disturbances.  Pt states that she has bilateral foot swelling that goes up her lower legs.  Pt states this started 8 days ago.  Pt went and saw urgent care and ortho today and was dx with pitting edema and bilateral foot pain.  Pt states that she wants to rule out blood clots.  Pt states that she has some pain in the morning when she wakes up

## 2024-05-06 ENCOUNTER — OFFICE VISIT (OUTPATIENT)
Facility: CLINIC | Age: 39
End: 2024-05-06
Payer: MEDICAID

## 2024-05-06 VITALS
OXYGEN SATURATION: 99 % | HEART RATE: 100 BPM | RESPIRATION RATE: 16 BRPM | SYSTOLIC BLOOD PRESSURE: 157 MMHG | BODY MASS INDEX: 45.82 KG/M2 | WEIGHT: 275 LBS | TEMPERATURE: 98.1 F | HEIGHT: 65 IN | DIASTOLIC BLOOD PRESSURE: 95 MMHG

## 2024-05-06 DIAGNOSIS — R60.9 EDEMA, UNSPECIFIED TYPE: Primary | ICD-10-CM

## 2024-05-06 DIAGNOSIS — Z83.3 FAMILY HISTORY OF DIABETES MELLITUS: ICD-10-CM

## 2024-05-06 DIAGNOSIS — E66.01 MORBID OBESITY (HCC): ICD-10-CM

## 2024-05-06 DIAGNOSIS — J45.20 MILD INTERMITTENT REACTIVE AIRWAY DISEASE WITHOUT COMPLICATION: ICD-10-CM

## 2024-05-06 LAB
EST. AVERAGE GLUCOSE BLD GHB EST-MCNC: 88 MG/DL
HBA1C MFR BLD: 4.7 % (ref 4–5.6)
NT PRO BNP: 1056 PG/ML

## 2024-05-06 PROCEDURE — 99213 OFFICE O/P EST LOW 20 MIN: CPT | Performed by: INTERNAL MEDICINE

## 2024-05-06 PROCEDURE — 3077F SYST BP >= 140 MM HG: CPT | Performed by: INTERNAL MEDICINE

## 2024-05-06 PROCEDURE — 3080F DIAST BP >= 90 MM HG: CPT | Performed by: INTERNAL MEDICINE

## 2024-05-06 RX ORDER — ALBUTEROL SULFATE 90 UG/1
2 AEROSOL, METERED RESPIRATORY (INHALATION) EVERY 4 HOURS PRN
Qty: 18 G | Refills: 6 | Status: SHIPPED | OUTPATIENT
Start: 2024-05-06 | End: 2025-05-05

## 2024-05-06 RX ORDER — LORAZEPAM 0.5 MG/1
0.5 TABLET ORAL EVERY 8 HOURS PRN
Qty: 30 TABLET | Refills: 5 | Status: CANCELLED | OUTPATIENT
Start: 2024-05-06 | End: 2025-05-05

## 2024-05-06 SDOH — ECONOMIC STABILITY: INCOME INSECURITY: HOW HARD IS IT FOR YOU TO PAY FOR THE VERY BASICS LIKE FOOD, HOUSING, MEDICAL CARE, AND HEATING?: VERY HARD

## 2024-05-06 SDOH — ECONOMIC STABILITY: HOUSING INSECURITY
IN THE LAST 12 MONTHS, WAS THERE A TIME WHEN YOU DID NOT HAVE A STEADY PLACE TO SLEEP OR SLEPT IN A SHELTER (INCLUDING NOW)?: YES

## 2024-05-06 SDOH — ECONOMIC STABILITY: FOOD INSECURITY: WITHIN THE PAST 12 MONTHS, THE FOOD YOU BOUGHT JUST DIDN'T LAST AND YOU DIDN'T HAVE MONEY TO GET MORE.: NEVER TRUE

## 2024-05-06 SDOH — ECONOMIC STABILITY: FOOD INSECURITY: WITHIN THE PAST 12 MONTHS, YOU WORRIED THAT YOUR FOOD WOULD RUN OUT BEFORE YOU GOT MONEY TO BUY MORE.: NEVER TRUE

## 2024-05-06 ASSESSMENT — ANXIETY QUESTIONNAIRES
2. NOT BEING ABLE TO STOP OR CONTROL WORRYING: MORE THAN HALF THE DAYS
3. WORRYING TOO MUCH ABOUT DIFFERENT THINGS: MORE THAN HALF THE DAYS
7. FEELING AFRAID AS IF SOMETHING AWFUL MIGHT HAPPEN: MORE THAN HALF THE DAYS
4. TROUBLE RELAXING: MORE THAN HALF THE DAYS
5. BEING SO RESTLESS THAT IT IS HARD TO SIT STILL: MORE THAN HALF THE DAYS
6. BECOMING EASILY ANNOYED OR IRRITABLE: NEARLY EVERY DAY
1. FEELING NERVOUS, ANXIOUS, OR ON EDGE: MORE THAN HALF THE DAYS
IF YOU CHECKED OFF ANY PROBLEMS ON THIS QUESTIONNAIRE, HOW DIFFICULT HAVE THESE PROBLEMS MADE IT FOR YOU TO DO YOUR WORK, TAKE CARE OF THINGS AT HOME, OR GET ALONG WITH OTHER PEOPLE: SOMEWHAT DIFFICULT
GAD7 TOTAL SCORE: 15

## 2024-05-06 ASSESSMENT — PATIENT HEALTH QUESTIONNAIRE - PHQ9
1. LITTLE INTEREST OR PLEASURE IN DOING THINGS: NOT AT ALL
SUM OF ALL RESPONSES TO PHQ9 QUESTIONS 1 & 2: 0
2. FEELING DOWN, DEPRESSED OR HOPELESS: NOT AT ALL
SUM OF ALL RESPONSES TO PHQ QUESTIONS 1-9: 0

## 2024-05-06 NOTE — PATIENT INSTRUCTIONS
[facility] Financial Resources*  (Call 211 if need more resources.)    Medical Care  [facility] Financial [facility]  What they offer: The [employer] Financial Assistance Program helps uninsured patients who do not qualify for government-sponsored health insurance and cannot afford to pay for their medical care. Insured patients may also qualify for assistance based on family income, family size, and medical needs.  Phone Number: [phone]  How to apply for the [employer] Financial Assistance Program:  Option 1: To apply for financial assistance, a patient (or their family or other provider) should fill out the Financial Assistance Application. Copies of the Financial Assistance Application and the FAP may be obtained for free by calling the [facility] customer service department at [phone].  Option 2: The Financial Assistance Application and policy may be obtained for free by downloading a copy from the [employer] website:  https://www.[employer]/patient-resources/financial-assistance  Applications are available in several languages on the website    [facility] Financial Assistance  What they offer: [facility] has a Financial Assistance Policy that provides free or discounted health care to qualified patients.  Website:  https://[employer]/patient-financial/pricing  Phone Number for Patient Benefit Advisors: [phone]    [facility] Financial Assistance  What they offer: Help with understanding a bill, finding out what insurance pays, applying for financial aid, or setting up a payment plan.  Website:  https://[employer]/services/billing-insurance/xrgikwnvr-xlcwyqtnaw-dnippkcrwgw  Financial Counseling Call Center: [phone]    [facility]  What they offer:   Mobile healthcare resources for uninsured patients.  Contact: [phone]        [facility]  What they offer:  Healthcare screenings and vaccines.  Contact: [phone]        [facility] [employer] ([facility])  What they offer:  Mammograms

## 2024-05-10 ENCOUNTER — TELEPHONE (OUTPATIENT)
Facility: CLINIC | Age: 39
End: 2024-05-10

## 2024-05-10 NOTE — TELEPHONE ENCOUNTER
----- Message from Mel Davies sent at 5/10/2024 12:42 PM EDT -----  Subject: Message to Provider    QUESTIONS  Information for Provider? Patient was just in the other day and was   prescribed some medication and nothing was approved please send prior AUTH   and call patient to confirm  ---------------------------------------------------------------------------  --------------  CALL BACK INFO  [phone]; OK to leave message on voicemail  ---------------------------------------------------------------------------  --------------  SCRIPT ANSWERS  Relationship to Patient? Self

## 2024-05-12 DIAGNOSIS — I50.9 CONGESTIVE HEART FAILURE, UNSPECIFIED HF CHRONICITY, UNSPECIFIED HEART FAILURE TYPE (HCC): Primary | ICD-10-CM

## 2024-05-14 PROBLEM — Z83.3 FAMILY HISTORY OF DIABETES MELLITUS: Status: ACTIVE | Noted: 2024-05-14

## 2024-05-14 PROBLEM — R60.9 EDEMA: Status: ACTIVE | Noted: 2024-05-14

## 2024-05-15 NOTE — PROGRESS NOTES
1. As far as the edema is concerned, I am not entirely sure of the etiology.  It was initially suggested she might well have venous insufficiency, but this is not 100%.  I will check a proBNP to see if there is any cardiac contribution.  2. As far as her asthma is concerned, she needs to use her bronchodilators as prescribed.  Most importantly, however, she needs to discontinue use of her combustible products.  3. She is morbidly obese and needs to lose weight.  This can be accomplished by eating meals, eliminating snacks and avoiding the consumption of processed carbohydrates.  4. She has a family history of diabetes and I will await the results of her hemoglobin A1c, particularly in view of her existing morbid obesity.    
Chief Complaint   Patient presents with    Follow-up     \"Have you been to the ER, urgent care clinic since your last visit?  Hospitalized since your last visit?\"    YES - When: approximately 5 days ago.  Where and Why: [facility] edema.    “Have you seen or consulted any other health care providers outside of [facility] since your last visit?”    NO            Click Here for Release of Records Request  
Comes in for return visit for evaluation for swelling of her lower extremities.  It has been fairly progressive and present now for the last 1-2 weeks.  She denies similar symptoms in the past.  She specifically denies any orthopnea, PND or dyspnea on exertion.    She has a history of asthma and uses bronchodilators on a regular basis.    She also has a family history of diabetes mellitus.    She is morbidly obese and there has been no meaningful weight loss of late.      
      REVIEW OF SYSTEMS:  Review of Systems - Negative except   ENT ROS: negative for - headaches, hearing change, nasal congestion, oral lesions, tinnitus, visual changes or   Respiratory ROS: no cough, shortness of breath, or wheezing  Cardiovascular ROS: no chest pain or dyspnea on exertion  Gastrointestinal ROS: no abdominal pain, change in bowel habits, or black or blood  Genito-Urinary ROS: no dysuria, trouble voiding, or hematuria  Musculoskeletal ROS: negative  Neurological ROS: no TIA or stroke symptoms    Social History     Socioeconomic History    Marital status:      Spouse name: None    Number of children: 2    Years of education: 14    Highest education level: Associate degree: academic program   Occupational History    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   Tobacco Use    Smoking status: Some Days     Types: Cigars     Start date: 2022    Smokeless tobacco: Never   Vaping Use    Vaping Use: Never used   Substance and Sexual Activity    Alcohol use: Yes     Alcohol/week: 1.0 standard drink of alcohol     Types: 1 Glasses of wine per week    Drug use: No    Sexual activity: Not Currently   Social History Narrative    Walk in the park     Social Determinants of Health     Financial Resource Strain: High Risk (5/6/2024)    Overall Financial Resource Strain (CARDIA)     Difficulty of Paying Living Expenses: Very hard   Food Insecurity: No Food Insecurity (5/6/2024)    Hunger Vital Sign     Worried About Running Out of Food in the Last Year: Never true     Ran Out of Food in the Last Year: Never true   Transportation Needs: No Transportation Needs (5/6/2024)    PRAPARE - Transportation     Lack of Transportation (Medical): No     Lack of Transportation (Non-Medical): No   Physical Activity: Inactive (5/24/2023)    Exercise Vital Sign     Days of Exercise per Week: 0 days     Minutes of Exercise per Session: 0 min   Stress: Stress Concern Present (5/24/2023)    [facility] [employer]

## 2024-07-30 ENCOUNTER — TELEPHONE (OUTPATIENT)
Age: 39
End: 2024-07-30

## 2024-08-29 ENCOUNTER — TELEPHONE (OUTPATIENT)
Age: 39
End: 2024-08-29

## 2024-08-29 NOTE — TELEPHONE ENCOUNTER
Patient called regarding medical weight loss; verified patient completed orientation; sent new patient paperwork via email

## 2024-11-20 ENCOUNTER — OFFICE VISIT (OUTPATIENT)
Facility: CLINIC | Age: 39
End: 2024-11-20
Payer: COMMERCIAL

## 2024-11-20 VITALS
WEIGHT: 293 LBS | TEMPERATURE: 98 F | HEIGHT: 65 IN | HEART RATE: 86 BPM | RESPIRATION RATE: 16 BRPM | BODY MASS INDEX: 48.82 KG/M2 | OXYGEN SATURATION: 99 % | DIASTOLIC BLOOD PRESSURE: 104 MMHG | SYSTOLIC BLOOD PRESSURE: 158 MMHG

## 2024-11-20 DIAGNOSIS — I10 PRIMARY HYPERTENSION: ICD-10-CM

## 2024-11-20 DIAGNOSIS — I50.9 CONGESTIVE HEART FAILURE, UNSPECIFIED HF CHRONICITY, UNSPECIFIED HEART FAILURE TYPE (HCC): ICD-10-CM

## 2024-11-20 DIAGNOSIS — E66.01 MORBID OBESITY: ICD-10-CM

## 2024-11-20 DIAGNOSIS — F32.9 REACTIVE DEPRESSION: Primary | ICD-10-CM

## 2024-11-20 DIAGNOSIS — J45.20 MILD INTERMITTENT REACTIVE AIRWAY DISEASE WITHOUT COMPLICATION: ICD-10-CM

## 2024-11-20 DIAGNOSIS — F90.8 OTHER SPECIFIED ATTENTION DEFICIT HYPERACTIVITY DISORDER (ADHD): ICD-10-CM

## 2024-11-20 PROCEDURE — 3074F SYST BP LT 130 MM HG: CPT | Performed by: INTERNAL MEDICINE

## 2024-11-20 PROCEDURE — 99214 OFFICE O/P EST MOD 30 MIN: CPT | Performed by: INTERNAL MEDICINE

## 2024-11-20 PROCEDURE — 3080F DIAST BP >= 90 MM HG: CPT | Performed by: INTERNAL MEDICINE

## 2024-11-20 RX ORDER — BUPROPION HYDROCHLORIDE 150 MG/1
150 TABLET ORAL DAILY
Qty: 30 TABLET | Refills: 11 | Status: SHIPPED | OUTPATIENT
Start: 2024-11-20

## 2024-11-20 RX ORDER — ALBUTEROL SULFATE 90 UG/1
2 INHALANT RESPIRATORY (INHALATION) EVERY 4 HOURS PRN
Qty: 18 G | Refills: 6 | Status: SHIPPED | OUTPATIENT
Start: 2024-11-20 | End: 2025-11-19

## 2024-11-20 SDOH — ECONOMIC STABILITY: INCOME INSECURITY: HOW HARD IS IT FOR YOU TO PAY FOR THE VERY BASICS LIKE FOOD, HOUSING, MEDICAL CARE, AND HEATING?: NOT HARD AT ALL

## 2024-11-20 SDOH — ECONOMIC STABILITY: FOOD INSECURITY: WITHIN THE PAST 12 MONTHS, THE FOOD YOU BOUGHT JUST DIDN'T LAST AND YOU DIDN'T HAVE MONEY TO GET MORE.: NEVER TRUE

## 2024-11-20 SDOH — ECONOMIC STABILITY: FOOD INSECURITY: WITHIN THE PAST 12 MONTHS, YOU WORRIED THAT YOUR FOOD WOULD RUN OUT BEFORE YOU GOT MONEY TO BUY MORE.: NEVER TRUE

## 2024-11-20 ASSESSMENT — ANXIETY QUESTIONNAIRES
GAD7 TOTAL SCORE: 0
2. NOT BEING ABLE TO STOP OR CONTROL WORRYING: NOT AT ALL
7. FEELING AFRAID AS IF SOMETHING AWFUL MIGHT HAPPEN: NOT AT ALL
IF YOU CHECKED OFF ANY PROBLEMS ON THIS QUESTIONNAIRE, HOW DIFFICULT HAVE THESE PROBLEMS MADE IT FOR YOU TO DO YOUR WORK, TAKE CARE OF THINGS AT HOME, OR GET ALONG WITH OTHER PEOPLE: NOT DIFFICULT AT ALL
5. BEING SO RESTLESS THAT IT IS HARD TO SIT STILL: NOT AT ALL
1. FEELING NERVOUS, ANXIOUS, OR ON EDGE: NOT AT ALL
6. BECOMING EASILY ANNOYED OR IRRITABLE: NOT AT ALL
4. TROUBLE RELAXING: NOT AT ALL
3. WORRYING TOO MUCH ABOUT DIFFERENT THINGS: NOT AT ALL

## 2024-11-20 ASSESSMENT — PATIENT HEALTH QUESTIONNAIRE - PHQ9
SUM OF ALL RESPONSES TO PHQ QUESTIONS 1-9: 0
SUM OF ALL RESPONSES TO PHQ9 QUESTIONS 1 & 2: 0
1. LITTLE INTEREST OR PLEASURE IN DOING THINGS: NOT AT ALL
SUM OF ALL RESPONSES TO PHQ QUESTIONS 1-9: 0
2. FEELING DOWN, DEPRESSED OR HOPELESS: NOT AT ALL

## 2024-11-22 ENCOUNTER — TELEPHONE (OUTPATIENT)
Facility: CLINIC | Age: 39
End: 2024-11-22

## 2024-11-22 LAB
ANION GAP SERPL CALC-SCNC: 5 MMOL/L (ref 2–12)
BUN SERPL-MCNC: 14 MG/DL (ref 6–20)
BUN/CREAT SERPL: 14 (ref 12–20)
CALCIUM SERPL-MCNC: 9.4 MG/DL (ref 8.5–10.1)
CHLORIDE SERPL-SCNC: 108 MMOL/L (ref 97–108)
CO2 SERPL-SCNC: 27 MMOL/L (ref 21–32)
CREAT SERPL-MCNC: 0.99 MG/DL (ref 0.55–1.02)
CREAT UR-MCNC: 63.1 MG/DL
GLUCOSE SERPL-MCNC: 93 MG/DL (ref 65–100)
NT PRO BNP: 611 PG/ML
POTASSIUM SERPL-SCNC: 5 MMOL/L (ref 3.5–5.1)
PROT UR-MCNC: 14 MG/DL (ref 0–11.9)
PROT/CREAT UR-RTO: 0.2
SODIUM SERPL-SCNC: 140 MMOL/L (ref 136–145)

## 2024-11-22 NOTE — TELEPHONE ENCOUNTER
Patient given rx for ambien 5mg 1 tab daily x 2 weeks then 2 tabs daily thereafter #60 x 11 refills [facility].

## 2024-11-23 RX ORDER — AMLODIPINE BESYLATE 5 MG/1
5 TABLET ORAL DAILY
Qty: 30 TABLET | Refills: 1 | Status: SHIPPED | OUTPATIENT
Start: 2024-11-23

## 2024-11-30 PROBLEM — F90.8 OTHER SPECIFIED ATTENTION DEFICIT HYPERACTIVITY DISORDER (ADHD): Status: ACTIVE | Noted: 2024-11-30

## 2024-11-30 RX ORDER — FLUTICASONE FUROATE, UMECLIDINIUM BROMIDE AND VILANTEROL TRIFENATATE 200; 62.5; 25 UG/1; UG/1; UG/1
1 POWDER RESPIRATORY (INHALATION) DAILY
Qty: 60 EACH | Refills: 4 | Status: SHIPPED | OUTPATIENT
Start: 2024-11-30

## 2024-11-30 NOTE — PROGRESS NOTES
Chief Complaint   Patient presents with    Follow-up     Patient states \" she is here for a follow-up. Due to concerns with her blood pressure. Patient states \" she is doing a low calorie diet, exercising, and walking more,and nutrition meal plan \"    \"Have you been to the ER, urgent care clinic since your last visit?  Hospitalized since your last visit?\"    NO    “Have you seen or consulted any other health care providers outside our system since your last visit?”    NO    Have you had a mammogram?”   NO    No breast cancer screening on file            
Resource Strain (CARDIA)     Difficulty of Paying Living Expenses: Not hard at all   Food Insecurity: No Food Insecurity (11/20/2024)    Hunger Vital Sign     Worried About Running Out of Food in the Last Year: Never true     Ran Out of Food in the Last Year: Never true   Transportation Needs: Unknown (11/20/2024)    PRAPARE - Transportation     Lack of Transportation (Non-Medical): No   Physical Activity: Inactive (5/24/2023)    Exercise Vital Sign     Days of Exercise per Week: 0 days     Minutes of Exercise per Session: 0 min   Stress: Stress Concern Present (5/24/2023)    [employer] - Occupational Stress Questionnaire     Feeling of Stress : Very much   Social Connections: Moderately Integrated (5/24/2023)    Social Connection and Isolation Panel [NHANES]     Frequency of Communication with Friends and Family: More than three times a week     Frequency of Social Gatherings with Friends and Family: Twice a week     Attends Buddhism Services: More than 4 times per year     Active Member of Clubs or Organizations: Yes     Attends Club or Organization Meetings: 1 to 4 times per year     Marital Status:    Intimate Partner Violence: Not At Risk (5/24/2023)    Humiliation, Afraid, Rape, and Kick questionnaire     Fear of Current or Ex-Partner: No     Emotionally Abused: No     Physically Abused: No     Sexually Abused: No   Housing Stability: Unknown (11/20/2024)    Housing Stability Vital Sign     Homeless in the Last Year: No     Family History   Problem Relation Age of Onset    Diabetes Mother     Asthma Son     No Known Problems Sister     Hypertension Maternal Grandmother     Hypertension Father     Asthma Daughter     No Known Problems Sister     Anesth Problems Neg Hx        OBJECTIVE:    BP (!) 158/104 (Site: Right Upper Arm, Position: Sitting, Cuff Size: Large Adult)   Pulse 86   Temp 98 °F (36.7 °C) (Oral)   Resp 16   Ht 1.651 m (5' 5\")   Wt (!) 137.3 kg (302 lb

## 2024-12-05 ENCOUNTER — TELEPHONE (OUTPATIENT)
Facility: CLINIC | Age: 39
End: 2024-12-05

## 2025-03-31 ENCOUNTER — OFFICE VISIT (OUTPATIENT)
Age: 40
End: 2025-03-31

## 2025-03-31 VITALS
TEMPERATURE: 98.2 F | HEART RATE: 103 BPM | WEIGHT: 293 LBS | SYSTOLIC BLOOD PRESSURE: 123 MMHG | RESPIRATION RATE: 18 BRPM | BODY MASS INDEX: 52.42 KG/M2 | OXYGEN SATURATION: 99 % | DIASTOLIC BLOOD PRESSURE: 79 MMHG

## 2025-03-31 DIAGNOSIS — J45.901 ASTHMA WITH ALLERGIC RHINITIS WITH ACUTE EXACERBATION, UNSPECIFIED ASTHMA SEVERITY: Primary | ICD-10-CM

## 2025-03-31 DIAGNOSIS — J30.2 SEASONAL ALLERGIES: ICD-10-CM

## 2025-03-31 RX ORDER — PREDNISONE 20 MG/1
40 TABLET ORAL DAILY
Qty: 10 TABLET | Refills: 0 | Status: SHIPPED | OUTPATIENT
Start: 2025-03-31 | End: 2025-04-05

## 2025-03-31 RX ORDER — BENZONATATE 200 MG/1
200 CAPSULE ORAL 3 TIMES DAILY PRN
Qty: 30 CAPSULE | Refills: 0 | Status: SHIPPED | OUTPATIENT
Start: 2025-03-31 | End: 2025-04-10

## 2025-03-31 RX ORDER — IPRATROPIUM BROMIDE AND ALBUTEROL SULFATE 2.5; .5 MG/3ML; MG/3ML
1 SOLUTION RESPIRATORY (INHALATION) ONCE
Status: COMPLETED | OUTPATIENT
Start: 2025-03-31 | End: 2025-03-31

## 2025-03-31 RX ADMIN — IPRATROPIUM BROMIDE AND ALBUTEROL SULFATE 1 DOSE: 2.5; .5 SOLUTION RESPIRATORY (INHALATION) at 17:16

## 2025-03-31 NOTE — PATIENT INSTRUCTIONS
Take prednisone as directed until completed.  See PCP for refill of albuterol and further assessment for allergies and asthma  Use saline (saltwater) nasal washes. This can help keep your nasal passages open and wash out mucus and allergens.  You can buy saline nose washes at a grocery store or drugstore. Follow the instructions on the package.  Try a decongestant nasal spray like oxymetazoline (Afrin) Do not use it for more than 3 days in a row. Using it for more than 3 days can make your congestion worse.  If needed, take an over-the-counter pain medicine, such as acetaminophen (Tylenol), ibuprofen (Advil, Motrin), or naproxen (Aleve). Read and follow all instructions on the label.  Be careful when taking over-the-counter cold or influenza (flu) medicines and Tylenol at the same time. Many of these medicines have acetaminophen, which is Tylenol. Read the labels to make sure that you are not taking more than the recommended dose. Too much acetaminophen (Tylenol) can be harmful.  Try a steroid nasal spray daily such as Flonase as well as antihistamine such as Claritin or Zyrtec.   Breathe warm, moist air. You can use a steamy shower, a hot bath, or a sink filled with hot water. Avoid cold, dry air. Using a humidifier in your home may help. Follow the directions for cleaning the machine.  Do not smoke or allow others to smoke around you. If you need help quitting, talk to your doctor about stop-smoking programs and medicines. These can increase your chances of quitting for good.  Return to Clinic if mild worsening or changes in symptoms.  Report to ER if high or persistent fever, dehydration, new or severe worsening of symptoms.  Please follow up with PCP for persistent or recurrent symptoms.

## 2025-03-31 NOTE — PROGRESS NOTES
Patient Name: Venita Torres   YOB: 1985   Patient Status: New patient,   Chief Complaint: Asthma (C/o asthma/allergies. Sx consist of running nose, breathing issues and headaches. Sx  4 days.)      ____________________________________________________________________________________________    External Records Reviewed: None    Limitation to History: None    Outside Historian: None    SUBJECTIVE/OBJECTIVE:  Venita Torres is a 39 y.o. female presents with complaint of runny nose, headache, and congestion for 3-4 days with cough for 2 days. She reports wheezing and feeling like her breathing is off.  She reports history of asthma and says she has been using her albuterol inhaler with some relief.  She just started taking Zyrtec today with history of seasonal allergies and asthma that she says usually gets \"kicked off\" due to the allergies.  The patient denies fever, chest pain, and GI Symptoms .   No other acute symptoms reported at this time.          PAST MEDICAL HISTORY:   Medical: Pt  has a past medical history of Asthma, GERD (gastroesophageal reflux disease), Morbid obesity, and Sickle cell trait.  Surgical: Pt  has a past surgical history that includes heent;  section (); and  section ().  Family: Pt family history includes Asthma in her daughter and son; Diabetes in her mother; Hypertension in her father and maternal grandmother; No Known Problems in her sister and sister.  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

## 2025-07-03 ENCOUNTER — TELEPHONE (OUTPATIENT)
Age: 40
End: 2025-07-03

## 2025-07-03 NOTE — TELEPHONE ENCOUNTER
----- Message from Tonie GOMEZ sent at 7/3/2025  3:18 PM EDT -----  Regarding: [facility] Appointment Request  [facility] Appointment Request    Patient needs appointment for [facility] Appointment Type: New to Provider.    Patient Requested Dates(s): Any date in the month of July or August 2025  Patient Requested Time: Anytime   Provider Name: Thea Cool    Reason for Appointment Request: Established Patient - No appointments available during search  --------------------------------------------------------------------------------------------------------------------------    Relationship to Patient: Self     Call Back Information: OK to leave message on voicemail  Preferred Call Back Number:  [phone]

## 2025-08-25 ENCOUNTER — OFFICE VISIT (OUTPATIENT)
Facility: CLINIC | Age: 40
End: 2025-08-25
Payer: COMMERCIAL

## 2025-08-25 VITALS
OXYGEN SATURATION: 98 % | DIASTOLIC BLOOD PRESSURE: 113 MMHG | BODY MASS INDEX: 48.82 KG/M2 | HEART RATE: 105 BPM | RESPIRATION RATE: 18 BRPM | HEIGHT: 65 IN | WEIGHT: 293 LBS | SYSTOLIC BLOOD PRESSURE: 163 MMHG | TEMPERATURE: 98.2 F

## 2025-08-25 DIAGNOSIS — Z83.3 FAMILY HISTORY OF DIABETES MELLITUS: ICD-10-CM

## 2025-08-25 DIAGNOSIS — R35.0 URINARY FREQUENCY: ICD-10-CM

## 2025-08-25 DIAGNOSIS — I10 PRIMARY HYPERTENSION: Primary | ICD-10-CM

## 2025-08-25 DIAGNOSIS — R73.09 ABNORMAL BLOOD SUGAR: ICD-10-CM

## 2025-08-25 DIAGNOSIS — E66.01 MORBID OBESITY (HCC): ICD-10-CM

## 2025-08-25 DIAGNOSIS — R79.89 ELEVATED BRAIN NATRIURETIC PEPTIDE (BNP) LEVEL: ICD-10-CM

## 2025-08-25 DIAGNOSIS — I10 PRIMARY HYPERTENSION: ICD-10-CM

## 2025-08-25 DIAGNOSIS — R06.02 SHORTNESS OF BREATH: ICD-10-CM

## 2025-08-25 PROBLEM — J96.01 ACUTE HYPOXEMIC RESPIRATORY FAILURE (HCC): Status: RESOLVED | Noted: 2021-10-03 | Resolved: 2025-08-25

## 2025-08-25 PROBLEM — J12.82 PNEUMONIA DUE TO COVID-19 VIRUS: Status: RESOLVED | Noted: 2021-10-03 | Resolved: 2025-08-25

## 2025-08-25 PROBLEM — F41.9 ANXIETY DISORDER: Status: ACTIVE | Noted: 2025-08-25

## 2025-08-25 PROBLEM — U07.1 PNEUMONIA DUE TO COVID-19 VIRUS: Status: RESOLVED | Noted: 2021-10-03 | Resolved: 2025-08-25

## 2025-08-25 PROCEDURE — 3080F DIAST BP >= 90 MM HG: CPT | Performed by: FAMILY MEDICINE

## 2025-08-25 PROCEDURE — 3077F SYST BP >= 140 MM HG: CPT | Performed by: FAMILY MEDICINE

## 2025-08-25 PROCEDURE — 99214 OFFICE O/P EST MOD 30 MIN: CPT | Performed by: FAMILY MEDICINE

## 2025-08-25 RX ORDER — AMLODIPINE BESYLATE 10 MG/1
10 TABLET ORAL DAILY
Qty: 30 TABLET | Refills: 1 | Status: SHIPPED | OUTPATIENT
Start: 2025-08-25

## 2025-08-25 SDOH — ECONOMIC STABILITY: FOOD INSECURITY: WITHIN THE PAST 12 MONTHS, THE FOOD YOU BOUGHT JUST DIDN'T LAST AND YOU DIDN'T HAVE MONEY TO GET MORE.: NEVER TRUE

## 2025-08-25 SDOH — ECONOMIC STABILITY: FOOD INSECURITY: WITHIN THE PAST 12 MONTHS, YOU WORRIED THAT YOUR FOOD WOULD RUN OUT BEFORE YOU GOT MONEY TO BUY MORE.: NEVER TRUE

## 2025-08-25 ASSESSMENT — PATIENT HEALTH QUESTIONNAIRE - PHQ9
6. FEELING BAD ABOUT YOURSELF - OR THAT YOU ARE A FAILURE OR HAVE LET YOURSELF OR YOUR FAMILY DOWN: NOT AT ALL
4. FEELING TIRED OR HAVING LITTLE ENERGY: NOT AT ALL
SUM OF ALL RESPONSES TO PHQ QUESTIONS 1-9: 0
10. IF YOU CHECKED OFF ANY PROBLEMS, HOW DIFFICULT HAVE THESE PROBLEMS MADE IT FOR YOU TO DO YOUR WORK, TAKE CARE OF THINGS AT HOME, OR GET ALONG WITH OTHER PEOPLE: NOT DIFFICULT AT ALL
2. FEELING DOWN, DEPRESSED OR HOPELESS: NOT AT ALL
3. TROUBLE FALLING OR STAYING ASLEEP: NOT AT ALL
8. MOVING OR SPEAKING SO SLOWLY THAT OTHER PEOPLE COULD HAVE NOTICED. OR THE OPPOSITE, BEING SO FIGETY OR RESTLESS THAT YOU HAVE BEEN MOVING AROUND A LOT MORE THAN USUAL: NOT AT ALL
SUM OF ALL RESPONSES TO PHQ QUESTIONS 1-9: 0
1. LITTLE INTEREST OR PLEASURE IN DOING THINGS: NOT AT ALL
SUM OF ALL RESPONSES TO PHQ QUESTIONS 1-9: 0
5. POOR APPETITE OR OVEREATING: NOT AT ALL
7. TROUBLE CONCENTRATING ON THINGS, SUCH AS READING THE NEWSPAPER OR WATCHING TELEVISION: NOT AT ALL
SUM OF ALL RESPONSES TO PHQ QUESTIONS 1-9: 0
9. THOUGHTS THAT YOU WOULD BE BETTER OFF DEAD, OR OF HURTING YOURSELF: NOT AT ALL

## 2025-08-26 LAB
ALBUMIN SERPL-MCNC: 4 G/DL (ref 3.5–5.2)
ALBUMIN/GLOB SERPL: 1.1 (ref 1.1–2.2)
ALP SERPL-CCNC: 123 U/L (ref 35–104)
ALT SERPL-CCNC: 37 U/L (ref 10–35)
ANION GAP SERPL CALC-SCNC: 13 MMOL/L (ref 2–14)
APPEARANCE UR: CLEAR
AST SERPL-CCNC: 35 U/L (ref 10–35)
BACTERIA URNS QL MICRO: ABNORMAL /HPF
BASOPHILS # BLD: 0.08 K/UL (ref 0–0.1)
BASOPHILS NFR BLD: 0.8 % (ref 0–1)
BILIRUB SERPL-MCNC: 0.3 MG/DL (ref 0–1.2)
BILIRUB UR QL: NEGATIVE
BUN SERPL-MCNC: 15 MG/DL (ref 6–20)
BUN/CREAT SERPL: 14 (ref 12–20)
CALCIUM SERPL-MCNC: 9.6 MG/DL (ref 8.6–10)
CHLORIDE SERPL-SCNC: 102 MMOL/L (ref 98–107)
CO2 SERPL-SCNC: 25 MMOL/L (ref 20–29)
COLOR UR: ABNORMAL
CREAT SERPL-MCNC: 1.05 MG/DL (ref 0.6–1)
DIFFERENTIAL METHOD BLD: ABNORMAL
EOSINOPHIL # BLD: 0.12 K/UL (ref 0–0.4)
EOSINOPHIL NFR BLD: 1.3 % (ref 0–7)
EPITH CASTS URNS QL MICRO: ABNORMAL /LPF
ERYTHROCYTE [DISTWIDTH] IN BLOOD BY AUTOMATED COUNT: 13.4 % (ref 11.5–14.5)
EST. AVERAGE GLUCOSE BLD GHB EST-MCNC: 109 MG/DL
GLOBULIN SER CALC-MCNC: 3.6 G/DL (ref 2–4)
GLUCOSE SERPL-MCNC: 79 MG/DL (ref 65–100)
GLUCOSE UR STRIP.AUTO-MCNC: NEGATIVE MG/DL
HBA1C MFR BLD: 5.4 % (ref 4–5.6)
HCT VFR BLD AUTO: 43.7 % (ref 35–47)
HGB BLD-MCNC: 14.4 G/DL (ref 11.5–16)
HGB UR QL STRIP: NEGATIVE
HYALINE CASTS URNS QL MICRO: ABNORMAL /LPF (ref 0–5)
IMM GRANULOCYTES # BLD AUTO: 0.03 K/UL (ref 0–0.04)
IMM GRANULOCYTES NFR BLD AUTO: 0.3 % (ref 0–0.5)
KETONES UR QL STRIP.AUTO: NEGATIVE MG/DL
LEUKOCYTE ESTERASE UR QL STRIP.AUTO: NEGATIVE
LYMPHOCYTES # BLD: 2.71 K/UL (ref 0.8–3.5)
LYMPHOCYTES NFR BLD: 28.5 % (ref 12–49)
MCH RBC QN AUTO: 26.9 PG (ref 26–34)
MCHC RBC AUTO-ENTMCNC: 33 G/DL (ref 30–36.5)
MCV RBC AUTO: 81.7 FL (ref 80–99)
MONOCYTES # BLD: 0.74 K/UL (ref 0–1)
MONOCYTES NFR BLD: 7.8 % (ref 5–13)
NEUTS SEG # BLD: 5.82 K/UL (ref 1.8–8)
NEUTS SEG NFR BLD: 61.3 % (ref 32–75)
NITRITE UR QL STRIP.AUTO: NEGATIVE
NRBC # BLD: 0 K/UL (ref 0–0.01)
NRBC BLD-RTO: 0 PER 100 WBC
NT PRO BNP: 323 PG/ML (ref 0–125)
PH UR STRIP: 6.5 (ref 5–8)
PLATELET # BLD AUTO: 380 K/UL (ref 150–400)
PMV BLD AUTO: 10.5 FL (ref 8.9–12.9)
POTASSIUM SERPL-SCNC: 4.2 MMOL/L (ref 3.5–5.1)
PROT SERPL-MCNC: 7.6 G/DL (ref 6.4–8.3)
PROT UR STRIP-MCNC: 30 MG/DL
RBC # BLD AUTO: 5.35 M/UL (ref 3.8–5.2)
RBC #/AREA URNS HPF: ABNORMAL /HPF (ref 0–5)
SODIUM SERPL-SCNC: 140 MMOL/L (ref 136–145)
SP GR UR REFRACTOMETRY: 1.02 (ref 1–1.03)
T4 FREE SERPL-MCNC: 1.3 NG/DL (ref 0.9–1.6)
TSH, 3RD GENERATION: 0.75 UIU/ML (ref 0.27–4.2)
URINE CULTURE IF INDICATED: ABNORMAL
UROBILINOGEN UR QL STRIP.AUTO: 0.2 EU/DL (ref 0.2–1)
WBC # BLD AUTO: 9.5 K/UL (ref 3.6–11)
WBC URNS QL MICRO: ABNORMAL /HPF (ref 0–4)

## (undated) DEVICE — TOTAL TRAY, DB, 100% SILI FOLEY, 16FR 10: Brand: MEDLINE

## (undated) DEVICE — STERILE POLYISOPRENE POWDER-FREE SURGICAL GLOVES WITH EMOLLIENT COATING: Brand: PROTEXIS

## (undated) DEVICE — TROCAR: Brand: KII® SLEEVE

## (undated) DEVICE — REM POLYHESIVE ADULT PATIENT RETURN ELECTRODE: Brand: VALLEYLAB

## (undated) DEVICE — SUTURE MCRYL SZ 4-0 L27IN ABSRB UD L19MM PS-2 1/2 CIR PRIM Y426H

## (undated) DEVICE — SURGICAL PROCEDURE PACK GYN LAPAROSCOPY CUST SMH LF

## (undated) DEVICE — SCISSORS ENDOSCP DIA5MM CRV MPLR CAUT W/ RATCH HNDL

## (undated) DEVICE — AGENT HEMSTAT W4XL4IN OXIDIZED REGENERATED CELOS ABSRB SFT

## (undated) DEVICE — Z DUP USE 2275493 DRESSING ALGINATE POST OPERATIVE 10X3.5 IN RECT PRIMASEAL

## (undated) DEVICE — SUTURE VCRL SZ 2-0 L27IN ABSRB UD L26MM SH 1/2 CIR J417H

## (undated) DEVICE — 40418 TRENDELENBURG ONE-STEP ARM PROTECTORS LARGE (1 PAIR): Brand: 40418 TRENDELENBURG ONE-STEP ARM PROTECTORS LARGE (1 PAIR)

## (undated) DEVICE — STRAP,POSITIONING,KNEE/BODY,FOAM,4X60": Brand: MEDLINE

## (undated) DEVICE — PAD,SANITARY,11 IN,MAXI,N-STRL,IND WRAP: Brand: MEDLINE

## (undated) DEVICE — VISUALIZATION SYSTEM: Brand: CLEARIFY

## (undated) DEVICE — SYR 50ML LR LCK 1ML GRAD NSAF --

## (undated) DEVICE — Device

## (undated) DEVICE — PREP SKN CHLRAPRP APL 26ML STR --

## (undated) DEVICE — FILTER SMK EVAC FLO CLR MEGADYNE

## (undated) DEVICE — NEEDLE HYPO 22GA L1.5IN BLK S STL HUB POLYPR SHLD REG BVL

## (undated) DEVICE — SEALER ENDOSCP L37CM NANO COAT BLNT TIP LAP DIV

## (undated) DEVICE — SUTURE PDS II SZ 1 L27IN ABSRB VLT CT-1 L36MM 1/2 CIR Z341H

## (undated) DEVICE — LAPAROSCOPIC TROCAR SLEEVE/SINGLE USE: Brand: KII® OPTICAL ACCESS SYSTEM

## (undated) DEVICE — DERMABOND SKIN ADH 0.7ML -- DERMABOND ADVANCED 12/BX

## (undated) DEVICE — STERILE NEOPRENE POWDER-FREE SURGICAL GLOVES WITH NITRILE COATING: Brand: PROTEXIS

## (undated) DEVICE — VCARE MEDIUM, UTERINE MANIPULATOR, VAGINAL-CERVICAL-AHLUWALIA'S-RETRACTOR-ELEVATOR: Brand: VCARE

## (undated) DEVICE — INTENDED FOR TISSUE SEPARATION, AND OTHER PROCEDURES THAT REQUIRE A SHARP SURGICAL BLADE TO PUNCTURE OR CUT.: Brand: BARD-PARKER ® CARBON RIB-BACK BLADES

## (undated) DEVICE — SUTURE VCRL SZ 0 L27IN ABSRB UD L36MM CT-1 1/2 CIR J260H

## (undated) DEVICE — SUTURE VCRL SZ 3-0 L27IN ABSRB UD L26MM SH 1/2 CIR J416H

## (undated) DEVICE — 40580 - THE PINK PAD - ADVANCED TRENDELENBURG POSITIONING KIT: Brand: 40580 - THE PINK PAD - ADVANCED TRENDELENBURG POSITIONING KIT

## (undated) DEVICE — TROCARS: Brand: KII® BALLOON BLUNT TIP SYSTEM

## (undated) DEVICE — DRAPE,REIN 53X77,STERILE: Brand: MEDLINE

## (undated) DEVICE — SOLUTION IV 1000ML 0.9% SOD CHL

## (undated) DEVICE — TRAY PREP DRY W/ PREM GLV 2 APPL 6 SPNG 2 UNDPD 1 OVERWRAP

## (undated) DEVICE — TROCAR: Brand: KII® OPTICAL ACCESS SYSTEM

## (undated) DEVICE — SUTURING DEVICE: Brand: ENDO STITCH

## (undated) DEVICE — COVER LT HNDL PLAS RIG 1 PER PK

## (undated) DEVICE — DEVICE RELD SZ 0 L8IN GRN ABSRB FOR ENDO SILS STIT DEVS

## (undated) DEVICE — INFECTION CONTROL KIT SYS

## (undated) DEVICE — SUTURE SZ 0 27IN 5/8 CIR UR-6  TAPER PT VIOLET ABSRB VICRYL J603H

## (undated) DEVICE — SYR 10ML LUER LOK 1/5ML GRAD --

## (undated) DEVICE — GARMENT,MEDLINE,DVT,INT,CALF,MED, GEN2: Brand: MEDLINE